# Patient Record
Sex: MALE | Race: BLACK OR AFRICAN AMERICAN | Employment: OTHER | URBAN - METROPOLITAN AREA
[De-identification: names, ages, dates, MRNs, and addresses within clinical notes are randomized per-mention and may not be internally consistent; named-entity substitution may affect disease eponyms.]

---

## 2020-01-01 ENCOUNTER — APPOINTMENT (OUTPATIENT)
Dept: GENERAL RADIOLOGY | Age: 65
DRG: 207 | End: 2020-01-01
Payer: MEDICARE

## 2020-01-01 ENCOUNTER — APPOINTMENT (OUTPATIENT)
Dept: CT IMAGING | Age: 65
DRG: 207 | End: 2020-01-01
Payer: MEDICARE

## 2020-01-01 ENCOUNTER — HOSPITAL ENCOUNTER (INPATIENT)
Age: 65
LOS: 19 days | DRG: 207 | End: 2020-11-30
Attending: EMERGENCY MEDICINE | Admitting: INTERNAL MEDICINE
Payer: MEDICARE

## 2020-01-01 VITALS
HEIGHT: 68 IN | SYSTOLIC BLOOD PRESSURE: 114 MMHG | BODY MASS INDEX: 36.32 KG/M2 | OXYGEN SATURATION: 91 % | WEIGHT: 239.64 LBS | TEMPERATURE: 99.2 F | HEART RATE: 114 BPM | DIASTOLIC BLOOD PRESSURE: 60 MMHG | RESPIRATION RATE: 33 BRPM

## 2020-01-01 LAB
AADO2: 218.4 MMHG
AADO2: 236.1 MMHG
AADO2: 256.7 MMHG
AADO2: 281 MMHG
AADO2: 285.6 MMHG
AADO2: 291.2 MMHG
AADO2: 298.8 MMHG
AADO2: 318.8 MMHG
AADO2: 337.6 MMHG
AADO2: 342.5 MMHG
AADO2: 346.3 MMHG
AADO2: 392.8 MMHG
AADO2: 411.1 MMHG
AADO2: 454.1 MMHG
AADO2: 464.3 MMHG
AADO2: 476.4 MMHG
AADO2: 477.3 MMHG
AADO2: 477.5 MMHG
AADO2: 494.5 MMHG
AADO2: 516.2 MMHG
AADO2: 538.2 MMHG
AADO2: 538.7 MMHG
AADO2: 568.4 MMHG
AADO2: 575.6 MMHG
ALBUMIN SERPL-MCNC: 2.3 G/DL (ref 3.5–5.2)
ALBUMIN SERPL-MCNC: 2.4 G/DL (ref 3.5–5.2)
ALBUMIN SERPL-MCNC: 2.4 G/DL (ref 3.5–5.2)
ALBUMIN SERPL-MCNC: 2.5 G/DL (ref 3.5–5.2)
ALBUMIN SERPL-MCNC: 2.6 G/DL (ref 3.5–5.2)
ALBUMIN SERPL-MCNC: 2.7 G/DL (ref 3.5–5.2)
ALBUMIN SERPL-MCNC: 2.7 G/DL (ref 3.5–5.2)
ALBUMIN SERPL-MCNC: 2.8 G/DL (ref 3.5–5.2)
ALBUMIN SERPL-MCNC: 2.9 G/DL (ref 3.5–5.2)
ALBUMIN SERPL-MCNC: 3 G/DL (ref 3.5–5.2)
ALBUMIN SERPL-MCNC: 3 G/DL (ref 3.5–5.2)
ALBUMIN SERPL-MCNC: 3.1 G/DL (ref 3.5–5.2)
ALBUMIN SERPL-MCNC: 3.1 G/DL (ref 3.5–5.2)
ALBUMIN SERPL-MCNC: 3.2 G/DL (ref 3.5–5.2)
ALBUMIN SERPL-MCNC: 3.4 G/DL (ref 3.5–5.2)
ALP BLD-CCNC: 100 U/L (ref 40–129)
ALP BLD-CCNC: 108 U/L (ref 40–129)
ALP BLD-CCNC: 120 U/L (ref 40–129)
ALP BLD-CCNC: 122 U/L (ref 40–129)
ALP BLD-CCNC: 123 U/L (ref 40–129)
ALP BLD-CCNC: 128 U/L (ref 40–129)
ALP BLD-CCNC: 132 U/L (ref 40–129)
ALP BLD-CCNC: 142 U/L (ref 40–129)
ALP BLD-CCNC: 64 U/L (ref 40–129)
ALP BLD-CCNC: 71 U/L (ref 40–129)
ALP BLD-CCNC: 72 U/L (ref 40–129)
ALP BLD-CCNC: 76 U/L (ref 40–129)
ALP BLD-CCNC: 78 U/L (ref 40–129)
ALP BLD-CCNC: 81 U/L (ref 40–129)
ALP BLD-CCNC: 82 U/L (ref 40–129)
ALP BLD-CCNC: 85 U/L (ref 40–129)
ALP BLD-CCNC: 91 U/L (ref 40–129)
ALP BLD-CCNC: 94 U/L (ref 40–129)
ALP BLD-CCNC: 95 U/L (ref 40–129)
ALP BLD-CCNC: 96 U/L (ref 40–129)
ALT SERPL-CCNC: 114 U/L (ref 0–40)
ALT SERPL-CCNC: 131 U/L (ref 0–40)
ALT SERPL-CCNC: 36 U/L (ref 0–40)
ALT SERPL-CCNC: 37 U/L (ref 0–40)
ALT SERPL-CCNC: 39 U/L (ref 0–40)
ALT SERPL-CCNC: 40 U/L (ref 0–40)
ALT SERPL-CCNC: 42 U/L (ref 0–40)
ALT SERPL-CCNC: 43 U/L (ref 0–40)
ALT SERPL-CCNC: 44 U/L (ref 0–40)
ALT SERPL-CCNC: 48 U/L (ref 0–40)
ALT SERPL-CCNC: 48 U/L (ref 0–40)
ALT SERPL-CCNC: 49 U/L (ref 0–40)
ALT SERPL-CCNC: 53 U/L (ref 0–40)
ALT SERPL-CCNC: 72 U/L (ref 0–40)
ALT SERPL-CCNC: 79 U/L (ref 0–40)
ALT SERPL-CCNC: 93 U/L (ref 0–40)
ANION GAP SERPL CALCULATED.3IONS-SCNC: 10 MMOL/L (ref 7–16)
ANION GAP SERPL CALCULATED.3IONS-SCNC: 12 MMOL/L (ref 7–16)
ANION GAP SERPL CALCULATED.3IONS-SCNC: 12 MMOL/L (ref 7–16)
ANION GAP SERPL CALCULATED.3IONS-SCNC: 13 MMOL/L (ref 7–16)
ANION GAP SERPL CALCULATED.3IONS-SCNC: 15 MMOL/L (ref 7–16)
ANION GAP SERPL CALCULATED.3IONS-SCNC: 15 MMOL/L (ref 7–16)
ANION GAP SERPL CALCULATED.3IONS-SCNC: 2 MMOL/L (ref 7–16)
ANION GAP SERPL CALCULATED.3IONS-SCNC: 4 MMOL/L (ref 7–16)
ANION GAP SERPL CALCULATED.3IONS-SCNC: 4 MMOL/L (ref 7–16)
ANION GAP SERPL CALCULATED.3IONS-SCNC: 5 MMOL/L (ref 7–16)
ANION GAP SERPL CALCULATED.3IONS-SCNC: 6 MMOL/L (ref 7–16)
ANION GAP SERPL CALCULATED.3IONS-SCNC: 7 MMOL/L (ref 7–16)
ANION GAP SERPL CALCULATED.3IONS-SCNC: 8 MMOL/L (ref 7–16)
ANION GAP SERPL CALCULATED.3IONS-SCNC: 9 MMOL/L (ref 7–16)
ANION GAP SERPL CALCULATED.3IONS-SCNC: 9 MMOL/L (ref 7–16)
ANISOCYTOSIS: ABNORMAL
APTT: 103.3 SEC (ref 24.5–35.1)
APTT: 173.1 SEC (ref 24.5–35.1)
APTT: 34.3 SEC (ref 24.5–35.1)
APTT: 41.8 SEC (ref 24.5–35.1)
APTT: 43.6 SEC (ref 24.5–35.1)
APTT: 47 SEC (ref 24.5–35.1)
APTT: 50.7 SEC (ref 24.5–35.1)
APTT: 51.8 SEC (ref 24.5–35.1)
APTT: 65.2 SEC (ref 24.5–35.1)
AST SERPL-CCNC: 139 U/L (ref 0–39)
AST SERPL-CCNC: 201 U/L (ref 0–39)
AST SERPL-CCNC: 21 U/L (ref 0–39)
AST SERPL-CCNC: 25 U/L (ref 0–39)
AST SERPL-CCNC: 28 U/L (ref 0–39)
AST SERPL-CCNC: 29 U/L (ref 0–39)
AST SERPL-CCNC: 31 U/L (ref 0–39)
AST SERPL-CCNC: 32 U/L (ref 0–39)
AST SERPL-CCNC: 33 U/L (ref 0–39)
AST SERPL-CCNC: 39 U/L (ref 0–39)
AST SERPL-CCNC: 39 U/L (ref 0–39)
AST SERPL-CCNC: 42 U/L (ref 0–39)
AST SERPL-CCNC: 43 U/L (ref 0–39)
AST SERPL-CCNC: 43 U/L (ref 0–39)
AST SERPL-CCNC: 46 U/L (ref 0–39)
AST SERPL-CCNC: 48 U/L (ref 0–39)
AST SERPL-CCNC: 58 U/L (ref 0–39)
AST SERPL-CCNC: 85 U/L (ref 0–39)
ATYPICAL LYMPHOCYTE RELATIVE PERCENT: 0.9 % (ref 0–4)
ATYPICAL LYMPHOCYTE RELATIVE PERCENT: 0.9 % (ref 0–4)
ATYPICAL LYMPHOCYTE RELATIVE PERCENT: 1 % (ref 0–4)
ATYPICAL LYMPHOCYTE RELATIVE PERCENT: 1 % (ref 0–4)
ATYPICAL LYMPHOCYTE RELATIVE PERCENT: 2 % (ref 0–4)
ATYPICAL LYMPHOCYTE RELATIVE PERCENT: 2 % (ref 0–4)
ATYPICAL LYMPHOCYTE RELATIVE PERCENT: 7 % (ref 0–4)
B.E.: -0.2 MMOL/L (ref -3–3)
B.E.: -1.3 MMOL/L (ref -3–3)
B.E.: -1.3 MMOL/L (ref -3–3)
B.E.: -1.8 MMOL/L
B.E.: -1.9 MMOL/L (ref -3–3)
B.E.: 0.4 MMOL/L (ref -3–3)
B.E.: 0.5 MMOL/L (ref -3–3)
B.E.: 1.5 MMOL/L (ref -3–3)
B.E.: 1.6 MMOL/L (ref -3–3)
B.E.: 1.9 MMOL/L (ref -3–3)
B.E.: 10.1 MMOL/L (ref -3–3)
B.E.: 10.5 MMOL/L (ref -3–3)
B.E.: 13.9 MMOL/L (ref -3–3)
B.E.: 2.3 MMOL/L (ref -3–3)
B.E.: 20.9 MMOL/L (ref -3–3)
B.E.: 3 MMOL/L (ref -3–0)
B.E.: 3.1 MMOL/L (ref -3–3)
B.E.: 4 MMOL/L (ref -3–3)
B.E.: 4.2 MMOL/L (ref -3–3)
B.E.: 4.2 MMOL/L (ref -3–3)
B.E.: 5 MMOL/L (ref -3–3)
B.E.: 5.8 MMOL/L (ref -3–3)
B.E.: 7.3 MMOL/L (ref -3–3)
B.E.: 7.8 MMOL/L (ref -3–3)
BASOPHILIC STIPPLING: ABNORMAL
BASOPHILS ABSOLUTE: 0 E9/L (ref 0–0.2)
BASOPHILS ABSOLUTE: 0.04 E9/L (ref 0–0.2)
BASOPHILS ABSOLUTE: 0.04 E9/L (ref 0–0.2)
BASOPHILS ABSOLUTE: 0.05 E9/L (ref 0–0.2)
BASOPHILS ABSOLUTE: 0.06 E9/L (ref 0–0.2)
BASOPHILS ABSOLUTE: 0.07 E9/L (ref 0–0.2)
BASOPHILS ABSOLUTE: 0.21 E9/L (ref 0–0.2)
BASOPHILS RELATIVE PERCENT: 0 % (ref 0–2)
BASOPHILS RELATIVE PERCENT: 0.3 % (ref 0–2)
BASOPHILS RELATIVE PERCENT: 0.5 % (ref 0–2)
BASOPHILS RELATIVE PERCENT: 0.7 % (ref 0–2)
BASOPHILS RELATIVE PERCENT: 0.9 % (ref 0–2)
BILIRUB SERPL-MCNC: 0.3 MG/DL (ref 0–1.2)
BILIRUB SERPL-MCNC: 0.4 MG/DL (ref 0–1.2)
BILIRUB SERPL-MCNC: 0.5 MG/DL (ref 0–1.2)
BILIRUB SERPL-MCNC: 0.6 MG/DL (ref 0–1.2)
BILIRUB SERPL-MCNC: 0.7 MG/DL (ref 0–1.2)
BILIRUB SERPL-MCNC: 1.1 MG/DL (ref 0–1.2)
BILIRUB SERPL-MCNC: 1.1 MG/DL (ref 0–1.2)
BILIRUB SERPL-MCNC: 1.7 MG/DL (ref 0–1.2)
BILIRUBIN URINE: NEGATIVE
BLOOD CULTURE, ROUTINE: NORMAL
BLOOD, URINE: NEGATIVE
BUN BLDV-MCNC: 16 MG/DL (ref 8–23)
BUN BLDV-MCNC: 17 MG/DL (ref 8–23)
BUN BLDV-MCNC: 17 MG/DL (ref 8–23)
BUN BLDV-MCNC: 18 MG/DL (ref 8–23)
BUN BLDV-MCNC: 19 MG/DL (ref 8–23)
BUN BLDV-MCNC: 19 MG/DL (ref 8–23)
BUN BLDV-MCNC: 20 MG/DL (ref 8–23)
BUN BLDV-MCNC: 20 MG/DL (ref 8–23)
BUN BLDV-MCNC: 21 MG/DL (ref 8–23)
BUN BLDV-MCNC: 23 MG/DL (ref 8–23)
BUN BLDV-MCNC: 28 MG/DL (ref 8–23)
BUN BLDV-MCNC: 31 MG/DL (ref 8–23)
BUN BLDV-MCNC: 32 MG/DL (ref 8–23)
BUN BLDV-MCNC: 32 MG/DL (ref 8–23)
BUN BLDV-MCNC: 33 MG/DL (ref 8–23)
BUN BLDV-MCNC: 35 MG/DL (ref 8–23)
BUN BLDV-MCNC: 35 MG/DL (ref 8–23)
BUN BLDV-MCNC: 37 MG/DL (ref 8–23)
BUN BLDV-MCNC: 38 MG/DL (ref 8–23)
C-REACTIVE PROTEIN: 0.4 MG/DL (ref 0–0.4)
C-REACTIVE PROTEIN: 0.5 MG/DL (ref 0–0.4)
C-REACTIVE PROTEIN: 0.8 MG/DL (ref 0–0.4)
C-REACTIVE PROTEIN: 1.4 MG/DL (ref 0–0.4)
C-REACTIVE PROTEIN: 12.4 MG/DL (ref 0–0.4)
C-REACTIVE PROTEIN: 2.1 MG/DL (ref 0–0.4)
C-REACTIVE PROTEIN: 23 MG/DL (ref 0–0.4)
C-REACTIVE PROTEIN: 27.8 MG/DL (ref 0–0.4)
C-REACTIVE PROTEIN: 5.5 MG/DL (ref 0–0.4)
C-REACTIVE PROTEIN: 8.6 MG/DL (ref 0–0.4)
CALCIUM SERPL-MCNC: 10.4 MG/DL (ref 8.6–10.2)
CALCIUM SERPL-MCNC: 7.7 MG/DL (ref 8.6–10.2)
CALCIUM SERPL-MCNC: 8 MG/DL (ref 8.6–10.2)
CALCIUM SERPL-MCNC: 8.1 MG/DL (ref 8.6–10.2)
CALCIUM SERPL-MCNC: 8.2 MG/DL (ref 8.6–10.2)
CALCIUM SERPL-MCNC: 8.3 MG/DL (ref 8.6–10.2)
CALCIUM SERPL-MCNC: 8.3 MG/DL (ref 8.6–10.2)
CALCIUM SERPL-MCNC: 8.4 MG/DL (ref 8.6–10.2)
CALCIUM SERPL-MCNC: 8.6 MG/DL (ref 8.6–10.2)
CALCIUM SERPL-MCNC: 8.7 MG/DL (ref 8.6–10.2)
CALCIUM SERPL-MCNC: 8.8 MG/DL (ref 8.6–10.2)
CALCIUM SERPL-MCNC: 8.9 MG/DL (ref 8.6–10.2)
CALCIUM SERPL-MCNC: 9 MG/DL (ref 8.6–10.2)
CALCIUM SERPL-MCNC: 9.9 MG/DL (ref 8.6–10.2)
CHLORIDE BLD-SCNC: 100 MMOL/L (ref 98–107)
CHLORIDE BLD-SCNC: 101 MMOL/L (ref 98–107)
CHLORIDE BLD-SCNC: 101 MMOL/L (ref 98–107)
CHLORIDE BLD-SCNC: 102 MMOL/L (ref 98–107)
CHLORIDE BLD-SCNC: 103 MMOL/L (ref 98–107)
CHLORIDE BLD-SCNC: 105 MMOL/L (ref 98–107)
CHLORIDE BLD-SCNC: 109 MMOL/L (ref 98–107)
CHLORIDE BLD-SCNC: 110 MMOL/L (ref 98–107)
CHLORIDE BLD-SCNC: 110 MMOL/L (ref 98–107)
CHLORIDE BLD-SCNC: 112 MMOL/L (ref 98–107)
CHLORIDE BLD-SCNC: 112 MMOL/L (ref 98–107)
CHLORIDE BLD-SCNC: 122 MMOL/L (ref 98–107)
CHLORIDE BLD-SCNC: 99 MMOL/L (ref 98–107)
CHLORIDE URINE RANDOM: 41 MMOL/L
CHLORIDE URINE RANDOM: <20 MMOL/L
CHLORIDE URINE RANDOM: <20 MMOL/L
CLARITY: CLEAR
CO2: 22 MMOL/L (ref 22–29)
CO2: 24 MMOL/L (ref 22–29)
CO2: 24 MMOL/L (ref 22–29)
CO2: 25 MMOL/L (ref 22–29)
CO2: 27 MMOL/L (ref 22–29)
CO2: 28 MMOL/L (ref 22–29)
CO2: 28 MMOL/L (ref 22–29)
CO2: 29 MMOL/L (ref 22–29)
CO2: 29 MMOL/L (ref 22–29)
CO2: 30 MMOL/L (ref 22–29)
CO2: 31 MMOL/L (ref 22–29)
CO2: 31 MMOL/L (ref 22–29)
CO2: 32 MMOL/L (ref 22–29)
CO2: 32 MMOL/L (ref 22–29)
CO2: 33 MMOL/L (ref 22–29)
CO2: 35 MMOL/L (ref 22–29)
CO2: 36 MMOL/L (ref 22–29)
CO2: 39 MMOL/L (ref 22–29)
CO2: 42 MMOL/L (ref 22–29)
CO2: 43 MMOL/L (ref 22–29)
CO2: 45 MMOL/L (ref 22–29)
COHB: 0.3 % (ref 0–1.5)
COHB: 0.3 % (ref 0–1.5)
COHB: 0.4 % (ref 0–1.5)
COHB: 0.4 % (ref 0–1.5)
COHB: 0.5 % (ref 0–1.5)
COHB: 0.5 % (ref 0–1.5)
COHB: 0.6 % (ref 0–1.5)
COHB: 0.7 % (ref 0–1.5)
COHB: 0.8 % (ref 0–1.5)
COHB: 0.9 % (ref 0–1.5)
COHB: 1.2 % (ref 0–1.5)
COLOR: YELLOW
CORTISOL TOTAL: 2.37 MCG/DL (ref 2.68–18.4)
CREAT SERPL-MCNC: 0.7 MG/DL (ref 0.7–1.2)
CREAT SERPL-MCNC: 0.8 MG/DL (ref 0.7–1.2)
CREAT SERPL-MCNC: 0.9 MG/DL (ref 0.7–1.2)
CREAT SERPL-MCNC: 1 MG/DL (ref 0.7–1.2)
CREAT SERPL-MCNC: 1.1 MG/DL (ref 0.7–1.2)
CREAT SERPL-MCNC: 1.2 MG/DL (ref 0.7–1.2)
CREAT SERPL-MCNC: 1.2 MG/DL (ref 0.7–1.2)
CREAT SERPL-MCNC: 1.6 MG/DL (ref 0.7–1.2)
CREAT SERPL-MCNC: 1.9 MG/DL (ref 0.7–1.2)
CREAT SERPL-MCNC: 2.4 MG/DL (ref 0.7–1.2)
CREATININE URINE: 121 MG/DL (ref 40–278)
CRITICAL NOTIFICATION: YES
CRITICAL: ABNORMAL
CULTURE CATHETER TIP: ABNORMAL
CULTURE, BLOOD 2: ABNORMAL
CULTURE, BLOOD 2: NORMAL
CULTURE, RESPIRATORY: ABNORMAL
CULTURE, RESPIRATORY: ABNORMAL
CULTURE, RESPIRATORY: NORMAL
CULTURE, RESPIRATORY: NORMAL
D DIMER: 1476 NG/ML DDU
D DIMER: 2425 NG/ML DDU
D DIMER: 252 NG/ML DDU
D DIMER: 256 NG/ML DDU
D DIMER: 3674 NG/ML DDU
D DIMER: 581 NG/ML DDU
D DIMER: 601 NG/ML DDU
D DIMER: 834 NG/ML DDU
D DIMER: 972 NG/ML DDU
D DIMER: >5250 NG/ML DDU
DATE ANALYZED: ABNORMAL
DATE OF COLLECTION: ABNORMAL
DELIVERY SYSTEMS: ABNORMAL
DEVICE: ABNORMAL
EKG ATRIAL RATE: 119 BPM
EKG ATRIAL RATE: 87 BPM
EKG P AXIS: 66 DEGREES
EKG P AXIS: 67 DEGREES
EKG P-R INTERVAL: 114 MS
EKG P-R INTERVAL: 138 MS
EKG Q-T INTERVAL: 370 MS
EKG Q-T INTERVAL: 408 MS
EKG QRS DURATION: 120 MS
EKG QRS DURATION: 124 MS
EKG QTC CALCULATION (BAZETT): 490 MS
EKG QTC CALCULATION (BAZETT): 520 MS
EKG R AXIS: -48 DEGREES
EKG R AXIS: -67 DEGREES
EKG T AXIS: 27 DEGREES
EKG T AXIS: 6 DEGREES
EKG VENTRICULAR RATE: 119 BPM
EKG VENTRICULAR RATE: 87 BPM
EOSINOPHILS ABSOLUTE: 0 E9/L (ref 0.05–0.5)
EOSINOPHILS ABSOLUTE: 0.01 E9/L (ref 0.05–0.5)
EOSINOPHILS ABSOLUTE: 0.02 E9/L (ref 0.05–0.5)
EOSINOPHILS ABSOLUTE: 0.08 E9/L (ref 0.05–0.5)
EOSINOPHILS ABSOLUTE: 0.09 E9/L (ref 0.05–0.5)
EOSINOPHILS ABSOLUTE: 0.13 E9/L (ref 0.05–0.5)
EOSINOPHILS ABSOLUTE: 0.17 E9/L (ref 0.05–0.5)
EOSINOPHILS RELATIVE PERCENT: 0 % (ref 0–6)
EOSINOPHILS RELATIVE PERCENT: 0.1 % (ref 0–6)
EOSINOPHILS RELATIVE PERCENT: 0.2 % (ref 0–6)
EOSINOPHILS RELATIVE PERCENT: 0.8 % (ref 0–6)
EOSINOPHILS RELATIVE PERCENT: 0.9 % (ref 0–6)
EOSINOPHILS RELATIVE PERCENT: 1 % (ref 0–6)
EOSINOPHILS RELATIVE PERCENT: 1.2 % (ref 0–6)
FERRITIN: 1710 NG/ML
FIBRINOGEN: 442 MG/DL (ref 225–540)
FIBRINOGEN: 475 MG/DL (ref 225–540)
FIBRINOGEN: 528 MG/DL (ref 225–540)
FIBRINOGEN: 560 MG/DL (ref 225–540)
FIBRINOGEN: 625 MG/DL (ref 225–540)
FIBRINOGEN: >700 MG/DL (ref 225–540)
FIO2 ARTERIAL: 80
FIO2: 100 %
FIO2: 50 %
FIO2: 50 %
FIO2: 55 %
FIO2: 60 %
FIO2: 70 %
FIO2: 70 %
FIO2: 75 %
FIO2: 80 %
FIO2: 80 %
FIO2: 90 %
GFR AFRICAN AMERICAN: 33
GFR AFRICAN AMERICAN: 43
GFR AFRICAN AMERICAN: 52
GFR AFRICAN AMERICAN: >60
GFR NON-AFRICAN AMERICAN: 33 ML/MIN/1.73
GFR NON-AFRICAN AMERICAN: 43 ML/MIN/1.73
GFR NON-AFRICAN AMERICAN: 52 ML/MIN/1.73
GFR NON-AFRICAN AMERICAN: >60 ML/MIN/1.73
GLUCOSE BLD-MCNC: 123 MG/DL (ref 74–99)
GLUCOSE BLD-MCNC: 127 MG/DL (ref 74–99)
GLUCOSE BLD-MCNC: 142 MG/DL (ref 74–99)
GLUCOSE BLD-MCNC: 156 MG/DL (ref 74–99)
GLUCOSE BLD-MCNC: 157 MG/DL (ref 74–99)
GLUCOSE BLD-MCNC: 160 MG/DL (ref 74–99)
GLUCOSE BLD-MCNC: 172 MG/DL (ref 74–99)
GLUCOSE BLD-MCNC: 176 MG/DL (ref 74–99)
GLUCOSE BLD-MCNC: 177 MG/DL (ref 74–99)
GLUCOSE BLD-MCNC: 178 MG/DL (ref 74–99)
GLUCOSE BLD-MCNC: 180 MG/DL (ref 74–99)
GLUCOSE BLD-MCNC: 181 MG/DL (ref 74–99)
GLUCOSE BLD-MCNC: 184 MG/DL (ref 74–99)
GLUCOSE BLD-MCNC: 187 MG/DL (ref 74–99)
GLUCOSE BLD-MCNC: 195 MG/DL (ref 74–99)
GLUCOSE BLD-MCNC: 196 MG/DL (ref 74–99)
GLUCOSE BLD-MCNC: 199 MG/DL (ref 74–99)
GLUCOSE BLD-MCNC: 201 MG/DL (ref 74–99)
GLUCOSE BLD-MCNC: 213 MG/DL (ref 74–99)
GLUCOSE BLD-MCNC: 217 MG/DL (ref 74–99)
GLUCOSE BLD-MCNC: 226 MG/DL (ref 74–99)
GLUCOSE URINE: NEGATIVE MG/DL
GRAM STAIN ORDERABLE: NORMAL
HCO3 ARTERIAL: 32.3 MMOL/L (ref 22–26)
HCO3: 22.4 MMOL/L
HCO3: 23.6 MMOL/L (ref 22–26)
HCO3: 24.5 MMOL/L (ref 22–26)
HCO3: 25.3 MMOL/L (ref 22–26)
HCO3: 25.5 MMOL/L (ref 22–26)
HCO3: 25.5 MMOL/L (ref 22–26)
HCO3: 26.7 MMOL/L (ref 22–26)
HCO3: 26.9 MMOL/L (ref 22–26)
HCO3: 27.5 MMOL/L (ref 22–26)
HCO3: 27.8 MMOL/L (ref 22–26)
HCO3: 28 MMOL/L (ref 22–26)
HCO3: 28.1 MMOL/L (ref 22–26)
HCO3: 28.7 MMOL/L (ref 22–26)
HCO3: 29 MMOL/L (ref 22–26)
HCO3: 29.1 MMOL/L (ref 22–26)
HCO3: 29.2 MMOL/L (ref 22–26)
HCO3: 29.9 MMOL/L (ref 22–26)
HCO3: 32 MMOL/L (ref 22–26)
HCO3: 32.3 MMOL/L (ref 22–26)
HCO3: 35.8 MMOL/L (ref 22–26)
HCO3: 36.2 MMOL/L (ref 22–26)
HCO3: 37.2 MMOL/L (ref 22–26)
HCO3: 39.5 MMOL/L (ref 22–26)
HCO3: 41.4 MMOL/L (ref 22–26)
HCO3: 50.9 MMOL/L (ref 22–26)
HCT VFR BLD CALC: 29.7 % (ref 37–54)
HCT VFR BLD CALC: 32.3 % (ref 37–54)
HCT VFR BLD CALC: 33.4 % (ref 37–54)
HCT VFR BLD CALC: 33.8 % (ref 37–54)
HCT VFR BLD CALC: 34 % (ref 37–54)
HCT VFR BLD CALC: 37.1 % (ref 37–54)
HCT VFR BLD CALC: 37.8 % (ref 37–54)
HCT VFR BLD CALC: 39.4 % (ref 37–54)
HCT VFR BLD CALC: 39.7 % (ref 37–54)
HCT VFR BLD CALC: 40.1 % (ref 37–54)
HCT VFR BLD CALC: 41 % (ref 37–54)
HCT VFR BLD CALC: 41.2 % (ref 37–54)
HCT VFR BLD CALC: 41.7 % (ref 37–54)
HCT VFR BLD CALC: 42.2 % (ref 37–54)
HCT VFR BLD CALC: 42.5 % (ref 37–54)
HCT VFR BLD CALC: 43.1 % (ref 37–54)
HCT VFR BLD CALC: 43.3 % (ref 37–54)
HCT VFR BLD CALC: 44.1 % (ref 37–54)
HCT VFR BLD CALC: 44.5 % (ref 37–54)
HCT VFR BLD CALC: 45.2 % (ref 37–54)
HCT VFR BLD CALC: 47.3 % (ref 37–54)
HEMOGLOBIN: 10 G/DL (ref 12.5–16.5)
HEMOGLOBIN: 10.4 G/DL (ref 12.5–16.5)
HEMOGLOBIN: 10.4 G/DL (ref 12.5–16.5)
HEMOGLOBIN: 11.7 G/DL (ref 12.5–16.5)
HEMOGLOBIN: 12 G/DL (ref 12.5–16.5)
HEMOGLOBIN: 12.1 G/DL (ref 12.5–16.5)
HEMOGLOBIN: 12.4 G/DL (ref 12.5–16.5)
HEMOGLOBIN: 13 G/DL (ref 12.5–16.5)
HEMOGLOBIN: 13 G/DL (ref 12.5–16.5)
HEMOGLOBIN: 13.4 G/DL (ref 12.5–16.5)
HEMOGLOBIN: 13.4 G/DL (ref 12.5–16.5)
HEMOGLOBIN: 13.5 G/DL (ref 12.5–16.5)
HEMOGLOBIN: 13.6 G/DL (ref 12.5–16.5)
HEMOGLOBIN: 13.7 G/DL (ref 12.5–16.5)
HEMOGLOBIN: 14.1 G/DL (ref 12.5–16.5)
HEMOGLOBIN: 14.2 G/DL (ref 12.5–16.5)
HEMOGLOBIN: 14.6 G/DL (ref 12.5–16.5)
HEMOGLOBIN: 14.9 G/DL (ref 12.5–16.5)
HEMOGLOBIN: 15.3 G/DL (ref 12.5–16.5)
HEMOGLOBIN: 9.2 G/DL (ref 12.5–16.5)
HEMOGLOBIN: 9.9 G/DL (ref 12.5–16.5)
HHB: 10.6 % (ref 0–5)
HHB: 13 % (ref 0–5)
HHB: 13.4 % (ref 0–5)
HHB: 2.2 % (ref 0–5)
HHB: 20.7 %
HHB: 3.7 % (ref 0–5)
HHB: 3.9 % (ref 0–5)
HHB: 4.1 % (ref 0–5)
HHB: 4.5 % (ref 0–5)
HHB: 4.6 % (ref 0–5)
HHB: 4.7 % (ref 0–5)
HHB: 4.8 % (ref 0–5)
HHB: 5.5 % (ref 0–5)
HHB: 5.7 % (ref 0–5)
HHB: 6 % (ref 0–5)
HHB: 6.5 % (ref 0–5)
HHB: 6.7 % (ref 0–5)
HHB: 6.8 % (ref 0–5)
HHB: 7.3 % (ref 0–5)
HHB: 7.9 % (ref 0–5)
HHB: 8.3 % (ref 0–5)
HHB: 8.7 % (ref 0–5)
HHB: 9.5 % (ref 0–5)
HHB: 9.7 % (ref 0–5)
HHB: 9.7 % (ref 0–5)
IMMATURE GRANULOCYTES #: 0.3 E9/L
IMMATURE GRANULOCYTES #: 0.34 E9/L
IMMATURE GRANULOCYTES #: 0.35 E9/L
IMMATURE GRANULOCYTES #: 0.37 E9/L
IMMATURE GRANULOCYTES #: 0.45 E9/L
IMMATURE GRANULOCYTES #: 0.59 E9/L
IMMATURE GRANULOCYTES #: 0.69 E9/L
IMMATURE GRANULOCYTES %: 3.3 % (ref 0–5)
IMMATURE GRANULOCYTES %: 3.6 % (ref 0–5)
IMMATURE GRANULOCYTES %: 3.6 % (ref 0–5)
IMMATURE GRANULOCYTES %: 4.1 % (ref 0–5)
IMMATURE GRANULOCYTES %: 4.2 % (ref 0–5)
IMMATURE GRANULOCYTES %: 4.4 % (ref 0–5)
IMMATURE GRANULOCYTES %: 6.3 % (ref 0–5)
KETONES, URINE: NEGATIVE MG/DL
L. PNEUMOPHILA SEROGP 1 UR AG: NORMAL
LAB: ABNORMAL
LACTATE DEHYDROGENASE: 1112 U/L (ref 135–225)
LACTIC ACID, SEPSIS: 2.8 MMOL/L (ref 0.5–1.9)
LACTIC ACID: 1.2 MMOL/L (ref 0.5–2.2)
LACTIC ACID: 1.3 MMOL/L (ref 0.5–2.2)
LACTIC ACID: 1.4 MMOL/L (ref 0.5–2.2)
LACTIC ACID: 1.8 MMOL/L (ref 0.5–2.2)
LACTIC ACID: 1.9 MMOL/L (ref 0.5–2.2)
LACTIC ACID: 2.1 MMOL/L (ref 0.5–2.2)
LACTIC ACID: 2.1 MMOL/L (ref 0.5–2.2)
LACTIC ACID: 2.3 MMOL/L (ref 0.5–2.2)
LACTIC ACID: 2.9 MMOL/L (ref 0.5–2.2)
LEUKOCYTE ESTERASE, URINE: NEGATIVE
LIPASE: 65 U/L (ref 13–60)
LYMPHOCYTES ABSOLUTE: 0.38 E9/L (ref 1.5–4)
LYMPHOCYTES ABSOLUTE: 0.4 E9/L (ref 1.5–4)
LYMPHOCYTES ABSOLUTE: 0.44 E9/L (ref 1.5–4)
LYMPHOCYTES ABSOLUTE: 0.66 E9/L (ref 1.5–4)
LYMPHOCYTES ABSOLUTE: 0.73 E9/L (ref 1.5–4)
LYMPHOCYTES ABSOLUTE: 0.76 E9/L (ref 1.5–4)
LYMPHOCYTES ABSOLUTE: 0.79 E9/L (ref 1.5–4)
LYMPHOCYTES ABSOLUTE: 0.83 E9/L (ref 1.5–4)
LYMPHOCYTES ABSOLUTE: 0.86 E9/L (ref 1.5–4)
LYMPHOCYTES ABSOLUTE: 0.88 E9/L (ref 1.5–4)
LYMPHOCYTES ABSOLUTE: 1.01 E9/L (ref 1.5–4)
LYMPHOCYTES ABSOLUTE: 1.07 E9/L (ref 1.5–4)
LYMPHOCYTES ABSOLUTE: 1.24 E9/L (ref 1.5–4)
LYMPHOCYTES ABSOLUTE: 1.26 E9/L (ref 1.5–4)
LYMPHOCYTES ABSOLUTE: 1.36 E9/L (ref 1.5–4)
LYMPHOCYTES ABSOLUTE: 1.41 E9/L (ref 1.5–4)
LYMPHOCYTES ABSOLUTE: 1.62 E9/L (ref 1.5–4)
LYMPHOCYTES ABSOLUTE: 1.89 E9/L (ref 1.5–4)
LYMPHOCYTES ABSOLUTE: 2.35 E9/L (ref 1.5–4)
LYMPHOCYTES ABSOLUTE: 3.94 E9/L (ref 1.5–4)
LYMPHOCYTES RELATIVE PERCENT: 1.7 % (ref 20–42)
LYMPHOCYTES RELATIVE PERCENT: 10 % (ref 20–42)
LYMPHOCYTES RELATIVE PERCENT: 10.4 % (ref 20–42)
LYMPHOCYTES RELATIVE PERCENT: 11 % (ref 20–42)
LYMPHOCYTES RELATIVE PERCENT: 12.1 % (ref 20–42)
LYMPHOCYTES RELATIVE PERCENT: 12.3 % (ref 20–42)
LYMPHOCYTES RELATIVE PERCENT: 14 % (ref 20–42)
LYMPHOCYTES RELATIVE PERCENT: 17 % (ref 20–42)
LYMPHOCYTES RELATIVE PERCENT: 2 % (ref 20–42)
LYMPHOCYTES RELATIVE PERCENT: 3 % (ref 20–42)
LYMPHOCYTES RELATIVE PERCENT: 4 % (ref 20–42)
LYMPHOCYTES RELATIVE PERCENT: 4.4 % (ref 20–42)
LYMPHOCYTES RELATIVE PERCENT: 5.7 % (ref 20–42)
LYMPHOCYTES RELATIVE PERCENT: 6.1 % (ref 20–42)
LYMPHOCYTES RELATIVE PERCENT: 7 % (ref 20–42)
LYMPHOCYTES RELATIVE PERCENT: 7 % (ref 20–42)
LYMPHOCYTES RELATIVE PERCENT: 7.4 % (ref 20–42)
LYMPHOCYTES RELATIVE PERCENT: 7.7 % (ref 20–42)
LYMPHOCYTES RELATIVE PERCENT: 9.2 % (ref 20–42)
LYMPHOCYTES RELATIVE PERCENT: 9.8 % (ref 20–42)
Lab: ABNORMAL
Lab: NORMAL
MAGNESIUM: 2 MG/DL (ref 1.6–2.6)
MAGNESIUM: 2.1 MG/DL (ref 1.6–2.6)
MAGNESIUM: 2.1 MG/DL (ref 1.6–2.6)
MAGNESIUM: 2.2 MG/DL (ref 1.6–2.6)
MAGNESIUM: 2.2 MG/DL (ref 1.6–2.6)
MAGNESIUM: 2.3 MG/DL (ref 1.6–2.6)
MAGNESIUM: 2.4 MG/DL (ref 1.6–2.6)
MAGNESIUM: 2.5 MG/DL (ref 1.6–2.6)
MAGNESIUM: 2.6 MG/DL (ref 1.6–2.6)
MAGNESIUM: 2.7 MG/DL (ref 1.6–2.6)
MAGNESIUM: 2.7 MG/DL (ref 1.6–2.6)
MCH RBC QN AUTO: 27.5 PG (ref 26–35)
MCH RBC QN AUTO: 27.8 PG (ref 26–35)
MCH RBC QN AUTO: 27.9 PG (ref 26–35)
MCH RBC QN AUTO: 28.1 PG (ref 26–35)
MCH RBC QN AUTO: 28.1 PG (ref 26–35)
MCH RBC QN AUTO: 28.2 PG (ref 26–35)
MCH RBC QN AUTO: 28.3 PG (ref 26–35)
MCH RBC QN AUTO: 28.4 PG (ref 26–35)
MCH RBC QN AUTO: 28.5 PG (ref 26–35)
MCH RBC QN AUTO: 28.5 PG (ref 26–35)
MCH RBC QN AUTO: 28.6 PG (ref 26–35)
MCH RBC QN AUTO: 28.6 PG (ref 26–35)
MCH RBC QN AUTO: 28.8 PG (ref 26–35)
MCH RBC QN AUTO: 29 PG (ref 26–35)
MCH RBC QN AUTO: 29.2 PG (ref 26–35)
MCHC RBC AUTO-ENTMCNC: 29.6 % (ref 32–34.5)
MCHC RBC AUTO-ENTMCNC: 30.5 % (ref 32–34.5)
MCHC RBC AUTO-ENTMCNC: 30.6 % (ref 32–34.5)
MCHC RBC AUTO-ENTMCNC: 30.9 % (ref 32–34.5)
MCHC RBC AUTO-ENTMCNC: 31 % (ref 32–34.5)
MCHC RBC AUTO-ENTMCNC: 31.1 % (ref 32–34.5)
MCHC RBC AUTO-ENTMCNC: 31.2 % (ref 32–34.5)
MCHC RBC AUTO-ENTMCNC: 31.5 % (ref 32–34.5)
MCHC RBC AUTO-ENTMCNC: 31.5 % (ref 32–34.5)
MCHC RBC AUTO-ENTMCNC: 31.6 % (ref 32–34.5)
MCHC RBC AUTO-ENTMCNC: 31.7 % (ref 32–34.5)
MCHC RBC AUTO-ENTMCNC: 31.8 % (ref 32–34.5)
MCHC RBC AUTO-ENTMCNC: 32.1 % (ref 32–34.5)
MCHC RBC AUTO-ENTMCNC: 32.7 % (ref 32–34.5)
MCHC RBC AUTO-ENTMCNC: 33 % (ref 32–34.5)
MCHC RBC AUTO-ENTMCNC: 33.1 % (ref 32–34.5)
MCHC RBC AUTO-ENTMCNC: 33.6 % (ref 32–34.5)
MCHC RBC AUTO-ENTMCNC: 34.4 % (ref 32–34.5)
MCV RBC AUTO: 83.6 FL (ref 80–99.9)
MCV RBC AUTO: 84.4 FL (ref 80–99.9)
MCV RBC AUTO: 86.3 FL (ref 80–99.9)
MCV RBC AUTO: 86.7 FL (ref 80–99.9)
MCV RBC AUTO: 86.8 FL (ref 80–99.9)
MCV RBC AUTO: 86.9 FL (ref 80–99.9)
MCV RBC AUTO: 87.4 FL (ref 80–99.9)
MCV RBC AUTO: 87.6 FL (ref 80–99.9)
MCV RBC AUTO: 89.1 FL (ref 80–99.9)
MCV RBC AUTO: 89.2 FL (ref 80–99.9)
MCV RBC AUTO: 89.8 FL (ref 80–99.9)
MCV RBC AUTO: 90 FL (ref 80–99.9)
MCV RBC AUTO: 90 FL (ref 80–99.9)
MCV RBC AUTO: 90.2 FL (ref 80–99.9)
MCV RBC AUTO: 91.8 FL (ref 80–99.9)
MCV RBC AUTO: 92.5 FL (ref 80–99.9)
MCV RBC AUTO: 92.8 FL (ref 80–99.9)
MCV RBC AUTO: 94.3 FL (ref 80–99.9)
MCV RBC AUTO: 94.7 FL (ref 80–99.9)
MCV RBC AUTO: 97.4 FL (ref 80–99.9)
METAMYELOCYTES RELATIVE PERCENT: 0.9 % (ref 0–1)
METAMYELOCYTES RELATIVE PERCENT: 0.9 % (ref 0–1)
METAMYELOCYTES RELATIVE PERCENT: 1 % (ref 0–1)
METAMYELOCYTES RELATIVE PERCENT: 1 % (ref 0–1)
METAMYELOCYTES RELATIVE PERCENT: 2 % (ref 0–1)
METAMYELOCYTES RELATIVE PERCENT: 2.6 % (ref 0–1)
METAMYELOCYTES RELATIVE PERCENT: 2.6 % (ref 0–1)
METAMYELOCYTES RELATIVE PERCENT: 2.7 % (ref 0–1)
METER GLUCOSE: 112 MG/DL (ref 74–99)
METER GLUCOSE: 130 MG/DL (ref 74–99)
METER GLUCOSE: 131 MG/DL (ref 74–99)
METER GLUCOSE: 131 MG/DL (ref 74–99)
METER GLUCOSE: 138 MG/DL (ref 74–99)
METER GLUCOSE: 142 MG/DL (ref 74–99)
METER GLUCOSE: 143 MG/DL (ref 74–99)
METER GLUCOSE: 145 MG/DL (ref 74–99)
METER GLUCOSE: 150 MG/DL (ref 74–99)
METER GLUCOSE: 154 MG/DL (ref 74–99)
METER GLUCOSE: 157 MG/DL (ref 74–99)
METER GLUCOSE: 160 MG/DL (ref 74–99)
METER GLUCOSE: 161 MG/DL (ref 74–99)
METER GLUCOSE: 165 MG/DL (ref 74–99)
METER GLUCOSE: 166 MG/DL (ref 74–99)
METER GLUCOSE: 172 MG/DL (ref 74–99)
METER GLUCOSE: 175 MG/DL (ref 74–99)
METER GLUCOSE: 178 MG/DL (ref 74–99)
METER GLUCOSE: 178 MG/DL (ref 74–99)
METER GLUCOSE: 180 MG/DL (ref 74–99)
METER GLUCOSE: 181 MG/DL (ref 74–99)
METER GLUCOSE: 183 MG/DL (ref 74–99)
METER GLUCOSE: 184 MG/DL (ref 74–99)
METER GLUCOSE: 186 MG/DL (ref 74–99)
METER GLUCOSE: 188 MG/DL (ref 74–99)
METER GLUCOSE: 189 MG/DL (ref 74–99)
METER GLUCOSE: 190 MG/DL (ref 74–99)
METER GLUCOSE: 196 MG/DL (ref 74–99)
METER GLUCOSE: 197 MG/DL (ref 74–99)
METER GLUCOSE: 197 MG/DL (ref 74–99)
METER GLUCOSE: 200 MG/DL (ref 74–99)
METER GLUCOSE: 201 MG/DL (ref 74–99)
METER GLUCOSE: 202 MG/DL (ref 74–99)
METER GLUCOSE: 203 MG/DL (ref 74–99)
METER GLUCOSE: 209 MG/DL (ref 74–99)
METER GLUCOSE: 218 MG/DL (ref 74–99)
METER GLUCOSE: 223 MG/DL (ref 74–99)
METER GLUCOSE: 224 MG/DL (ref 74–99)
METER GLUCOSE: 234 MG/DL (ref 74–99)
METER GLUCOSE: 235 MG/DL (ref 74–99)
METER GLUCOSE: 253 MG/DL (ref 74–99)
METER GLUCOSE: 262 MG/DL (ref 74–99)
METHB: 0 % (ref 0–1.5)
METHB: 0.1 % (ref 0–1.5)
METHB: 0.2 % (ref 0–1.5)
METHB: 0.3 % (ref 0–1.5)
METHB: 0.4 % (ref 0–1.5)
METHB: 0.5 % (ref 0–1.5)
METHB: 0.6 % (ref 0–1.5)
MODE: ABNORMAL
MODE: AC
MONOCYTES ABSOLUTE: 0.14 E9/L (ref 0.1–0.95)
MONOCYTES ABSOLUTE: 0.16 E9/L (ref 0.1–0.95)
MONOCYTES ABSOLUTE: 0.3 E9/L (ref 0.1–0.95)
MONOCYTES ABSOLUTE: 0.42 E9/L (ref 0.1–0.95)
MONOCYTES ABSOLUTE: 0.43 E9/L (ref 0.1–0.95)
MONOCYTES ABSOLUTE: 0.46 E9/L (ref 0.1–0.95)
MONOCYTES ABSOLUTE: 0.5 E9/L (ref 0.1–0.95)
MONOCYTES ABSOLUTE: 0.6 E9/L (ref 0.1–0.95)
MONOCYTES ABSOLUTE: 0.7 E9/L (ref 0.1–0.95)
MONOCYTES ABSOLUTE: 0.71 E9/L (ref 0.1–0.95)
MONOCYTES ABSOLUTE: 0.74 E9/L (ref 0.1–0.95)
MONOCYTES ABSOLUTE: 0.74 E9/L (ref 0.1–0.95)
MONOCYTES ABSOLUTE: 0.84 E9/L (ref 0.1–0.95)
MONOCYTES ABSOLUTE: 0.91 E9/L (ref 0.1–0.95)
MONOCYTES ABSOLUTE: 0.92 E9/L (ref 0.1–0.95)
MONOCYTES ABSOLUTE: 0.96 E9/L (ref 0.1–0.95)
MONOCYTES ABSOLUTE: 1.02 E9/L (ref 0.1–0.95)
MONOCYTES ABSOLUTE: 1.06 E9/L (ref 0.1–0.95)
MONOCYTES ABSOLUTE: 1.31 E9/L (ref 0.1–0.95)
MONOCYTES ABSOLUTE: 1.91 E9/L (ref 0.1–0.95)
MONOCYTES RELATIVE PERCENT: 0.9 % (ref 2–12)
MONOCYTES RELATIVE PERCENT: 1 % (ref 2–12)
MONOCYTES RELATIVE PERCENT: 10 % (ref 2–12)
MONOCYTES RELATIVE PERCENT: 10.9 % (ref 2–12)
MONOCYTES RELATIVE PERCENT: 2.6 % (ref 2–12)
MONOCYTES RELATIVE PERCENT: 2.6 % (ref 2–12)
MONOCYTES RELATIVE PERCENT: 3.5 % (ref 2–12)
MONOCYTES RELATIVE PERCENT: 4.2 % (ref 2–12)
MONOCYTES RELATIVE PERCENT: 4.3 % (ref 2–12)
MONOCYTES RELATIVE PERCENT: 4.9 % (ref 2–12)
MONOCYTES RELATIVE PERCENT: 5 % (ref 2–12)
MONOCYTES RELATIVE PERCENT: 5 % (ref 2–12)
MONOCYTES RELATIVE PERCENT: 5.2 % (ref 2–12)
MONOCYTES RELATIVE PERCENT: 5.2 % (ref 2–12)
MONOCYTES RELATIVE PERCENT: 6 % (ref 2–12)
MONOCYTES RELATIVE PERCENT: 6 % (ref 2–12)
MONOCYTES RELATIVE PERCENT: 7 % (ref 2–12)
MONOCYTES RELATIVE PERCENT: 8 % (ref 2–12)
MONOCYTES RELATIVE PERCENT: 8.9 % (ref 2–12)
MONOCYTES RELATIVE PERCENT: 9.9 % (ref 2–12)
MRSA CULTURE ONLY: NORMAL
MYELOCYTE PERCENT: 0.9 % (ref 0–0)
MYELOCYTE PERCENT: 1 % (ref 0–0)
MYELOCYTE PERCENT: 1.8 % (ref 0–0)
MYELOCYTE PERCENT: 2 % (ref 0–0)
MYELOCYTE PERCENT: 5 % (ref 0–0)
NEUTROPHILS ABSOLUTE: 10.23 E9/L (ref 1.8–7.3)
NEUTROPHILS ABSOLUTE: 11.35 E9/L (ref 1.8–7.3)
NEUTROPHILS ABSOLUTE: 12.04 E9/L (ref 1.8–7.3)
NEUTROPHILS ABSOLUTE: 12.31 E9/L (ref 1.8–7.3)
NEUTROPHILS ABSOLUTE: 12.55 E9/L (ref 1.8–7.3)
NEUTROPHILS ABSOLUTE: 13.1 E9/L (ref 1.8–7.3)
NEUTROPHILS ABSOLUTE: 13.44 E9/L (ref 1.8–7.3)
NEUTROPHILS ABSOLUTE: 13.52 E9/L (ref 1.8–7.3)
NEUTROPHILS ABSOLUTE: 15.58 E9/L (ref 1.8–7.3)
NEUTROPHILS ABSOLUTE: 16.64 E9/L (ref 1.8–7.3)
NEUTROPHILS ABSOLUTE: 16.81 E9/L (ref 1.8–7.3)
NEUTROPHILS ABSOLUTE: 20.56 E9/L (ref 1.8–7.3)
NEUTROPHILS ABSOLUTE: 6.56 E9/L (ref 1.8–7.3)
NEUTROPHILS ABSOLUTE: 6.66 E9/L (ref 1.8–7.3)
NEUTROPHILS ABSOLUTE: 7.46 E9/L (ref 1.8–7.3)
NEUTROPHILS ABSOLUTE: 7.88 E9/L (ref 1.8–7.3)
NEUTROPHILS ABSOLUTE: 7.9 E9/L (ref 1.8–7.3)
NEUTROPHILS ABSOLUTE: 8.24 E9/L (ref 1.8–7.3)
NEUTROPHILS ABSOLUTE: 8.34 E9/L (ref 1.8–7.3)
NEUTROPHILS ABSOLUTE: 9.29 E9/L (ref 1.8–7.3)
NEUTROPHILS RELATIVE PERCENT: 73.1 % (ref 43–80)
NEUTROPHILS RELATIVE PERCENT: 74 % (ref 43–80)
NEUTROPHILS RELATIVE PERCENT: 74 % (ref 43–80)
NEUTROPHILS RELATIVE PERCENT: 74.3 % (ref 43–80)
NEUTROPHILS RELATIVE PERCENT: 75 % (ref 43–80)
NEUTROPHILS RELATIVE PERCENT: 75.5 % (ref 43–80)
NEUTROPHILS RELATIVE PERCENT: 76.2 % (ref 43–80)
NEUTROPHILS RELATIVE PERCENT: 80.6 % (ref 43–80)
NEUTROPHILS RELATIVE PERCENT: 83.8 % (ref 43–80)
NEUTROPHILS RELATIVE PERCENT: 84 % (ref 43–80)
NEUTROPHILS RELATIVE PERCENT: 85 % (ref 43–80)
NEUTROPHILS RELATIVE PERCENT: 86 % (ref 43–80)
NEUTROPHILS RELATIVE PERCENT: 86 % (ref 43–80)
NEUTROPHILS RELATIVE PERCENT: 86.2 % (ref 43–80)
NEUTROPHILS RELATIVE PERCENT: 86.9 % (ref 43–80)
NEUTROPHILS RELATIVE PERCENT: 87 % (ref 43–80)
NEUTROPHILS RELATIVE PERCENT: 87.6 % (ref 43–80)
NEUTROPHILS RELATIVE PERCENT: 88.7 % (ref 43–80)
NEUTROPHILS RELATIVE PERCENT: 91.3 % (ref 43–80)
NEUTROPHILS RELATIVE PERCENT: 92 % (ref 43–80)
NITRITE, URINE: NEGATIVE
NUCLEATED RED BLOOD CELLS: 0 /100 WBC
NUCLEATED RED BLOOD CELLS: 1 /100 WBC
NUCLEATED RED BLOOD CELLS: 1.7 /100 WBC
NUCLEATED RED BLOOD CELLS: 1.7 /100 WBC
NUCLEATED RED BLOOD CELLS: 2 /100 WBC
NUCLEATED RED BLOOD CELLS: 2.7 /100 WBC
O2 CONTENT: 13.6 ML/DL
O2 CONTENT: 13.7 ML/DL
O2 CONTENT: 14.6 ML/DL
O2 CONTENT: 14.9 ML/DL
O2 CONTENT: 15.7 ML/DL
O2 CONTENT: 15.9 ML/DL
O2 CONTENT: 16 ML/DL
O2 CONTENT: 16.3 ML/DL
O2 CONTENT: 16.7 ML/DL
O2 CONTENT: 16.8 ML/DL
O2 CONTENT: 16.9 ML/DL
O2 CONTENT: 17.2 ML/DL
O2 CONTENT: 18.2 ML/DL
O2 CONTENT: 18.6 ML/DL
O2 CONTENT: 18.6 ML/DL
O2 CONTENT: 19 ML/DL
O2 CONTENT: 19.1 ML/DL
O2 CONTENT: 19.5 ML/DL
O2 CONTENT: 19.5 ML/DL
O2 CONTENT: 19.6 ML/DL
O2 CONTENT: 19.9 ML/DL
O2 CONTENT: 20.3 ML/DL
O2 CONTENT: 24.8 ML/DL
O2 SATURATION: 79.1 %
O2 SATURATION: 86.4 % (ref 92–98.5)
O2 SATURATION: 86.9 % (ref 92–98.5)
O2 SATURATION: 89.3 % (ref 92–98.5)
O2 SATURATION: 90.2 % (ref 92–98.5)
O2 SATURATION: 90.2 % (ref 92–98.5)
O2 SATURATION: 90.4 % (ref 92–98.5)
O2 SATURATION: 91.2 % (ref 92–98.5)
O2 SATURATION: 91.6 % (ref 92–98.5)
O2 SATURATION: 92 % (ref 92–98.5)
O2 SATURATION: 92 % (ref 92–98.5)
O2 SATURATION: 92.6 % (ref 92–98.5)
O2 SATURATION: 93.2 % (ref 92–98.5)
O2 SATURATION: 93.3 % (ref 92–98.5)
O2 SATURATION: 93.5 % (ref 92–98.5)
O2 SATURATION: 93.9 % (ref 92–98.5)
O2 SATURATION: 94.2 % (ref 92–98.5)
O2 SATURATION: 94.5 % (ref 92–98.5)
O2 SATURATION: 95.2 % (ref 92–98.5)
O2 SATURATION: 95.3 % (ref 92–98.5)
O2 SATURATION: 95.4 % (ref 92–98.5)
O2 SATURATION: 95.4 % (ref 92–98.5)
O2 SATURATION: 95.9 % (ref 92–98.5)
O2 SATURATION: 96.1 % (ref 92–98.5)
O2 SATURATION: 96.3 % (ref 92–98.5)
O2 SATURATION: 97.8 % (ref 92–98.5)
O2HB: 78.4 %
O2HB: 85.1 % (ref 94–97)
O2HB: 86.1 % (ref 94–97)
O2HB: 88.3 % (ref 94–97)
O2HB: 89.2 % (ref 94–97)
O2HB: 89.3 % (ref 94–97)
O2HB: 89.6 % (ref 94–97)
O2HB: 90 % (ref 94–97)
O2HB: 90.9 % (ref 94–97)
O2HB: 90.9 % (ref 94–97)
O2HB: 91.7 % (ref 94–97)
O2HB: 92.6 % (ref 94–97)
O2HB: 92.8 % (ref 94–97)
O2HB: 92.8 % (ref 94–97)
O2HB: 93.1 % (ref 94–97)
O2HB: 93.2 % (ref 94–97)
O2HB: 93.8 % (ref 94–97)
O2HB: 94.4 % (ref 94–97)
O2HB: 94.4 % (ref 94–97)
O2HB: 94.5 % (ref 94–97)
O2HB: 94.7 % (ref 94–97)
O2HB: 94.9 % (ref 94–97)
O2HB: 95.1 % (ref 94–97)
O2HB: 95.5 % (ref 94–97)
O2HB: 96.9 % (ref 94–97)
OPERATOR ID: 101
OPERATOR ID: 166
OPERATOR ID: 1661
OPERATOR ID: 1687
OPERATOR ID: 1687
OPERATOR ID: 2485
OPERATOR ID: 2485
OPERATOR ID: 2863
OPERATOR ID: 3114
OPERATOR ID: 316
OPERATOR ID: 3342
OPERATOR ID: 3342
OPERATOR ID: 420
OPERATOR ID: 925
OPERATOR ID: ABNORMAL
ORGANISM: ABNORMAL
OSMOLALITY URINE: 124 MOSM/KG (ref 300–900)
OSMOLALITY: 362 MOSM/KG (ref 285–310)
OVALOCYTES: ABNORMAL
OVALOCYTES: ABNORMAL
PATIENT TEMP: 37
PATIENT TEMP: 37 C
PCO2 (TEMP CORRECTED): 73 MMHG (ref 35–45)
PCO2: 36.8 MMHG (ref 40–52)
PCO2: 37.2 MMHG (ref 35–45)
PCO2: 37.6 MMHG (ref 35–45)
PCO2: 38.2 MMHG (ref 35–45)
PCO2: 40.8 MMHG (ref 35–45)
PCO2: 41.3 MMHG (ref 35–45)
PCO2: 42.4 MMHG (ref 35–45)
PCO2: 42.9 MMHG (ref 35–45)
PCO2: 43.4 MMHG (ref 35–45)
PCO2: 44.7 MMHG (ref 35–45)
PCO2: 44.8 MMHG (ref 35–45)
PCO2: 50.7 MMHG (ref 35–45)
PCO2: 51.3 MMHG (ref 35–45)
PCO2: 52.5 MMHG (ref 35–45)
PCO2: 54.4 MMHG (ref 35–45)
PCO2: 60.8 MMHG (ref 35–45)
PCO2: 62.3 MMHG (ref 35–45)
PCO2: 65.6 MMHG (ref 35–45)
PCO2: 66.4 MMHG (ref 35–45)
PCO2: 69.9 MMHG (ref 35–45)
PCO2: 71.9 MMHG (ref 35–45)
PCO2: 72.7 MMHG (ref 35–45)
PCO2: 77.2 MMHG (ref 35–45)
PCO2: 81 MMHG (ref 35–45)
PCO2: 96.9 MMHG (ref 35–45)
PDW BLD-RTO: 14.7 FL (ref 11.5–15)
PDW BLD-RTO: 14.8 FL (ref 11.5–15)
PDW BLD-RTO: 15 FL (ref 11.5–15)
PDW BLD-RTO: 15.2 FL (ref 11.5–15)
PDW BLD-RTO: 15.3 FL (ref 11.5–15)
PDW BLD-RTO: 15.5 FL (ref 11.5–15)
PDW BLD-RTO: 15.8 FL (ref 11.5–15)
PDW BLD-RTO: 15.9 FL (ref 11.5–15)
PDW BLD-RTO: 16 FL (ref 11.5–15)
PDW BLD-RTO: 16.2 FL (ref 11.5–15)
PDW BLD-RTO: 16.7 FL (ref 11.5–15)
PDW BLD-RTO: 17.2 FL (ref 11.5–15)
PDW BLD-RTO: 17.7 FL (ref 11.5–15)
PEEP/CPAP: 10 CMH2O
PEEP/CPAP: 12 CMH2O
PEEP/CPAP: 14 CMH2O
PEEP/CPAP: 8 CMH2O
PFO2: 0.68 MMHG/%
PFO2: 0.69 MMHG/%
PFO2: 0.72 MMHG/%
PFO2: 0.76 MMHG/%
PFO2: 0.77 MMHG/%
PFO2: 0.79 MMHG/%
PFO2: 0.8 MMHG/%
PFO2: 0.82 MMHG/%
PFO2: 0.84 MMHG/%
PFO2: 0.87 MMHG/%
PFO2: 0.88 MMHG/%
PFO2: 0.89 MMHG/%
PFO2: 0.9 MMHG/%
PFO2: 0.96 MMHG/%
PFO2: 0.99 MMHG/%
PFO2: 1.15 MMHG/%
PFO2: 1.18 MMHG/%
PFO2: 1.21 MMHG/%
PFO2: 1.21 MMHG/%
PFO2: 1.27 MMHG/%
PFO2: 1.28 MMHG/%
PFO2: 1.31 MMHG/%
PFO2: 1.4 MMHG/%
PFO2: 1.56 MMHG/%
PH (TEMPERATURE CORRECTED): 7.25 (ref 7.35–7.45)
PH BLOOD GAS: 7.21 (ref 7.35–7.45)
PH BLOOD GAS: 7.23 (ref 7.35–7.45)
PH BLOOD GAS: 7.28 (ref 7.35–7.45)
PH BLOOD GAS: 7.29 (ref 7.35–7.45)
PH BLOOD GAS: 7.31 (ref 7.35–7.45)
PH BLOOD GAS: 7.32 (ref 7.35–7.45)
PH BLOOD GAS: 7.32 (ref 7.35–7.45)
PH BLOOD GAS: 7.34 (ref 7.35–7.45)
PH BLOOD GAS: 7.34 (ref 7.35–7.45)
PH BLOOD GAS: 7.35 (ref 7.35–7.45)
PH BLOOD GAS: 7.36 (ref 7.35–7.45)
PH BLOOD GAS: 7.38 (ref 7.35–7.45)
PH BLOOD GAS: 7.39 (ref 7.35–7.45)
PH BLOOD GAS: 7.39 (ref 7.35–7.45)
PH BLOOD GAS: 7.4 (ref 7.35–7.45)
PH BLOOD GAS: 7.4 (ref 7.3–7.42)
PH BLOOD GAS: 7.42 (ref 7.35–7.45)
PH BLOOD GAS: 7.43 (ref 7.35–7.45)
PH BLOOD GAS: 7.44 (ref 7.35–7.45)
PH BLOOD GAS: 7.44 (ref 7.35–7.45)
PH BLOOD GAS: 7.45 (ref 7.35–7.45)
PH UA: 5.5 (ref 5–9)
PHOSPHORUS: 2.1 MG/DL (ref 2.5–4.5)
PHOSPHORUS: 3.1 MG/DL (ref 2.5–4.5)
PHOSPHORUS: 3.1 MG/DL (ref 2.5–4.5)
PHOSPHORUS: 3.2 MG/DL (ref 2.5–4.5)
PHOSPHORUS: 3.3 MG/DL (ref 2.5–4.5)
PHOSPHORUS: 3.3 MG/DL (ref 2.5–4.5)
PHOSPHORUS: 3.4 MG/DL (ref 2.5–4.5)
PHOSPHORUS: 3.5 MG/DL (ref 2.5–4.5)
PHOSPHORUS: 3.6 MG/DL (ref 2.5–4.5)
PHOSPHORUS: 3.7 MG/DL (ref 2.5–4.5)
PHOSPHORUS: 3.7 MG/DL (ref 2.5–4.5)
PHOSPHORUS: 4 MG/DL (ref 2.5–4.5)
PHOSPHORUS: 4.3 MG/DL (ref 2.5–4.5)
PHOSPHORUS: 4.6 MG/DL (ref 2.5–4.5)
PHOSPHORUS: 4.6 MG/DL (ref 2.5–4.5)
PIP: 14 CMH2O
PLATELET # BLD: 106 E9/L (ref 130–450)
PLATELET # BLD: 122 E9/L (ref 130–450)
PLATELET # BLD: 131 E9/L (ref 130–450)
PLATELET # BLD: 139 E9/L (ref 130–450)
PLATELET # BLD: 152 E9/L (ref 130–450)
PLATELET # BLD: 177 E9/L (ref 130–450)
PLATELET # BLD: 189 E9/L (ref 130–450)
PLATELET # BLD: 227 E9/L (ref 130–450)
PLATELET # BLD: 238 E9/L (ref 130–450)
PLATELET # BLD: 246 E9/L (ref 130–450)
PLATELET # BLD: 289 E9/L (ref 130–450)
PLATELET # BLD: 302 E9/L (ref 130–450)
PLATELET # BLD: 345 E9/L (ref 130–450)
PLATELET # BLD: 356 E9/L (ref 130–450)
PLATELET # BLD: 371 E9/L (ref 130–450)
PLATELET # BLD: 383 E9/L (ref 130–450)
PLATELET # BLD: 386 E9/L (ref 130–450)
PLATELET # BLD: 386 E9/L (ref 130–450)
PLATELET # BLD: 433 E9/L (ref 130–450)
PLATELET # BLD: 454 E9/L (ref 130–450)
PMV BLD AUTO: 10.1 FL (ref 7–12)
PMV BLD AUTO: 10.2 FL (ref 7–12)
PMV BLD AUTO: 10.4 FL (ref 7–12)
PMV BLD AUTO: 10.5 FL (ref 7–12)
PMV BLD AUTO: 10.6 FL (ref 7–12)
PMV BLD AUTO: 10.7 FL (ref 7–12)
PMV BLD AUTO: 10.8 FL (ref 7–12)
PMV BLD AUTO: 10.9 FL (ref 7–12)
PMV BLD AUTO: 11.1 FL (ref 7–12)
PMV BLD AUTO: 11.2 FL (ref 7–12)
PMV BLD AUTO: 11.3 FL (ref 7–12)
PMV BLD AUTO: 11.4 FL (ref 7–12)
PMV BLD AUTO: 11.4 FL (ref 7–12)
PMV BLD AUTO: 11.5 FL (ref 7–12)
PMV BLD AUTO: 11.7 FL (ref 7–12)
PMV BLD AUTO: 12.6 FL (ref 7–12)
PO2 (TEMP CORRECTED): 76.7 MMHG (ref 60–80)
PO2: 109.3 MMHG (ref 75–100)
PO2: 44.4 MMHG (ref 30–50)
PO2: 52 MMHG (ref 75–100)
PO2: 54.2 MMHG (ref 75–100)
PO2: 57.6 MMHG (ref 75–100)
PO2: 60.9 MMHG (ref 75–100)
PO2: 64.9 MMHG (ref 75–100)
PO2: 65.9 MMHG (ref 75–100)
PO2: 66.8 MMHG (ref 75–100)
PO2: 68.2 MMHG (ref 75–100)
PO2: 69.5 MMHG (ref 75–100)
PO2: 71.9 MMHG (ref 75–100)
PO2: 71.9 MMHG (ref 75–100)
PO2: 72.6 MMHG (ref 75–100)
PO2: 75.6 MMHG (ref 75–100)
PO2: 76.2 MMHG (ref 75–100)
PO2: 76.5 MMHG (ref 75–100)
PO2: 77.1 MMHG (ref 75–100)
PO2: 77.9 MMHG (ref 75–100)
PO2: 78.8 MMHG (ref 75–100)
PO2: 82.7 MMHG (ref 75–100)
PO2: 84.1 MMHG (ref 75–100)
PO2: 88.5 MMHG (ref 75–100)
PO2: 89.1 MMHG (ref 75–100)
PO2: 89.5 MMHG (ref 75–100)
POIKILOCYTES: ABNORMAL
POLYCHROMASIA: ABNORMAL
POSITIVE END EXP PRESS: 14 CMH2O
POTASSIUM REFLEX MAGNESIUM: 3.7 MMOL/L (ref 3.5–5)
POTASSIUM REFLEX MAGNESIUM: 3.7 MMOL/L (ref 3.5–5)
POTASSIUM REFLEX MAGNESIUM: 3.8 MMOL/L (ref 3.5–5)
POTASSIUM REFLEX MAGNESIUM: 4 MMOL/L (ref 3.5–5)
POTASSIUM REFLEX MAGNESIUM: 4.2 MMOL/L (ref 3.5–5)
POTASSIUM SERPL-SCNC: 3.2 MMOL/L (ref 3.5–5)
POTASSIUM SERPL-SCNC: 3.7 MMOL/L (ref 3.5–5)
POTASSIUM SERPL-SCNC: 3.8 MMOL/L (ref 3.5–5)
POTASSIUM SERPL-SCNC: 4 MMOL/L (ref 3.5–5)
POTASSIUM SERPL-SCNC: 4 MMOL/L (ref 3.5–5)
POTASSIUM SERPL-SCNC: 4.2 MMOL/L (ref 3.5–5)
POTASSIUM SERPL-SCNC: 4.2 MMOL/L (ref 3.5–5)
POTASSIUM SERPL-SCNC: 4.3 MMOL/L (ref 3.5–5)
POTASSIUM SERPL-SCNC: 4.5 MMOL/L (ref 3.5–5)
POTASSIUM SERPL-SCNC: 4.5 MMOL/L (ref 3.5–5)
POTASSIUM SERPL-SCNC: 4.6 MMOL/L (ref 3.5–5)
POTASSIUM SERPL-SCNC: 4.6 MMOL/L (ref 3.5–5)
POTASSIUM SERPL-SCNC: 4.7 MMOL/L (ref 3.5–5)
POTASSIUM SERPL-SCNC: 4.8 MMOL/L (ref 3.5–5)
POTASSIUM SERPL-SCNC: 4.8 MMOL/L (ref 3.5–5)
POTASSIUM SERPL-SCNC: 5.1 MMOL/L (ref 3.5–5)
POTASSIUM SERPL-SCNC: 5.2 MMOL/L (ref 3.5–5)
POTASSIUM, UR: 22 MMOL/L
POTASSIUM, UR: 23.4 MMOL/L
POTASSIUM, UR: 4.2 MMOL/L
PRO-BNP: 641 PG/ML (ref 0–125)
PROCALCITONIN: 0.46 NG/ML (ref 0–0.08)
PROCALCITONIN: 0.64 NG/ML (ref 0–0.08)
PROCALCITONIN: 0.79 NG/ML (ref 0–0.08)
PROTEIN UA: NEGATIVE MG/DL
RBC # BLD: 3.15 E12/L (ref 3.8–5.8)
RBC # BLD: 3.47 E12/L (ref 3.8–5.8)
RBC # BLD: 3.59 E12/L (ref 3.8–5.8)
RBC # BLD: 3.61 E12/L (ref 3.8–5.8)
RBC # BLD: 4.12 E12/L (ref 3.8–5.8)
RBC # BLD: 4.21 E12/L (ref 3.8–5.8)
RBC # BLD: 4.28 E12/L (ref 3.8–5.8)
RBC # BLD: 4.37 E12/L (ref 3.8–5.8)
RBC # BLD: 4.54 E12/L (ref 3.8–5.8)
RBC # BLD: 4.62 E12/L (ref 3.8–5.8)
RBC # BLD: 4.72 E12/L (ref 3.8–5.8)
RBC # BLD: 4.78 E12/L (ref 3.8–5.8)
RBC # BLD: 4.81 E12/L (ref 3.8–5.8)
RBC # BLD: 4.85 E12/L (ref 3.8–5.8)
RBC # BLD: 4.86 E12/L (ref 3.8–5.8)
RBC # BLD: 5 E12/L (ref 3.8–5.8)
RBC # BLD: 5.02 E12/L (ref 3.8–5.8)
RBC # BLD: 5.11 E12/L (ref 3.8–5.8)
RBC # BLD: 5.32 E12/L (ref 3.8–5.8)
RBC # BLD: 5.41 E12/L (ref 3.8–5.8)
RBC # BLD: NORMAL 10*6/UL
REASON FOR REJECTION: NORMAL
REJECTED TEST: NORMAL
REPORT: NORMAL
RESPIRATORY RATE: 30 B/MIN
RI(T): 280 %
RI(T): 334 %
RI(T): 357 %
RI(T): 377 %
RI(T): 382 %
RI(T): 410 %
RI(T): 412 %
RI(T): 414 %
RI(T): 416 %
RI(T): 514 %
RI(T): 518 %
RI(T): 527 %
RI(T): 536 %
RI(T): 609 %
RI(T): 613 %
RI(T): 624 %
RI(T): 630 %
RI(T): 646 %
RI(T): 698 %
RI(T): 700 %
RI(T): 721 %
RI(T): 795 %
RI(T): 812 %
RI(T): 828 %
ROULEAUX: ABNORMAL
RR MECHANICAL: 14 B/MIN
RR MECHANICAL: 20 B/MIN
RR MECHANICAL: 20 B/MIN
RR MECHANICAL: 26 B/MIN
RR MECHANICAL: 28 B/MIN
RR MECHANICAL: 28 B/MIN
RR MECHANICAL: 30 B/MIN
SARS-COV-2, NAAT: DETECTED
SARS-COV-2, NAAT: DETECTED
SEDIMENTATION RATE, ERYTHROCYTE: 43 MM/HR (ref 0–15)
SEDIMENTATION RATE, ERYTHROCYTE: 86 MM/HR (ref 0–15)
SMEAR, RESPIRATORY: ABNORMAL
SMEAR, RESPIRATORY: NORMAL
SMEAR, RESPIRATORY: NORMAL
SODIUM BLD-SCNC: 139 MMOL/L (ref 132–146)
SODIUM BLD-SCNC: 140 MMOL/L (ref 132–146)
SODIUM BLD-SCNC: 141 MMOL/L (ref 132–146)
SODIUM BLD-SCNC: 141 MMOL/L (ref 132–146)
SODIUM BLD-SCNC: 142 MMOL/L (ref 132–146)
SODIUM BLD-SCNC: 143 MMOL/L (ref 132–146)
SODIUM BLD-SCNC: 144 MMOL/L (ref 132–146)
SODIUM BLD-SCNC: 144 MMOL/L (ref 132–146)
SODIUM BLD-SCNC: 145 MMOL/L (ref 132–146)
SODIUM BLD-SCNC: 146 MMOL/L (ref 132–146)
SODIUM BLD-SCNC: 147 MMOL/L (ref 132–146)
SODIUM BLD-SCNC: 148 MMOL/L (ref 132–146)
SODIUM BLD-SCNC: 159 MMOL/L (ref 132–146)
SODIUM BLD-SCNC: 169 MMOL/L (ref 132–146)
SODIUM URINE: 27 MMOL/L
SODIUM URINE: <20 MMOL/L
SODIUM URINE: <20 MMOL/L
SOURCE, BLOOD GAS: ABNORMAL
SPECIFIC GRAVITY UA: 1.01 (ref 1–1.03)
STREP PNEUMONIAE ANTIGEN, URINE: NORMAL
TARGET CELLS: ABNORMAL
TEAR DROP CELLS: ABNORMAL
TEAR DROP CELLS: ABNORMAL
THB: 10.6 G/DL (ref 11.5–16.5)
THB: 10.8 G/DL (ref 11.5–16.5)
THB: 11.2 G/DL (ref 11.5–16.5)
THB: 11.4 G/DL (ref 11.5–16.5)
THB: 12.5 G/DL (ref 11.5–16.5)
THB: 12.6 G/DL (ref 11.5–16.5)
THB: 12.6 G/DL (ref 11.5–16.5)
THB: 12.9 G/DL (ref 11.5–16.5)
THB: 13.2 G/DL (ref 11.5–16.5)
THB: 13.3 G/DL (ref 11.5–16.5)
THB: 13.4 G/DL (ref 11.5–16.5)
THB: 13.5 G/DL (ref 11.5–16.5)
THB: 13.7 G/DL (ref 11.5–16.5)
THB: 13.9 G/DL (ref 11.5–16.5)
THB: 13.9 G/DL (ref 11.5–16.5)
THB: 14 G/DL (ref 11.5–16.5)
THB: 14.2 G/DL (ref 11.5–16.5)
THB: 14.5 G/DL (ref 11.5–16.5)
THB: 14.6 G/DL (ref 11.5–16.5)
THB: 15 G/DL (ref 11.5–16.5)
THB: 15.1 G/DL (ref 11.5–16.5)
THB: 15.4 G/DL (ref 11.5–16.5)
THB: 15.6 G/DL (ref 11.5–16.5)
THB: 15.7 G/DL (ref 11.5–16.5)
THB: 19.5 G/DL (ref 11.5–16.5)
THIS TEST SENT TO: NORMAL
TIDAL VOLUME: 400 ML
TIME ANALYZED: 1210
TIME ANALYZED: 1259
TIME ANALYZED: 1316
TIME ANALYZED: 1404
TIME ANALYZED: 1518
TIME ANALYZED: 1621
TIME ANALYZED: 1712
TIME ANALYZED: 2049
TIME ANALYZED: 306
TIME ANALYZED: 35
TIME ANALYZED: 424
TIME ANALYZED: 525
TIME ANALYZED: 527
TIME ANALYZED: 532
TIME ANALYZED: 539
TIME ANALYZED: 540
TIME ANALYZED: 550
TIME ANALYZED: 554
TIME ANALYZED: 556
TIME ANALYZED: 602
TIME ANALYZED: 620
TIME ANALYZED: 620
TIME ANALYZED: 626
TIME ANALYZED: 648
TIME ANALYZED: 717
TOTAL PROTEIN: 4.7 G/DL (ref 6.4–8.3)
TOTAL PROTEIN: 5.4 G/DL (ref 6.4–8.3)
TOTAL PROTEIN: 5.5 G/DL (ref 6.4–8.3)
TOTAL PROTEIN: 5.5 G/DL (ref 6.4–8.3)
TOTAL PROTEIN: 5.6 G/DL (ref 6.4–8.3)
TOTAL PROTEIN: 5.7 G/DL (ref 6.4–8.3)
TOTAL PROTEIN: 5.8 G/DL (ref 6.4–8.3)
TOTAL PROTEIN: 5.8 G/DL (ref 6.4–8.3)
TOTAL PROTEIN: 5.9 G/DL (ref 6.4–8.3)
TOTAL PROTEIN: 5.9 G/DL (ref 6.4–8.3)
TOTAL PROTEIN: 6.1 G/DL (ref 6.4–8.3)
TOTAL PROTEIN: 6.2 G/DL (ref 6.4–8.3)
TOTAL PROTEIN: 6.2 G/DL (ref 6.4–8.3)
TOTAL PROTEIN: 7 G/DL (ref 6.4–8.3)
TOTAL PROTEIN: 7 G/DL (ref 6.4–8.3)
TOTAL PROTEIN: 7.4 G/DL (ref 6.4–8.3)
TOTAL PROTEIN: 7.6 G/DL (ref 6.4–8.3)
TOTAL PROTEIN: 7.9 G/DL (ref 6.4–8.3)
TRIGL SERPL-MCNC: 217 MG/DL (ref 0–149)
TRIGL SERPL-MCNC: 308 MG/DL (ref 0–149)
TROPONIN: <0.01 NG/ML (ref 0–0.03)
TROPONIN: <0.01 NG/ML (ref 0–0.03)
URINE CULTURE, ROUTINE: NORMAL
UROBILINOGEN, URINE: 0.2 E.U./DL
VACUOLATED NEUTROPHILS: ABNORMAL
VACUOLATED NEUTROPHILS: ABNORMAL
VANCOMYCIN TROUGH: 17.6 MCG/ML (ref 5–16)
VANCOMYCIN TROUGH: 24.2 MCG/ML (ref 5–16)
VT MECHANICAL: 400 ML
VT MECHANICAL: 500 ML
WBC # BLD: 10.1 E9/L (ref 4.5–11.5)
WBC # BLD: 10.2 E9/L (ref 4.5–11.5)
WBC # BLD: 10.4 E9/L (ref 4.5–11.5)
WBC # BLD: 10.5 E9/L (ref 4.5–11.5)
WBC # BLD: 11 E9/L (ref 4.5–11.5)
WBC # BLD: 11.9 E9/L (ref 4.5–11.5)
WBC # BLD: 13.2 E9/L (ref 4.5–11.5)
WBC # BLD: 14 E9/L (ref 4.5–11.5)
WBC # BLD: 14.1 E9/L (ref 4.5–11.5)
WBC # BLD: 14.4 E9/L (ref 4.5–11.5)
WBC # BLD: 14.5 E9/L (ref 4.5–11.5)
WBC # BLD: 14.7 E9/L (ref 4.5–11.5)
WBC # BLD: 16.4 E9/L (ref 4.5–11.5)
WBC # BLD: 16.8 E9/L (ref 4.5–11.5)
WBC # BLD: 19.1 E9/L (ref 4.5–11.5)
WBC # BLD: 21.9 E9/L (ref 4.5–11.5)
WBC # BLD: 23.1 E9/L (ref 4.5–11.5)
WBC # BLD: 8.3 E9/L (ref 4.5–11.5)
WBC # BLD: 8.9 E9/L (ref 4.5–11.5)
WBC # BLD: 9.8 E9/L (ref 4.5–11.5)

## 2020-01-01 PROCEDURE — 6370000000 HC RX 637 (ALT 250 FOR IP): Performed by: INTERNAL MEDICINE

## 2020-01-01 PROCEDURE — 6360000002 HC RX W HCPCS: Performed by: INTERNAL MEDICINE

## 2020-01-01 PROCEDURE — 6360000002 HC RX W HCPCS: Performed by: EMERGENCY MEDICINE

## 2020-01-01 PROCEDURE — 2580000003 HC RX 258: Performed by: EMERGENCY MEDICINE

## 2020-01-01 PROCEDURE — 2500000003 HC RX 250 WO HCPCS: Performed by: INTERNAL MEDICINE

## 2020-01-01 PROCEDURE — 2580000003 HC RX 258: Performed by: INTERNAL MEDICINE

## 2020-01-01 PROCEDURE — 85378 FIBRIN DEGRADE SEMIQUANT: CPT

## 2020-01-01 PROCEDURE — 99233 SBSQ HOSP IP/OBS HIGH 50: CPT | Performed by: INTERNAL MEDICINE

## 2020-01-01 PROCEDURE — 85730 THROMBOPLASTIN TIME PARTIAL: CPT

## 2020-01-01 PROCEDURE — 84484 ASSAY OF TROPONIN QUANT: CPT

## 2020-01-01 PROCEDURE — 87040 BLOOD CULTURE FOR BACTERIA: CPT

## 2020-01-01 PROCEDURE — 80053 COMPREHEN METABOLIC PANEL: CPT

## 2020-01-01 PROCEDURE — 71045 X-RAY EXAM CHEST 1 VIEW: CPT

## 2020-01-01 PROCEDURE — 83735 ASSAY OF MAGNESIUM: CPT

## 2020-01-01 PROCEDURE — 2580000003 HC RX 258: Performed by: SPECIALIST

## 2020-01-01 PROCEDURE — 94003 VENT MGMT INPAT SUBQ DAY: CPT

## 2020-01-01 PROCEDURE — 94002 VENT MGMT INPAT INIT DAY: CPT

## 2020-01-01 PROCEDURE — 85025 COMPLETE CBC W/AUTO DIFF WBC: CPT

## 2020-01-01 PROCEDURE — 87206 SMEAR FLUORESCENT/ACID STAI: CPT

## 2020-01-01 PROCEDURE — 82805 BLOOD GASES W/O2 SATURATION: CPT

## 2020-01-01 PROCEDURE — 6360000002 HC RX W HCPCS: Performed by: SPECIALIST

## 2020-01-01 PROCEDURE — 81003 URINALYSIS AUTO W/O SCOPE: CPT

## 2020-01-01 PROCEDURE — 82962 GLUCOSE BLOOD TEST: CPT

## 2020-01-01 PROCEDURE — 80048 BASIC METABOLIC PNL TOTAL CA: CPT

## 2020-01-01 PROCEDURE — 85651 RBC SED RATE NONAUTOMATED: CPT

## 2020-01-01 PROCEDURE — 87450 HC DIRECT STREP B ANTIGEN: CPT

## 2020-01-01 PROCEDURE — 84300 ASSAY OF URINE SODIUM: CPT

## 2020-01-01 PROCEDURE — 2500000003 HC RX 250 WO HCPCS: Performed by: SPECIALIST

## 2020-01-01 PROCEDURE — 94640 AIRWAY INHALATION TREATMENT: CPT

## 2020-01-01 PROCEDURE — 99223 1ST HOSP IP/OBS HIGH 75: CPT | Performed by: INTERNAL MEDICINE

## 2020-01-01 PROCEDURE — 83605 ASSAY OF LACTIC ACID: CPT

## 2020-01-01 PROCEDURE — 6360000002 HC RX W HCPCS: Performed by: STUDENT IN AN ORGANIZED HEALTH CARE EDUCATION/TRAINING PROGRAM

## 2020-01-01 PROCEDURE — 94660 CPAP INITIATION&MGMT: CPT

## 2020-01-01 PROCEDURE — 36620 INSERTION CATHETER ARTERY: CPT

## 2020-01-01 PROCEDURE — 6370000000 HC RX 637 (ALT 250 FOR IP): Performed by: EMERGENCY MEDICINE

## 2020-01-01 PROCEDURE — 87070 CULTURE OTHR SPECIMN AEROBIC: CPT

## 2020-01-01 PROCEDURE — 2000000000 HC ICU R&B

## 2020-01-01 PROCEDURE — 85384 FIBRINOGEN ACTIVITY: CPT

## 2020-01-01 PROCEDURE — 2060000000 HC ICU INTERMEDIATE R&B

## 2020-01-01 PROCEDURE — 82728 ASSAY OF FERRITIN: CPT

## 2020-01-01 PROCEDURE — 84133 ASSAY OF URINE POTASSIUM: CPT

## 2020-01-01 PROCEDURE — 2500000003 HC RX 250 WO HCPCS: Performed by: EMERGENCY MEDICINE

## 2020-01-01 PROCEDURE — 6360000002 HC RX W HCPCS: Performed by: NURSE PRACTITIONER

## 2020-01-01 PROCEDURE — 36415 COLL VENOUS BLD VENIPUNCTURE: CPT

## 2020-01-01 PROCEDURE — 86140 C-REACTIVE PROTEIN: CPT

## 2020-01-01 PROCEDURE — 93005 ELECTROCARDIOGRAM TRACING: CPT | Performed by: EMERGENCY MEDICINE

## 2020-01-01 PROCEDURE — 2700000000 HC OXYGEN THERAPY PER DAY

## 2020-01-01 PROCEDURE — 82436 ASSAY OF URINE CHLORIDE: CPT

## 2020-01-01 PROCEDURE — 83615 LACTATE (LD) (LDH) ENZYME: CPT

## 2020-01-01 PROCEDURE — 2580000003 HC RX 258

## 2020-01-01 PROCEDURE — 84100 ASSAY OF PHOSPHORUS: CPT

## 2020-01-01 PROCEDURE — C1751 CATH, INF, PER/CENT/MIDLINE: HCPCS

## 2020-01-01 PROCEDURE — C9113 INJ PANTOPRAZOLE SODIUM, VIA: HCPCS | Performed by: INTERNAL MEDICINE

## 2020-01-01 PROCEDURE — 37799 UNLISTED PX VASCULAR SURGERY: CPT

## 2020-01-01 PROCEDURE — 99232 SBSQ HOSP IP/OBS MODERATE 35: CPT | Performed by: PHYSICIAN ASSISTANT

## 2020-01-01 PROCEDURE — 83935 ASSAY OF URINE OSMOLALITY: CPT

## 2020-01-01 PROCEDURE — 84145 PROCALCITONIN (PCT): CPT

## 2020-01-01 PROCEDURE — 36592 COLLECT BLOOD FROM PICC: CPT

## 2020-01-01 PROCEDURE — 87205 SMEAR GRAM STAIN: CPT

## 2020-01-01 PROCEDURE — 36556 INSERT NON-TUNNEL CV CATH: CPT

## 2020-01-01 PROCEDURE — 05JY3ZZ INSPECTION OF UPPER VEIN, PERCUTANEOUS APPROACH: ICD-10-PCS | Performed by: INTERNAL MEDICINE

## 2020-01-01 PROCEDURE — 85014 HEMATOCRIT: CPT

## 2020-01-01 PROCEDURE — 87088 URINE BACTERIA CULTURE: CPT

## 2020-01-01 PROCEDURE — 36569 INSJ PICC 5 YR+ W/O IMAGING: CPT

## 2020-01-01 PROCEDURE — 82570 ASSAY OF URINE CREATININE: CPT

## 2020-01-01 PROCEDURE — 2500000003 HC RX 250 WO HCPCS

## 2020-01-01 PROCEDURE — 74176 CT ABD & PELVIS W/O CONTRAST: CPT

## 2020-01-01 PROCEDURE — 99232 SBSQ HOSP IP/OBS MODERATE 35: CPT | Performed by: INTERNAL MEDICINE

## 2020-01-01 PROCEDURE — 83690 ASSAY OF LIPASE: CPT

## 2020-01-01 PROCEDURE — 6370000000 HC RX 637 (ALT 250 FOR IP): Performed by: STUDENT IN AN ORGANIZED HEALTH CARE EDUCATION/TRAINING PROGRAM

## 2020-01-01 PROCEDURE — 82803 BLOOD GASES ANY COMBINATION: CPT

## 2020-01-01 PROCEDURE — 02HV33Z INSERTION OF INFUSION DEVICE INTO SUPERIOR VENA CAVA, PERCUTANEOUS APPROACH: ICD-10-PCS | Performed by: INTERNAL MEDICINE

## 2020-01-01 PROCEDURE — 84478 ASSAY OF TRIGLYCERIDES: CPT

## 2020-01-01 PROCEDURE — 2580000003 HC RX 258: Performed by: STUDENT IN AN ORGANIZED HEALTH CARE EDUCATION/TRAINING PROGRAM

## 2020-01-01 PROCEDURE — 6360000002 HC RX W HCPCS

## 2020-01-01 PROCEDURE — U0002 COVID-19 LAB TEST NON-CDC: HCPCS

## 2020-01-01 PROCEDURE — 83880 ASSAY OF NATRIURETIC PEPTIDE: CPT

## 2020-01-01 PROCEDURE — XW033E5 INTRODUCTION OF REMDESIVIR ANTI-INFECTIVE INTO PERIPHERAL VEIN, PERCUTANEOUS APPROACH, NEW TECHNOLOGY GROUP 5: ICD-10-PCS | Performed by: SPECIALIST

## 2020-01-01 PROCEDURE — 76937 US GUIDE VASCULAR ACCESS: CPT

## 2020-01-01 PROCEDURE — 5A1955Z RESPIRATORY VENTILATION, GREATER THAN 96 CONSECUTIVE HOURS: ICD-10-PCS | Performed by: INTERNAL MEDICINE

## 2020-01-01 PROCEDURE — 80202 ASSAY OF VANCOMYCIN: CPT

## 2020-01-01 PROCEDURE — 99221 1ST HOSP IP/OBS SF/LOW 40: CPT | Performed by: PHYSICIAN ASSISTANT

## 2020-01-01 PROCEDURE — 93005 ELECTROCARDIOGRAM TRACING: CPT | Performed by: STUDENT IN AN ORGANIZED HEALTH CARE EDUCATION/TRAINING PROGRAM

## 2020-01-01 PROCEDURE — 70450 CT HEAD/BRAIN W/O DYE: CPT

## 2020-01-01 PROCEDURE — 2580000003 HC RX 258: Performed by: NURSE PRACTITIONER

## 2020-01-01 PROCEDURE — 93010 ELECTROCARDIOGRAM REPORT: CPT | Performed by: INTERNAL MEDICINE

## 2020-01-01 PROCEDURE — 83930 ASSAY OF BLOOD OSMOLALITY: CPT

## 2020-01-01 PROCEDURE — 0BH18EZ INSERTION OF ENDOTRACHEAL AIRWAY INTO TRACHEA, VIA NATURAL OR ARTIFICIAL OPENING ENDOSCOPIC: ICD-10-PCS | Performed by: INTERNAL MEDICINE

## 2020-01-01 PROCEDURE — 96361 HYDRATE IV INFUSION ADD-ON: CPT

## 2020-01-01 PROCEDURE — 87077 CULTURE AEROBIC IDENTIFY: CPT

## 2020-01-01 PROCEDURE — 36410 VNPNXR 3YR/> PHY/QHP DX/THER: CPT

## 2020-01-01 PROCEDURE — 87186 SC STD MICRODIL/AGAR DIL: CPT

## 2020-01-01 PROCEDURE — 87081 CULTURE SCREEN ONLY: CPT

## 2020-01-01 PROCEDURE — 36600 WITHDRAWAL OF ARTERIAL BLOOD: CPT

## 2020-01-01 PROCEDURE — 83520 IMMUNOASSAY QUANT NOS NONAB: CPT

## 2020-01-01 PROCEDURE — 87449 NOS EACH ORGANISM AG IA: CPT

## 2020-01-01 PROCEDURE — 71250 CT THORAX DX C-: CPT

## 2020-01-01 PROCEDURE — 99239 HOSP IP/OBS DSCHRG MGMT >30: CPT | Performed by: INTERNAL MEDICINE

## 2020-01-01 PROCEDURE — 51702 INSERT TEMP BLADDER CATH: CPT

## 2020-01-01 PROCEDURE — 96360 HYDRATION IV INFUSION INIT: CPT

## 2020-01-01 PROCEDURE — 85018 HEMOGLOBIN: CPT

## 2020-01-01 PROCEDURE — 6370000000 HC RX 637 (ALT 250 FOR IP)

## 2020-01-01 PROCEDURE — 99291 CRITICAL CARE FIRST HOUR: CPT | Performed by: INTERNAL MEDICINE

## 2020-01-01 PROCEDURE — 94664 DEMO&/EVAL PT USE INHALER: CPT

## 2020-01-01 PROCEDURE — 05HF33Z INSERTION OF INFUSION DEVICE INTO LEFT CEPHALIC VEIN, PERCUTANEOUS APPROACH: ICD-10-PCS | Performed by: INTERNAL MEDICINE

## 2020-01-01 PROCEDURE — 84132 ASSAY OF SERUM POTASSIUM: CPT

## 2020-01-01 PROCEDURE — 82533 TOTAL CORTISOL: CPT

## 2020-01-01 PROCEDURE — 74018 RADEX ABDOMEN 1 VIEW: CPT

## 2020-01-01 PROCEDURE — 99285 EMERGENCY DEPT VISIT HI MDM: CPT

## 2020-01-01 RX ORDER — 0.9 % SODIUM CHLORIDE 0.9 %
1000 INTRAVENOUS SOLUTION INTRAVENOUS ONCE
Status: COMPLETED | OUTPATIENT
Start: 2020-01-01 | End: 2020-01-01

## 2020-01-01 RX ORDER — PROMETHAZINE HYDROCHLORIDE 25 MG/1
12.5 TABLET ORAL EVERY 6 HOURS PRN
Status: DISCONTINUED | OUTPATIENT
Start: 2020-01-01 | End: 2020-01-01 | Stop reason: HOSPADM

## 2020-01-01 RX ORDER — NICOTINE POLACRILEX 4 MG
15 LOZENGE BUCCAL PRN
Status: DISCONTINUED | OUTPATIENT
Start: 2020-01-01 | End: 2020-01-01 | Stop reason: HOSPADM

## 2020-01-01 RX ORDER — OMEGA-3S/DHA/EPA/FISH OIL/D3 300MG-1000
400 CAPSULE ORAL DAILY
Status: DISCONTINUED | OUTPATIENT
Start: 2020-01-01 | End: 2020-01-01 | Stop reason: HOSPADM

## 2020-01-01 RX ORDER — QUETIAPINE FUMARATE 25 MG/1
25 TABLET, FILM COATED ORAL 2 TIMES DAILY
Status: DISCONTINUED | OUTPATIENT
Start: 2020-01-01 | End: 2020-01-01

## 2020-01-01 RX ORDER — SODIUM CHLORIDE 0.9 % (FLUSH) 0.9 %
10 SYRINGE (ML) INJECTION PRN
Status: DISCONTINUED | OUTPATIENT
Start: 2020-01-01 | End: 2020-01-01

## 2020-01-01 RX ORDER — DEXAMETHASONE SODIUM PHOSPHATE 4 MG/ML
6 INJECTION, SOLUTION INTRA-ARTICULAR; INTRALESIONAL; INTRAMUSCULAR; INTRAVENOUS; SOFT TISSUE DAILY
Status: DISCONTINUED | OUTPATIENT
Start: 2020-01-01 | End: 2020-01-01 | Stop reason: SDUPTHER

## 2020-01-01 RX ORDER — MIDAZOLAM HYDROCHLORIDE 2 MG/2ML
4 INJECTION, SOLUTION INTRAMUSCULAR; INTRAVENOUS ONCE
Status: COMPLETED | OUTPATIENT
Start: 2020-01-01 | End: 2020-01-01

## 2020-01-01 RX ORDER — 0.9 % SODIUM CHLORIDE 0.9 %
500 INTRAVENOUS SOLUTION INTRAVENOUS ONCE
Status: COMPLETED | OUTPATIENT
Start: 2020-01-01 | End: 2020-01-01

## 2020-01-01 RX ORDER — LIDOCAINE HYDROCHLORIDE 10 MG/ML
5 INJECTION, SOLUTION EPIDURAL; INFILTRATION; INTRACAUDAL; PERINEURAL ONCE
Status: COMPLETED | OUTPATIENT
Start: 2020-01-01 | End: 2020-01-01

## 2020-01-01 RX ORDER — IPRATROPIUM BROMIDE AND ALBUTEROL SULFATE 2.5; .5 MG/3ML; MG/3ML
3 SOLUTION RESPIRATORY (INHALATION) ONCE
Status: COMPLETED | OUTPATIENT
Start: 2020-01-01 | End: 2020-01-01

## 2020-01-01 RX ORDER — SODIUM CHLORIDE 0.9 % (FLUSH) 0.9 %
10 SYRINGE (ML) INJECTION EVERY 12 HOURS SCHEDULED
Status: DISCONTINUED | OUTPATIENT
Start: 2020-01-01 | End: 2020-01-01 | Stop reason: HOSPADM

## 2020-01-01 RX ORDER — ACETAMINOPHEN 650 MG/1
650 SUPPOSITORY RECTAL EVERY 6 HOURS PRN
Status: DISCONTINUED | OUTPATIENT
Start: 2020-01-01 | End: 2020-01-01 | Stop reason: HOSPADM

## 2020-01-01 RX ORDER — QUETIAPINE FUMARATE 25 MG/1
50 TABLET, FILM COATED ORAL 2 TIMES DAILY
Status: DISCONTINUED | OUTPATIENT
Start: 2020-01-01 | End: 2020-01-01

## 2020-01-01 RX ORDER — OXYCODONE HYDROCHLORIDE 5 MG/1
10 TABLET ORAL EVERY 6 HOURS
Status: DISCONTINUED | OUTPATIENT
Start: 2020-01-01 | End: 2020-01-01

## 2020-01-01 RX ORDER — MIDODRINE HYDROCHLORIDE 5 MG/1
10 TABLET ORAL EVERY 8 HOURS
Status: DISCONTINUED | OUTPATIENT
Start: 2020-01-01 | End: 2020-01-01

## 2020-01-01 RX ORDER — 0.9 % SODIUM CHLORIDE 0.9 %
250 INTRAVENOUS SOLUTION INTRAVENOUS ONCE
Status: COMPLETED | OUTPATIENT
Start: 2020-01-01 | End: 2020-01-01

## 2020-01-01 RX ORDER — DEXAMETHASONE SODIUM PHOSPHATE 4 MG/ML
6 INJECTION, SOLUTION INTRA-ARTICULAR; INTRALESIONAL; INTRAMUSCULAR; INTRAVENOUS; SOFT TISSUE DAILY
Status: DISCONTINUED | OUTPATIENT
Start: 2020-01-01 | End: 2020-01-01

## 2020-01-01 RX ORDER — ETOMIDATE 2 MG/ML
INJECTION INTRAVENOUS
Status: COMPLETED
Start: 2020-01-01 | End: 2020-01-01

## 2020-01-01 RX ORDER — ASCORBIC ACID 500 MG
1000 TABLET ORAL 2 TIMES DAILY
Status: DISCONTINUED | OUTPATIENT
Start: 2020-01-01 | End: 2020-01-01 | Stop reason: HOSPADM

## 2020-01-01 RX ORDER — DIVALPROEX SODIUM 125 MG/1
500 CAPSULE, COATED PELLETS ORAL EVERY 12 HOURS SCHEDULED
Status: DISCONTINUED | OUTPATIENT
Start: 2020-01-01 | End: 2020-01-01

## 2020-01-01 RX ORDER — FENTANYL CITRATE 50 UG/ML
INJECTION, SOLUTION INTRAMUSCULAR; INTRAVENOUS
Status: COMPLETED
Start: 2020-01-01 | End: 2020-01-01

## 2020-01-01 RX ORDER — QUETIAPINE FUMARATE 25 MG/1
25 TABLET, FILM COATED ORAL ONCE
Status: DISCONTINUED | OUTPATIENT
Start: 2020-01-01 | End: 2020-01-01

## 2020-01-01 RX ORDER — PRAVASTATIN SODIUM 80 MG/1
80 TABLET ORAL DAILY
COMMUNITY

## 2020-01-01 RX ORDER — POTASSIUM CHLORIDE 29.8 MG/ML
20 INJECTION INTRAVENOUS PRN
Status: DISCONTINUED | OUTPATIENT
Start: 2020-01-01 | End: 2020-01-01 | Stop reason: HOSPADM

## 2020-01-01 RX ORDER — VECURONIUM BROMIDE 1 MG/ML
10 INJECTION, POWDER, LYOPHILIZED, FOR SOLUTION INTRAVENOUS ONCE
Status: COMPLETED | OUTPATIENT
Start: 2020-01-01 | End: 2020-01-01

## 2020-01-01 RX ORDER — 0.9 % SODIUM CHLORIDE 0.9 %
30 INTRAVENOUS SOLUTION INTRAVENOUS PRN
Status: DISCONTINUED | OUTPATIENT
Start: 2020-01-01 | End: 2020-01-01 | Stop reason: HOSPADM

## 2020-01-01 RX ORDER — MAGNESIUM SULFATE 1 G/100ML
1 INJECTION INTRAVENOUS PRN
Status: DISCONTINUED | OUTPATIENT
Start: 2020-01-01 | End: 2020-01-01 | Stop reason: HOSPADM

## 2020-01-01 RX ORDER — DESMOPRESSIN ACETATE 4 UG/ML
0.5 INJECTION, SOLUTION INTRAVENOUS; SUBCUTANEOUS 2 TIMES DAILY
Status: DISCONTINUED | OUTPATIENT
Start: 2020-01-01 | End: 2020-01-01

## 2020-01-01 RX ORDER — MIDAZOLAM HYDROCHLORIDE 2 MG/2ML
4 INJECTION, SOLUTION INTRAMUSCULAR; INTRAVENOUS ONCE
Status: DISCONTINUED | OUTPATIENT
Start: 2020-01-01 | End: 2020-01-01

## 2020-01-01 RX ORDER — SODIUM CHLORIDE, SODIUM LACTATE, POTASSIUM CHLORIDE, CALCIUM CHLORIDE 600; 310; 30; 20 MG/100ML; MG/100ML; MG/100ML; MG/100ML
INJECTION, SOLUTION INTRAVENOUS CONTINUOUS
Status: DISCONTINUED | OUTPATIENT
Start: 2020-01-01 | End: 2020-01-01

## 2020-01-01 RX ORDER — HEPARIN SODIUM 1000 [USP'U]/ML
2000 INJECTION, SOLUTION INTRAVENOUS; SUBCUTANEOUS PRN
Status: DISCONTINUED | OUTPATIENT
Start: 2020-01-01 | End: 2020-01-01

## 2020-01-01 RX ORDER — FLUCONAZOLE 2 MG/ML
400 INJECTION, SOLUTION INTRAVENOUS EVERY 24 HOURS
Status: DISCONTINUED | OUTPATIENT
Start: 2020-12-01 | End: 2020-01-01

## 2020-01-01 RX ORDER — FUROSEMIDE 10 MG/ML
40 INJECTION INTRAMUSCULAR; INTRAVENOUS 2 TIMES DAILY
Status: DISCONTINUED | OUTPATIENT
Start: 2020-01-01 | End: 2020-01-01

## 2020-01-01 RX ORDER — LEVOFLOXACIN 5 MG/ML
750 INJECTION, SOLUTION INTRAVENOUS EVERY 24 HOURS
Status: COMPLETED | OUTPATIENT
Start: 2020-01-01 | End: 2020-01-01

## 2020-01-01 RX ORDER — VECURONIUM BROMIDE 1 MG/ML
INJECTION, POWDER, LYOPHILIZED, FOR SOLUTION INTRAVENOUS
Status: COMPLETED
Start: 2020-01-01 | End: 2020-01-01

## 2020-01-01 RX ORDER — PROPOFOL 10 MG/ML
INJECTION, EMULSION INTRAVENOUS
Status: COMPLETED
Start: 2020-01-01 | End: 2020-01-01

## 2020-01-01 RX ORDER — MIDAZOLAM HYDROCHLORIDE 1 MG/ML
INJECTION INTRAMUSCULAR; INTRAVENOUS
Status: COMPLETED
Start: 2020-01-01 | End: 2020-01-01

## 2020-01-01 RX ORDER — HEPARIN SODIUM (PORCINE) LOCK FLUSH IV SOLN 100 UNIT/ML 100 UNIT/ML
1 SOLUTION INTRAVENOUS PRN
Status: DISCONTINUED | OUTPATIENT
Start: 2020-01-01 | End: 2020-01-01 | Stop reason: HOSPADM

## 2020-01-01 RX ORDER — INSULIN GLARGINE 100 [IU]/ML
20 INJECTION, SOLUTION SUBCUTANEOUS NIGHTLY
Status: DISCONTINUED | OUTPATIENT
Start: 2020-01-01 | End: 2020-01-01 | Stop reason: HOSPADM

## 2020-01-01 RX ORDER — SODIUM CHLORIDE 0.9 % (FLUSH) 0.9 %
10 SYRINGE (ML) INJECTION PRN
Status: DISCONTINUED | OUTPATIENT
Start: 2020-01-01 | End: 2020-01-01 | Stop reason: HOSPADM

## 2020-01-01 RX ORDER — SUCCINYLCHOLINE CHLORIDE 20 MG/ML
INJECTION INTRAMUSCULAR; INTRAVENOUS
Status: COMPLETED
Start: 2020-01-01 | End: 2020-01-01

## 2020-01-01 RX ORDER — PANTOPRAZOLE SODIUM 40 MG/10ML
40 INJECTION, POWDER, LYOPHILIZED, FOR SOLUTION INTRAVENOUS DAILY
Status: DISCONTINUED | OUTPATIENT
Start: 2020-01-01 | End: 2020-01-01 | Stop reason: HOSPADM

## 2020-01-01 RX ORDER — FLUCONAZOLE 2 MG/ML
400 INJECTION, SOLUTION INTRAVENOUS EVERY 24 HOURS
Status: DISCONTINUED | OUTPATIENT
Start: 2020-01-01 | End: 2020-01-01

## 2020-01-01 RX ORDER — ONDANSETRON 2 MG/ML
4 INJECTION INTRAMUSCULAR; INTRAVENOUS EVERY 6 HOURS PRN
Status: DISCONTINUED | OUTPATIENT
Start: 2020-01-01 | End: 2020-01-01 | Stop reason: HOSPADM

## 2020-01-01 RX ORDER — CHLORHEXIDINE GLUCONATE 0.12 MG/ML
15 RINSE ORAL 2 TIMES DAILY
Status: DISCONTINUED | OUTPATIENT
Start: 2020-01-01 | End: 2020-01-01 | Stop reason: HOSPADM

## 2020-01-01 RX ORDER — ACETYLCYSTEINE 200 MG/ML
600 SOLUTION ORAL; RESPIRATORY (INHALATION) 2 TIMES DAILY
Status: DISCONTINUED | OUTPATIENT
Start: 2020-01-01 | End: 2020-01-01 | Stop reason: DRUGHIGH

## 2020-01-01 RX ORDER — HEPARIN SODIUM (PORCINE) LOCK FLUSH IV SOLN 100 UNIT/ML 100 UNIT/ML
3 SOLUTION INTRAVENOUS EVERY 12 HOURS SCHEDULED
Status: DISCONTINUED | OUTPATIENT
Start: 2020-01-01 | End: 2020-01-01 | Stop reason: HOSPADM

## 2020-01-01 RX ORDER — HEPARIN SODIUM (PORCINE) LOCK FLUSH IV SOLN 100 UNIT/ML 100 UNIT/ML
1 SOLUTION INTRAVENOUS EVERY 12 HOURS SCHEDULED
Status: DISCONTINUED | OUTPATIENT
Start: 2020-01-01 | End: 2020-01-01 | Stop reason: HOSPADM

## 2020-01-01 RX ORDER — POTASSIUM CHLORIDE 29.8 MG/ML
40 INJECTION INTRAVENOUS ONCE
Status: COMPLETED | OUTPATIENT
Start: 2020-01-01 | End: 2020-01-01

## 2020-01-01 RX ORDER — SODIUM CHLORIDE, SODIUM LACTATE, POTASSIUM CHLORIDE, AND CALCIUM CHLORIDE .6; .31; .03; .02 G/100ML; G/100ML; G/100ML; G/100ML
500 INJECTION, SOLUTION INTRAVENOUS ONCE
Status: COMPLETED | OUTPATIENT
Start: 2020-01-01 | End: 2020-01-01

## 2020-01-01 RX ORDER — MIDODRINE HYDROCHLORIDE 5 MG/1
10 TABLET ORAL
Status: DISCONTINUED | OUTPATIENT
Start: 2020-01-01 | End: 2020-01-01

## 2020-01-01 RX ORDER — DEXTROSE MONOHYDRATE 50 MG/ML
100 INJECTION, SOLUTION INTRAVENOUS PRN
Status: DISCONTINUED | OUTPATIENT
Start: 2020-01-01 | End: 2020-01-01 | Stop reason: HOSPADM

## 2020-01-01 RX ORDER — SODIUM CHLORIDE 9 MG/ML
INJECTION, SOLUTION INTRAVENOUS CONTINUOUS
Status: ACTIVE | OUTPATIENT
Start: 2020-01-01 | End: 2020-01-01

## 2020-01-01 RX ORDER — DEXAMETHASONE 4 MG/1
6 TABLET ORAL DAILY
Status: DISCONTINUED | OUTPATIENT
Start: 2020-01-01 | End: 2020-01-01

## 2020-01-01 RX ORDER — MIDODRINE HYDROCHLORIDE 5 MG/1
10 TABLET ORAL EVERY 8 HOURS
Status: DISCONTINUED | OUTPATIENT
Start: 2020-01-01 | End: 2020-01-01 | Stop reason: HOSPADM

## 2020-01-01 RX ORDER — HEPARIN SODIUM 10000 [USP'U]/100ML
9.4 INJECTION, SOLUTION INTRAVENOUS CONTINUOUS
Status: DISCONTINUED | OUTPATIENT
Start: 2020-01-01 | End: 2020-01-01

## 2020-01-01 RX ORDER — HEPARIN SODIUM (PORCINE) LOCK FLUSH IV SOLN 100 UNIT/ML 100 UNIT/ML
3 SOLUTION INTRAVENOUS PRN
Status: DISCONTINUED | OUTPATIENT
Start: 2020-01-01 | End: 2020-01-01 | Stop reason: HOSPADM

## 2020-01-01 RX ORDER — FUROSEMIDE 10 MG/ML
40 INJECTION INTRAMUSCULAR; INTRAVENOUS ONCE
Status: COMPLETED | OUTPATIENT
Start: 2020-01-01 | End: 2020-01-01

## 2020-01-01 RX ORDER — DEXAMETHASONE SODIUM PHOSPHATE 10 MG/ML
6 INJECTION, SOLUTION INTRAMUSCULAR; INTRAVENOUS EVERY 12 HOURS
Status: DISCONTINUED | OUTPATIENT
Start: 2020-01-01 | End: 2020-01-01

## 2020-01-01 RX ORDER — PROPOFOL 10 MG/ML
10 INJECTION, EMULSION INTRAVENOUS
Status: DISCONTINUED | OUTPATIENT
Start: 2020-01-01 | End: 2020-01-01 | Stop reason: HOSPADM

## 2020-01-01 RX ORDER — HEPARIN SODIUM 1000 [USP'U]/ML
4000 INJECTION, SOLUTION INTRAVENOUS; SUBCUTANEOUS PRN
Status: DISCONTINUED | OUTPATIENT
Start: 2020-01-01 | End: 2020-01-01

## 2020-01-01 RX ORDER — INSULIN GLARGINE 100 [IU]/ML
10 INJECTION, SOLUTION SUBCUTANEOUS NIGHTLY
Status: DISCONTINUED | OUTPATIENT
Start: 2020-01-01 | End: 2020-01-01

## 2020-01-01 RX ORDER — POLYETHYLENE GLYCOL 3350 17 G/17G
17 POWDER, FOR SOLUTION ORAL DAILY PRN
Status: DISCONTINUED | OUTPATIENT
Start: 2020-01-01 | End: 2020-01-01 | Stop reason: HOSPADM

## 2020-01-01 RX ORDER — HEPARIN SODIUM 1000 [USP'U]/ML
4000 INJECTION, SOLUTION INTRAVENOUS; SUBCUTANEOUS ONCE
Status: COMPLETED | OUTPATIENT
Start: 2020-01-01 | End: 2020-01-01

## 2020-01-01 RX ORDER — ALBUTEROL SULFATE 2.5 MG/3ML
2.5 SOLUTION RESPIRATORY (INHALATION) EVERY 6 HOURS
Status: DISCONTINUED | OUTPATIENT
Start: 2020-01-01 | End: 2020-01-01 | Stop reason: HOSPADM

## 2020-01-01 RX ORDER — SODIUM CHLORIDE 9 MG/ML
10 INJECTION INTRAVENOUS DAILY
Status: DISCONTINUED | OUTPATIENT
Start: 2020-01-01 | End: 2020-01-01 | Stop reason: HOSPADM

## 2020-01-01 RX ORDER — ACETAMINOPHEN 325 MG/1
650 TABLET ORAL EVERY 6 HOURS PRN
Status: DISCONTINUED | OUTPATIENT
Start: 2020-01-01 | End: 2020-01-01 | Stop reason: HOSPADM

## 2020-01-01 RX ORDER — ZINC SULFATE 50(220)MG
50 CAPSULE ORAL 2 TIMES DAILY
Status: DISCONTINUED | OUTPATIENT
Start: 2020-01-01 | End: 2020-01-01 | Stop reason: HOSPADM

## 2020-01-01 RX ORDER — PROPOFOL 10 MG/ML
10 INJECTION, EMULSION INTRAVENOUS
Status: DISCONTINUED | OUTPATIENT
Start: 2020-01-01 | End: 2020-01-01

## 2020-01-01 RX ORDER — ACETYLCYSTEINE 200 MG/ML
600 SOLUTION ORAL; RESPIRATORY (INHALATION) 2 TIMES DAILY
Status: DISCONTINUED | OUTPATIENT
Start: 2020-01-01 | End: 2020-01-01 | Stop reason: HOSPADM

## 2020-01-01 RX ORDER — QUETIAPINE FUMARATE 25 MG/1
TABLET, FILM COATED ORAL
Status: COMPLETED
Start: 2020-01-01 | End: 2020-01-01

## 2020-01-01 RX ORDER — VECURONIUM BROMIDE 1 MG/ML
INJECTION, POWDER, LYOPHILIZED, FOR SOLUTION INTRAVENOUS
Status: DISCONTINUED
Start: 2020-01-01 | End: 2020-01-01 | Stop reason: WASHOUT

## 2020-01-01 RX ORDER — DEXTROSE MONOHYDRATE 25 G/50ML
12.5 INJECTION, SOLUTION INTRAVENOUS PRN
Status: DISCONTINUED | OUTPATIENT
Start: 2020-01-01 | End: 2020-01-01 | Stop reason: HOSPADM

## 2020-01-01 RX ADMIN — INSULIN LISPRO 9 UNITS: 100 INJECTION, SOLUTION INTRAVENOUS; SUBCUTANEOUS at 18:18

## 2020-01-01 RX ADMIN — MIDODRINE HYDROCHLORIDE 10 MG: 5 TABLET ORAL at 17:08

## 2020-01-01 RX ADMIN — INSULIN LISPRO 1 UNITS: 100 INJECTION, SOLUTION INTRAVENOUS; SUBCUTANEOUS at 00:12

## 2020-01-01 RX ADMIN — ENOXAPARIN SODIUM 110 MG: 120 INJECTION SUBCUTANEOUS at 21:30

## 2020-01-01 RX ADMIN — ZINC SULFATE 220 MG (50 MG) CAPSULE 50 MG: CAPSULE at 20:20

## 2020-01-01 RX ADMIN — SODIUM CHLORIDE, PRESERVATIVE FREE 10 ML: 5 INJECTION INTRAVENOUS at 08:30

## 2020-01-01 RX ADMIN — PROPOFOL 30 MCG/KG/MIN: 10 INJECTION, EMULSION INTRAVENOUS at 04:51

## 2020-01-01 RX ADMIN — OXYCODONE HYDROCHLORIDE 10 MG: 5 TABLET ORAL at 03:47

## 2020-01-01 RX ADMIN — Medication 10 ML: at 21:35

## 2020-01-01 RX ADMIN — PROPOFOL 50 MCG/KG/MIN: 10 INJECTION, EMULSION INTRAVENOUS at 04:29

## 2020-01-01 RX ADMIN — Medication 7 MG/HR: at 01:37

## 2020-01-01 RX ADMIN — SODIUM CHLORIDE 1000 ML: 9 INJECTION, SOLUTION INTRAVENOUS at 16:03

## 2020-01-01 RX ADMIN — FUROSEMIDE 40 MG: 10 INJECTION, SOLUTION INTRAMUSCULAR; INTRAVENOUS at 18:08

## 2020-01-01 RX ADMIN — MIDODRINE HYDROCHLORIDE 10 MG: 5 TABLET ORAL at 12:20

## 2020-01-01 RX ADMIN — OXYCODONE HYDROCHLORIDE 10 MG: 5 TABLET ORAL at 20:31

## 2020-01-01 RX ADMIN — Medication 15 ML: at 20:20

## 2020-01-01 RX ADMIN — SODIUM CHLORIDE, POTASSIUM CHLORIDE, SODIUM LACTATE AND CALCIUM CHLORIDE 500 ML: 600; 310; 30; 20 INJECTION, SOLUTION INTRAVENOUS at 20:12

## 2020-01-01 RX ADMIN — Medication 1000 MG: at 21:40

## 2020-01-01 RX ADMIN — HYDROCORTISONE SODIUM SUCCINATE 100 MG: 100 INJECTION, POWDER, FOR SOLUTION INTRAMUSCULAR; INTRAVENOUS at 11:24

## 2020-01-01 RX ADMIN — SODIUM CHLORIDE, PRESERVATIVE FREE 100 UNITS: 5 INJECTION INTRAVENOUS at 20:10

## 2020-01-01 RX ADMIN — Medication 10 ML: at 08:37

## 2020-01-01 RX ADMIN — SUCCINYLCHOLINE CHLORIDE 200 MG: 20 INJECTION, SOLUTION INTRAMUSCULAR; INTRAVENOUS at 12:34

## 2020-01-01 RX ADMIN — ENOXAPARIN SODIUM 110 MG: 120 INJECTION SUBCUTANEOUS at 08:29

## 2020-01-01 RX ADMIN — Medication 5 MG/HR: at 13:34

## 2020-01-01 RX ADMIN — SODIUM CHLORIDE 2.5 MCG/KG/MIN: 9 INJECTION, SOLUTION INTRAVENOUS at 16:41

## 2020-01-01 RX ADMIN — Medication 1000 MG: at 19:50

## 2020-01-01 RX ADMIN — HYDROCORTISONE SODIUM SUCCINATE 100 MG: 100 INJECTION, POWDER, FOR SOLUTION INTRAMUSCULAR; INTRAVENOUS at 20:32

## 2020-01-01 RX ADMIN — OXYCODONE HYDROCHLORIDE 10 MG: 5 TABLET ORAL at 20:35

## 2020-01-01 RX ADMIN — HYDROCORTISONE SODIUM SUCCINATE 100 MG: 100 INJECTION, POWDER, FOR SOLUTION INTRAMUSCULAR; INTRAVENOUS at 20:18

## 2020-01-01 RX ADMIN — Medication 1000 MG: at 21:17

## 2020-01-01 RX ADMIN — MIDAZOLAM HYDROCHLORIDE 2 MG: 1 INJECTION, SOLUTION INTRAMUSCULAR; INTRAVENOUS at 12:25

## 2020-01-01 RX ADMIN — Medication 15 ML: at 20:18

## 2020-01-01 RX ADMIN — Medication 10 MG/HR: at 04:34

## 2020-01-01 RX ADMIN — ZINC SULFATE 220 MG (50 MG) CAPSULE 50 MG: CAPSULE at 08:37

## 2020-01-01 RX ADMIN — MIDODRINE HYDROCHLORIDE 10 MG: 5 TABLET ORAL at 17:20

## 2020-01-01 RX ADMIN — ETOMIDATE 20 MG: 2 INJECTION INTRAVENOUS at 12:33

## 2020-01-01 RX ADMIN — Medication 10 ML: at 08:43

## 2020-01-01 RX ADMIN — ACETYLCYSTEINE 600 MG: 200 SOLUTION ORAL; RESPIRATORY (INHALATION) at 20:53

## 2020-01-01 RX ADMIN — MIDODRINE HYDROCHLORIDE 10 MG: 5 TABLET ORAL at 11:59

## 2020-01-01 RX ADMIN — ALBUTEROL SULFATE 2.5 MG: 2.5 SOLUTION RESPIRATORY (INHALATION) at 12:25

## 2020-01-01 RX ADMIN — SODIUM CHLORIDE, PRESERVATIVE FREE 10 ML: 5 INJECTION INTRAVENOUS at 08:34

## 2020-01-01 RX ADMIN — INSULIN LISPRO 3 UNITS: 100 INJECTION, SOLUTION INTRAVENOUS; SUBCUTANEOUS at 00:36

## 2020-01-01 RX ADMIN — INSULIN LISPRO 6 UNITS: 100 INJECTION, SOLUTION INTRAVENOUS; SUBCUTANEOUS at 11:37

## 2020-01-01 RX ADMIN — MIDAZOLAM HYDROCHLORIDE 2 MG: 1 INJECTION, SOLUTION INTRAMUSCULAR; INTRAVENOUS at 14:15

## 2020-01-01 RX ADMIN — SODIUM CHLORIDE 3.5 MCG/KG/MIN: 9 INJECTION, SOLUTION INTRAVENOUS at 13:26

## 2020-01-01 RX ADMIN — ALBUTEROL SULFATE 2.5 MG: 2.5 SOLUTION RESPIRATORY (INHALATION) at 03:00

## 2020-01-01 RX ADMIN — QUETIAPINE FUMARATE 25 MG: 25 TABLET ORAL at 08:30

## 2020-01-01 RX ADMIN — Medication 150 MCG/HR: at 05:48

## 2020-01-01 RX ADMIN — QUETIAPINE FUMARATE 25 MG: 25 TABLET ORAL at 20:32

## 2020-01-01 RX ADMIN — DIVALPROEX SODIUM 500 MG: 125 CAPSULE, COATED PELLETS ORAL at 08:28

## 2020-01-01 RX ADMIN — CHOLECALCIFEROL TAB 10 MCG (400 UNIT) 400 UNITS: 10 TAB at 08:28

## 2020-01-01 RX ADMIN — Medication 200 MCG/HR: at 20:22

## 2020-01-01 RX ADMIN — PROPOFOL 25 MCG/KG/MIN: 10 INJECTION, EMULSION INTRAVENOUS at 08:25

## 2020-01-01 RX ADMIN — REMDESIVIR 100 MG: 5 INJECTION INTRAVENOUS at 14:50

## 2020-01-01 RX ADMIN — INSULIN LISPRO 3 UNITS: 100 INJECTION, SOLUTION INTRAVENOUS; SUBCUTANEOUS at 06:45

## 2020-01-01 RX ADMIN — Medication 200 MCG/HR: at 03:40

## 2020-01-01 RX ADMIN — ZINC SULFATE 220 MG (50 MG) CAPSULE 50 MG: CAPSULE at 08:24

## 2020-01-01 RX ADMIN — Medication 8 MG/HR: at 20:24

## 2020-01-01 RX ADMIN — Medication 1000 MG: at 20:06

## 2020-01-01 RX ADMIN — ALBUTEROL SULFATE 2.5 MG: 2.5 SOLUTION RESPIRATORY (INHALATION) at 13:06

## 2020-01-01 RX ADMIN — ALBUTEROL SULFATE 2.5 MG: 2.5 SOLUTION RESPIRATORY (INHALATION) at 00:44

## 2020-01-01 RX ADMIN — ACETYLCYSTEINE 600 MG: 200 SOLUTION ORAL; RESPIRATORY (INHALATION) at 20:11

## 2020-01-01 RX ADMIN — FUROSEMIDE 40 MG: 10 INJECTION, SOLUTION INTRAMUSCULAR; INTRAVENOUS at 08:36

## 2020-01-01 RX ADMIN — INSULIN LISPRO 6 UNITS: 100 INJECTION, SOLUTION INTRAVENOUS; SUBCUTANEOUS at 17:39

## 2020-01-01 RX ADMIN — LEVOFLOXACIN 750 MG: 5 INJECTION, SOLUTION INTRAVENOUS at 16:00

## 2020-01-01 RX ADMIN — CHOLECALCIFEROL TAB 10 MCG (400 UNIT) 400 UNITS: 10 TAB at 08:37

## 2020-01-01 RX ADMIN — MIDODRINE HYDROCHLORIDE 10 MG: 5 TABLET ORAL at 08:59

## 2020-01-01 RX ADMIN — PROPOFOL 40 MCG/KG/MIN: 10 INJECTION, EMULSION INTRAVENOUS at 04:22

## 2020-01-01 RX ADMIN — ALBUTEROL SULFATE 2.5 MG: 2.5 SOLUTION RESPIRATORY (INHALATION) at 20:53

## 2020-01-01 RX ADMIN — Medication 200 MCG/HR: at 07:09

## 2020-01-01 RX ADMIN — Medication 200 MCG/HR: at 23:05

## 2020-01-01 RX ADMIN — Medication 9 MG/HR: at 21:45

## 2020-01-01 RX ADMIN — PROPOFOL 30 MCG/KG/MIN: 10 INJECTION, EMULSION INTRAVENOUS at 04:18

## 2020-01-01 RX ADMIN — FLUCONAZOLE IN SODIUM CHLORIDE 400 MG: 2 INJECTION, SOLUTION INTRAVENOUS at 10:52

## 2020-01-01 RX ADMIN — HYDROCORTISONE SODIUM SUCCINATE 100 MG: 100 INJECTION, POWDER, FOR SOLUTION INTRAMUSCULAR; INTRAVENOUS at 05:02

## 2020-01-01 RX ADMIN — HYDROCORTISONE SODIUM SUCCINATE 100 MG: 100 INJECTION, POWDER, FOR SOLUTION INTRAMUSCULAR; INTRAVENOUS at 12:25

## 2020-01-01 RX ADMIN — MIDODRINE HYDROCHLORIDE 10 MG: 5 TABLET ORAL at 03:00

## 2020-01-01 RX ADMIN — OXYCODONE HYDROCHLORIDE 10 MG: 5 TABLET ORAL at 03:30

## 2020-01-01 RX ADMIN — PROPOFOL 40 MCG/KG/MIN: 10 INJECTION, EMULSION INTRAVENOUS at 00:19

## 2020-01-01 RX ADMIN — ENOXAPARIN SODIUM 110 MG: 120 INJECTION SUBCUTANEOUS at 20:33

## 2020-01-01 RX ADMIN — HEPARIN SODIUM 15.41 UNITS/KG/HR: 10000 INJECTION, SOLUTION INTRAVENOUS at 05:15

## 2020-01-01 RX ADMIN — TOCILIZUMAB 400 MG: 20 INJECTION, SOLUTION, CONCENTRATE INTRAVENOUS at 15:15

## 2020-01-01 RX ADMIN — MIDODRINE HYDROCHLORIDE 10 MG: 5 TABLET ORAL at 08:47

## 2020-01-01 RX ADMIN — INSULIN GLARGINE 10 UNITS: 100 INJECTION, SOLUTION SUBCUTANEOUS at 11:53

## 2020-01-01 RX ADMIN — ENOXAPARIN SODIUM 110 MG: 120 INJECTION SUBCUTANEOUS at 13:47

## 2020-01-01 RX ADMIN — Medication 1000 MG: at 08:59

## 2020-01-01 RX ADMIN — Medication 200 MCG/HR: at 16:02

## 2020-01-01 RX ADMIN — PROPOFOL 40 MCG/KG/MIN: 10 INJECTION, EMULSION INTRAVENOUS at 07:54

## 2020-01-01 RX ADMIN — MIDODRINE HYDROCHLORIDE 10 MG: 5 TABLET ORAL at 08:28

## 2020-01-01 RX ADMIN — ENOXAPARIN SODIUM 110 MG: 120 INJECTION SUBCUTANEOUS at 08:37

## 2020-01-01 RX ADMIN — Medication 200 MCG/HR: at 05:51

## 2020-01-01 RX ADMIN — PROPOFOL 30 MCG/KG/MIN: 10 INJECTION, EMULSION INTRAVENOUS at 12:21

## 2020-01-01 RX ADMIN — SODIUM CHLORIDE 2 MCG/KG/MIN: 9 INJECTION, SOLUTION INTRAVENOUS at 13:41

## 2020-01-01 RX ADMIN — Medication 10 ML: at 08:33

## 2020-01-01 RX ADMIN — Medication 150 MCG/HR: at 01:27

## 2020-01-01 RX ADMIN — DIVALPROEX SODIUM 500 MG: 125 CAPSULE, COATED PELLETS ORAL at 20:19

## 2020-01-01 RX ADMIN — PROPOFOL 40 MCG/KG/MIN: 10 INJECTION, EMULSION INTRAVENOUS at 23:45

## 2020-01-01 RX ADMIN — OXYCODONE HYDROCHLORIDE 10 MG: 5 TABLET ORAL at 15:18

## 2020-01-01 RX ADMIN — ZINC SULFATE 220 MG (50 MG) CAPSULE 50 MG: CAPSULE at 20:06

## 2020-01-01 RX ADMIN — ZINC SULFATE 220 MG (50 MG) CAPSULE 50 MG: CAPSULE at 21:40

## 2020-01-01 RX ADMIN — Medication 200 MCG/HR: at 10:42

## 2020-01-01 RX ADMIN — PROPOFOL 35 MCG/KG/MIN: 10 INJECTION, EMULSION INTRAVENOUS at 13:17

## 2020-01-01 RX ADMIN — INSULIN GLARGINE 20 UNITS: 100 INJECTION, SOLUTION SUBCUTANEOUS at 20:20

## 2020-01-01 RX ADMIN — ZINC SULFATE 220 MG (50 MG) CAPSULE 50 MG: CAPSULE at 08:57

## 2020-01-01 RX ADMIN — INSULIN LISPRO 2 UNITS: 100 INJECTION, SOLUTION INTRAVENOUS; SUBCUTANEOUS at 11:54

## 2020-01-01 RX ADMIN — PROPOFOL 30 MCG/KG/MIN: 10 INJECTION, EMULSION INTRAVENOUS at 23:24

## 2020-01-01 RX ADMIN — Medication 200 MCG/HR: at 04:54

## 2020-01-01 RX ADMIN — VECURONIUM BROMIDE 1 MCG/KG/MIN: 1 INJECTION, POWDER, LYOPHILIZED, FOR SOLUTION INTRAVENOUS at 00:48

## 2020-01-01 RX ADMIN — INSULIN LISPRO 3 UNITS: 100 INJECTION, SOLUTION INTRAVENOUS; SUBCUTANEOUS at 06:06

## 2020-01-01 RX ADMIN — DEXAMETHASONE 6 MG: 4 TABLET ORAL at 20:00

## 2020-01-01 RX ADMIN — Medication 7 MG/HR: at 06:49

## 2020-01-01 RX ADMIN — Medication 200 MCG/HR: at 17:45

## 2020-01-01 RX ADMIN — INSULIN LISPRO 3 UNITS: 100 INJECTION, SOLUTION INTRAVENOUS; SUBCUTANEOUS at 06:05

## 2020-01-01 RX ADMIN — Medication 8 MG/HR: at 03:05

## 2020-01-01 RX ADMIN — Medication 9 MG/HR: at 09:38

## 2020-01-01 RX ADMIN — INSULIN GLARGINE 20 UNITS: 100 INJECTION, SOLUTION SUBCUTANEOUS at 20:45

## 2020-01-01 RX ADMIN — Medication 10 ML: at 20:28

## 2020-01-01 RX ADMIN — FLUCONAZOLE IN SODIUM CHLORIDE 400 MG: 2 INJECTION, SOLUTION INTRAVENOUS at 11:20

## 2020-01-01 RX ADMIN — HYDROCORTISONE SODIUM SUCCINATE 100 MG: 100 INJECTION, POWDER, FOR SOLUTION INTRAMUSCULAR; INTRAVENOUS at 20:30

## 2020-01-01 RX ADMIN — SODIUM CHLORIDE, PRESERVATIVE FREE 10 ML: 5 INJECTION INTRAVENOUS at 08:26

## 2020-01-01 RX ADMIN — OXYCODONE HYDROCHLORIDE 10 MG: 5 TABLET ORAL at 15:05

## 2020-01-01 RX ADMIN — PANTOPRAZOLE SODIUM 40 MG: 40 INJECTION, POWDER, LYOPHILIZED, FOR SOLUTION INTRAVENOUS at 09:24

## 2020-01-01 RX ADMIN — VANCOMYCIN HYDROCHLORIDE 1000 MG: 1 INJECTION, POWDER, LYOPHILIZED, FOR SOLUTION INTRAVENOUS at 02:17

## 2020-01-01 RX ADMIN — MIDODRINE HYDROCHLORIDE 10 MG: 5 TABLET ORAL at 15:01

## 2020-01-01 RX ADMIN — MIDODRINE HYDROCHLORIDE 10 MG: 5 TABLET ORAL at 17:34

## 2020-01-01 RX ADMIN — FUROSEMIDE 40 MG: 10 INJECTION, SOLUTION INTRAMUSCULAR; INTRAVENOUS at 08:37

## 2020-01-01 RX ADMIN — HEPARIN SODIUM 2000 UNITS: 1000 INJECTION INTRAVENOUS; SUBCUTANEOUS at 19:10

## 2020-01-01 RX ADMIN — NOREPINEPHRINE BITARTRATE 6 MCG/MIN: 1 INJECTION, SOLUTION, CONCENTRATE INTRAVENOUS at 08:56

## 2020-01-01 RX ADMIN — ACETAMINOPHEN 650 MG: 325 TABLET ORAL at 12:02

## 2020-01-01 RX ADMIN — Medication 200 MCG/HR: at 16:20

## 2020-01-01 RX ADMIN — SODIUM CHLORIDE 500 ML: 9 INJECTION, SOLUTION INTRAVENOUS at 11:18

## 2020-01-01 RX ADMIN — Medication 10 ML: at 08:39

## 2020-01-01 RX ADMIN — VANCOMYCIN HYDROCHLORIDE 1000 MG: 1 INJECTION, POWDER, LYOPHILIZED, FOR SOLUTION INTRAVENOUS at 14:20

## 2020-01-01 RX ADMIN — VECURONIUM BROMIDE 1.3 MCG/KG/MIN: 1 INJECTION, POWDER, LYOPHILIZED, FOR SOLUTION INTRAVENOUS at 19:17

## 2020-01-01 RX ADMIN — Medication 10 ML: at 12:31

## 2020-01-01 RX ADMIN — POLYETHYLENE GLYCOL 3350 17 G: 17 POWDER, FOR SOLUTION ORAL at 08:47

## 2020-01-01 RX ADMIN — INSULIN GLARGINE 20 UNITS: 100 INJECTION, SOLUTION SUBCUTANEOUS at 20:13

## 2020-01-01 RX ADMIN — MIDODRINE HYDROCHLORIDE 10 MG: 5 TABLET ORAL at 10:51

## 2020-01-01 RX ADMIN — PROPOFOL 20 MCG/KG/MIN: 10 INJECTION, EMULSION INTRAVENOUS at 21:08

## 2020-01-01 RX ADMIN — ZINC SULFATE 220 MG (50 MG) CAPSULE 50 MG: CAPSULE at 20:09

## 2020-01-01 RX ADMIN — FENTANYL CITRATE 100 MCG: 50 INJECTION, SOLUTION INTRAMUSCULAR; INTRAVENOUS at 12:25

## 2020-01-01 RX ADMIN — LEVOFLOXACIN 750 MG: 5 INJECTION, SOLUTION INTRAVENOUS at 14:58

## 2020-01-01 RX ADMIN — NOREPINEPHRINE BITARTRATE 2 MCG/MIN: 1 INJECTION, SOLUTION, CONCENTRATE INTRAVENOUS at 21:00

## 2020-01-01 RX ADMIN — LIDOCAINE HYDROCHLORIDE 0.5 ML: 10 INJECTION, SOLUTION EPIDURAL; INFILTRATION; INTRACAUDAL; PERINEURAL at 17:07

## 2020-01-01 RX ADMIN — Medication 200 MCG/HR: at 04:09

## 2020-01-01 RX ADMIN — INSULIN LISPRO 6 UNITS: 100 INJECTION, SOLUTION INTRAVENOUS; SUBCUTANEOUS at 17:37

## 2020-01-01 RX ADMIN — Medication 8 MG/HR: at 01:55

## 2020-01-01 RX ADMIN — QUETIAPINE FUMARATE 25 MG: 25 TABLET ORAL at 08:29

## 2020-01-01 RX ADMIN — PANTOPRAZOLE SODIUM 40 MG: 40 INJECTION, POWDER, LYOPHILIZED, FOR SOLUTION INTRAVENOUS at 08:47

## 2020-01-01 RX ADMIN — DEXAMETHASONE 6 MG: 4 TABLET ORAL at 07:34

## 2020-01-01 RX ADMIN — DIVALPROEX SODIUM 500 MG: 125 CAPSULE, COATED PELLETS ORAL at 20:26

## 2020-01-01 RX ADMIN — SODIUM CHLORIDE 3 MCG/KG/MIN: 9 INJECTION, SOLUTION INTRAVENOUS at 07:51

## 2020-01-01 RX ADMIN — PROPOFOL 50 MCG/KG/MIN: 10 INJECTION, EMULSION INTRAVENOUS at 06:33

## 2020-01-01 RX ADMIN — PROPOFOL 50 MCG/KG/MIN: 10 INJECTION, EMULSION INTRAVENOUS at 15:20

## 2020-01-01 RX ADMIN — ENOXAPARIN SODIUM 110 MG: 120 INJECTION SUBCUTANEOUS at 08:43

## 2020-01-01 RX ADMIN — DEXAMETHASONE 6 MG: 4 TABLET ORAL at 08:47

## 2020-01-01 RX ADMIN — HEPARIN SODIUM 11.4 UNITS/KG/HR: 10000 INJECTION, SOLUTION INTRAVENOUS at 08:49

## 2020-01-01 RX ADMIN — SODIUM CHLORIDE, PRESERVATIVE FREE 300 UNITS: 5 INJECTION INTRAVENOUS at 20:35

## 2020-01-01 RX ADMIN — Medication 15 ML: at 08:30

## 2020-01-01 RX ADMIN — Medication 1000 MG: at 21:35

## 2020-01-01 RX ADMIN — SODIUM CHLORIDE, PRESERVATIVE FREE 10 ML: 5 INJECTION INTRAVENOUS at 08:52

## 2020-01-01 RX ADMIN — MIDODRINE HYDROCHLORIDE 10 MG: 5 TABLET ORAL at 08:54

## 2020-01-01 RX ADMIN — Medication 15 ML: at 20:35

## 2020-01-01 RX ADMIN — ZINC SULFATE 220 MG (50 MG) CAPSULE 50 MG: CAPSULE at 08:28

## 2020-01-01 RX ADMIN — INSULIN LISPRO 6 UNITS: 100 INJECTION, SOLUTION INTRAVENOUS; SUBCUTANEOUS at 12:00

## 2020-01-01 RX ADMIN — MIDAZOLAM HYDROCHLORIDE 4 MG: 1 INJECTION, SOLUTION INTRAMUSCULAR; INTRAVENOUS at 11:25

## 2020-01-01 RX ADMIN — Medication 10 MG/HR: at 16:30

## 2020-01-01 RX ADMIN — ZINC SULFATE 220 MG (50 MG) CAPSULE 50 MG: CAPSULE at 20:32

## 2020-01-01 RX ADMIN — Medication 8 MG/HR: at 10:06

## 2020-01-01 RX ADMIN — INSULIN LISPRO 3 UNITS: 100 INJECTION, SOLUTION INTRAVENOUS; SUBCUTANEOUS at 00:45

## 2020-01-01 RX ADMIN — Medication 1000 MG: at 08:55

## 2020-01-01 RX ADMIN — NOREPINEPHRINE BITARTRATE 15 MCG/MIN: 1 INJECTION, SOLUTION, CONCENTRATE INTRAVENOUS at 06:55

## 2020-01-01 RX ADMIN — Medication 200 MCG/HR: at 06:50

## 2020-01-01 RX ADMIN — MIDODRINE HYDROCHLORIDE 10 MG: 5 TABLET ORAL at 12:25

## 2020-01-01 RX ADMIN — MIDAZOLAM HYDROCHLORIDE 4 MG: 2 INJECTION, SOLUTION INTRAMUSCULAR; INTRAVENOUS at 11:57

## 2020-01-01 RX ADMIN — PROPOFOL 30 MCG/KG/MIN: 10 INJECTION, EMULSION INTRAVENOUS at 01:05

## 2020-01-01 RX ADMIN — Medication 1000 MG: at 08:39

## 2020-01-01 RX ADMIN — ZINC SULFATE 220 MG (50 MG) CAPSULE 50 MG: CAPSULE at 15:54

## 2020-01-01 RX ADMIN — Medication 200 MCG/HR: at 17:42

## 2020-01-01 RX ADMIN — Medication 1000 MG: at 08:20

## 2020-01-01 RX ADMIN — WATER 2 ML: 1 INJECTION INTRAMUSCULAR; INTRAVENOUS; SUBCUTANEOUS at 11:52

## 2020-01-01 RX ADMIN — Medication 200 MCG/HR: at 07:47

## 2020-01-01 RX ADMIN — DEXAMETHASONE 6 MG: 4 TABLET ORAL at 08:42

## 2020-01-01 RX ADMIN — VANCOMYCIN HYDROCHLORIDE 1000 MG: 1 INJECTION, POWDER, LYOPHILIZED, FOR SOLUTION INTRAVENOUS at 14:30

## 2020-01-01 RX ADMIN — OXYCODONE HYDROCHLORIDE 10 MG: 5 TABLET ORAL at 03:05

## 2020-01-01 RX ADMIN — PANTOPRAZOLE SODIUM 40 MG: 40 INJECTION, POWDER, LYOPHILIZED, FOR SOLUTION INTRAVENOUS at 08:39

## 2020-01-01 RX ADMIN — VANCOMYCIN HYDROCHLORIDE 1000 MG: 1 INJECTION, POWDER, LYOPHILIZED, FOR SOLUTION INTRAVENOUS at 02:00

## 2020-01-01 RX ADMIN — Medication 10 MG/HR: at 12:58

## 2020-01-01 RX ADMIN — OXYCODONE HYDROCHLORIDE 10 MG: 5 TABLET ORAL at 14:41

## 2020-01-01 RX ADMIN — HYDROCORTISONE SODIUM SUCCINATE 100 MG: 100 INJECTION, POWDER, FOR SOLUTION INTRAMUSCULAR; INTRAVENOUS at 04:28

## 2020-01-01 RX ADMIN — Medication 200 MCG/HR: at 02:53

## 2020-01-01 RX ADMIN — Medication 1000 MG: at 20:24

## 2020-01-01 RX ADMIN — ENOXAPARIN SODIUM 110 MG: 120 INJECTION SUBCUTANEOUS at 20:53

## 2020-01-01 RX ADMIN — MIDODRINE HYDROCHLORIDE 10 MG: 5 TABLET ORAL at 17:26

## 2020-01-01 RX ADMIN — ALBUTEROL SULFATE 2.5 MG: 2.5 SOLUTION RESPIRATORY (INHALATION) at 07:53

## 2020-01-01 RX ADMIN — Medication 10 MG/HR: at 06:11

## 2020-01-01 RX ADMIN — LEVOFLOXACIN 750 MG: 5 INJECTION, SOLUTION INTRAVENOUS at 15:09

## 2020-01-01 RX ADMIN — CALCIUM GLUCONATE 1 G: 98 INJECTION, SOLUTION INTRAVENOUS at 10:02

## 2020-01-01 RX ADMIN — ZINC SULFATE 220 MG (50 MG) CAPSULE 50 MG: CAPSULE at 20:30

## 2020-01-01 RX ADMIN — Medication 10 ML: at 08:46

## 2020-01-01 RX ADMIN — ENOXAPARIN SODIUM 110 MG: 120 INJECTION SUBCUTANEOUS at 08:27

## 2020-01-01 RX ADMIN — CHOLECALCIFEROL TAB 10 MCG (400 UNIT) 400 UNITS: 10 TAB at 07:34

## 2020-01-01 RX ADMIN — VANCOMYCIN HYDROCHLORIDE 1000 MG: 1 INJECTION, POWDER, LYOPHILIZED, FOR SOLUTION INTRAVENOUS at 02:22

## 2020-01-01 RX ADMIN — DEXAMETHASONE 6 MG: 4 TABLET ORAL at 21:40

## 2020-01-01 RX ADMIN — Medication 175 MCG/HR: at 21:51

## 2020-01-01 RX ADMIN — MIDODRINE HYDROCHLORIDE 10 MG: 5 TABLET ORAL at 13:01

## 2020-01-01 RX ADMIN — NOREPINEPHRINE BITARTRATE 8 MCG/MIN: 1 INJECTION, SOLUTION, CONCENTRATE INTRAVENOUS at 07:22

## 2020-01-01 RX ADMIN — Medication 200 MCG/HR: at 13:54

## 2020-01-01 RX ADMIN — MIDODRINE HYDROCHLORIDE 10 MG: 5 TABLET ORAL at 12:17

## 2020-01-01 RX ADMIN — PROPOFOL 20 MCG/KG/MIN: 10 INJECTION, EMULSION INTRAVENOUS at 08:23

## 2020-01-01 RX ADMIN — INSULIN LISPRO 3 UNITS: 100 INJECTION, SOLUTION INTRAVENOUS; SUBCUTANEOUS at 12:08

## 2020-01-01 RX ADMIN — Medication 200 MCG/HR: at 04:40

## 2020-01-01 RX ADMIN — ZINC SULFATE 220 MG (50 MG) CAPSULE 50 MG: CAPSULE at 08:30

## 2020-01-01 RX ADMIN — PROPOFOL 50 MCG/KG/MIN: 10 INJECTION, EMULSION INTRAVENOUS at 05:56

## 2020-01-01 RX ADMIN — Medication 1000 MG: at 07:32

## 2020-01-01 RX ADMIN — OXYCODONE HYDROCHLORIDE 10 MG: 5 TABLET ORAL at 20:09

## 2020-01-01 RX ADMIN — PROPOFOL 30 MCG/KG/MIN: 10 INJECTION, EMULSION INTRAVENOUS at 18:21

## 2020-01-01 RX ADMIN — PROPOFOL 30 MCG/KG/MIN: 10 INJECTION, EMULSION INTRAVENOUS at 21:39

## 2020-01-01 RX ADMIN — Medication 15 ML: at 08:52

## 2020-01-01 RX ADMIN — Medication 200 MCG/HR: at 22:40

## 2020-01-01 RX ADMIN — Medication 200 MCG/HR: at 13:00

## 2020-01-01 RX ADMIN — VANCOMYCIN HYDROCHLORIDE 1000 MG: 1 INJECTION, POWDER, LYOPHILIZED, FOR SOLUTION INTRAVENOUS at 01:55

## 2020-01-01 RX ADMIN — ACETYLCYSTEINE 600 MG: 200 SOLUTION ORAL; RESPIRATORY (INHALATION) at 12:37

## 2020-01-01 RX ADMIN — Medication 15 ML: at 20:45

## 2020-01-01 RX ADMIN — PROPOFOL 40 MCG/KG/MIN: 10 INJECTION, EMULSION INTRAVENOUS at 13:31

## 2020-01-01 RX ADMIN — VECURONIUM BROMIDE 10 MG: 1 INJECTION, POWDER, LYOPHILIZED, FOR SOLUTION INTRAVENOUS at 11:58

## 2020-01-01 RX ADMIN — MIDODRINE HYDROCHLORIDE 10 MG: 5 TABLET ORAL at 16:11

## 2020-01-01 RX ADMIN — Medication 1000 MG: at 08:30

## 2020-01-01 RX ADMIN — REMDESIVIR 200 MG: 5 INJECTION INTRAVENOUS at 18:10

## 2020-01-01 RX ADMIN — QUETIAPINE FUMARATE 25 MG: 25 TABLET ORAL at 11:58

## 2020-01-01 RX ADMIN — ZINC SULFATE 220 MG (50 MG) CAPSULE 50 MG: CAPSULE at 20:18

## 2020-01-01 RX ADMIN — Medication 10 ML: at 21:52

## 2020-01-01 RX ADMIN — OXYCODONE HYDROCHLORIDE 10 MG: 5 TABLET ORAL at 08:59

## 2020-01-01 RX ADMIN — DORNASE ALFA 2.5 MG: 1 SOLUTION RESPIRATORY (INHALATION) at 09:57

## 2020-01-01 RX ADMIN — SODIUM CHLORIDE 1000 ML: 9 INJECTION, SOLUTION INTRAVENOUS at 23:20

## 2020-01-01 RX ADMIN — SODIUM CHLORIDE, PRESERVATIVE FREE 10 ML: 5 INJECTION INTRAVENOUS at 08:59

## 2020-01-01 RX ADMIN — Medication 200 MCG/HR: at 15:45

## 2020-01-01 RX ADMIN — Medication 200 MCG/HR: at 18:53

## 2020-01-01 RX ADMIN — Medication 10 MG/HR: at 10:43

## 2020-01-01 RX ADMIN — INSULIN LISPRO 4 UNITS: 100 INJECTION, SOLUTION INTRAVENOUS; SUBCUTANEOUS at 00:00

## 2020-01-01 RX ADMIN — Medication 200 MCG/HR: at 00:22

## 2020-01-01 RX ADMIN — Medication 15 ML: at 08:37

## 2020-01-01 RX ADMIN — Medication 200 MCG/HR: at 10:05

## 2020-01-01 RX ADMIN — MIDODRINE HYDROCHLORIDE 10 MG: 5 TABLET ORAL at 12:27

## 2020-01-01 RX ADMIN — Medication 200 MCG/HR: at 14:45

## 2020-01-01 RX ADMIN — Medication 200 MCG/HR: at 08:39

## 2020-01-01 RX ADMIN — Medication 10 ML: at 09:24

## 2020-01-01 RX ADMIN — Medication 200 MCG/HR: at 16:16

## 2020-01-01 RX ADMIN — Medication 10 ML: at 20:30

## 2020-01-01 RX ADMIN — OXYCODONE HYDROCHLORIDE 10 MG: 5 TABLET ORAL at 15:40

## 2020-01-01 RX ADMIN — INSULIN LISPRO 3 UNITS: 100 INJECTION, SOLUTION INTRAVENOUS; SUBCUTANEOUS at 18:19

## 2020-01-01 RX ADMIN — ENOXAPARIN SODIUM 110 MG: 120 INJECTION SUBCUTANEOUS at 08:48

## 2020-01-01 RX ADMIN — Medication 7 MG/HR: at 16:06

## 2020-01-01 RX ADMIN — Medication 200 MCG/HR: at 01:42

## 2020-01-01 RX ADMIN — Medication 200 MCG/HR: at 20:23

## 2020-01-01 RX ADMIN — HYDROCORTISONE SODIUM SUCCINATE 50 MG: 100 INJECTION, POWDER, FOR SOLUTION INTRAMUSCULAR; INTRAVENOUS at 11:51

## 2020-01-01 RX ADMIN — INSULIN LISPRO 3 UNITS: 100 INJECTION, SOLUTION INTRAVENOUS; SUBCUTANEOUS at 07:00

## 2020-01-01 RX ADMIN — SODIUM CHLORIDE, PRESERVATIVE FREE 300 UNITS: 5 INJECTION INTRAVENOUS at 20:31

## 2020-01-01 RX ADMIN — PANTOPRAZOLE SODIUM 40 MG: 40 INJECTION, POWDER, LYOPHILIZED, FOR SOLUTION INTRAVENOUS at 08:36

## 2020-01-01 RX ADMIN — PHENYLEPHRINE HYDROCHLORIDE 75 MCG/MIN: 10 INJECTION INTRAVENOUS at 00:32

## 2020-01-01 RX ADMIN — Medication 1000 MG: at 08:35

## 2020-01-01 RX ADMIN — ALBUTEROL SULFATE 2.5 MG: 2.5 SOLUTION RESPIRATORY (INHALATION) at 01:05

## 2020-01-01 RX ADMIN — SODIUM CHLORIDE, PRESERVATIVE FREE 10 ML: 5 INJECTION INTRAVENOUS at 08:55

## 2020-01-01 RX ADMIN — ACETYLCYSTEINE 600 MG: 200 SOLUTION ORAL; RESPIRATORY (INHALATION) at 13:06

## 2020-01-01 RX ADMIN — Medication 1000 MG: at 20:25

## 2020-01-01 RX ADMIN — DEXAMETHASONE SODIUM PHOSPHATE 6 MG: 10 INJECTION, SOLUTION INTRAMUSCULAR; INTRAVENOUS at 23:21

## 2020-01-01 RX ADMIN — CHOLECALCIFEROL TAB 10 MCG (400 UNIT) 400 UNITS: 10 TAB at 09:25

## 2020-01-01 RX ADMIN — Medication 200 MCG/HR: at 08:08

## 2020-01-01 RX ADMIN — Medication 200 MCG/HR: at 06:27

## 2020-01-01 RX ADMIN — INSULIN LISPRO 3 UNITS: 100 INJECTION, SOLUTION INTRAVENOUS; SUBCUTANEOUS at 18:34

## 2020-01-01 RX ADMIN — PROPOFOL 25 MG/HR: 10 INJECTION, EMULSION INTRAVENOUS at 15:00

## 2020-01-01 RX ADMIN — INSULIN LISPRO 2 UNITS: 100 INJECTION, SOLUTION INTRAVENOUS; SUBCUTANEOUS at 17:00

## 2020-01-01 RX ADMIN — Medication 10 ML: at 08:26

## 2020-01-01 RX ADMIN — DORNASE ALFA 2.5 MG: 1 SOLUTION RESPIRATORY (INHALATION) at 08:55

## 2020-01-01 RX ADMIN — QUETIAPINE FUMARATE 25 MG: 25 TABLET ORAL at 08:47

## 2020-01-01 RX ADMIN — OXYCODONE HYDROCHLORIDE 10 MG: 5 TABLET ORAL at 03:01

## 2020-01-01 RX ADMIN — CHOLECALCIFEROL TAB 10 MCG (400 UNIT) 400 UNITS: 10 TAB at 08:44

## 2020-01-01 RX ADMIN — OXYCODONE HYDROCHLORIDE 10 MG: 5 TABLET ORAL at 02:41

## 2020-01-01 RX ADMIN — Medication 200 MCG/HR: at 18:50

## 2020-01-01 RX ADMIN — LEVOFLOXACIN 750 MG: 5 INJECTION, SOLUTION INTRAVENOUS at 15:00

## 2020-01-01 RX ADMIN — Medication 1000 MG: at 08:47

## 2020-01-01 RX ADMIN — Medication 1000 MG: at 20:18

## 2020-01-01 RX ADMIN — Medication 200 MCG/HR: at 08:01

## 2020-01-01 RX ADMIN — ALBUTEROL SULFATE 2.5 MG: 2.5 SOLUTION RESPIRATORY (INHALATION) at 08:55

## 2020-01-01 RX ADMIN — QUETIAPINE FUMARATE 25 MG: 25 TABLET ORAL at 20:35

## 2020-01-01 RX ADMIN — HEPARIN SODIUM 2000 UNITS: 1000 INJECTION INTRAVENOUS; SUBCUTANEOUS at 09:32

## 2020-01-01 RX ADMIN — ENOXAPARIN SODIUM 110 MG: 120 INJECTION SUBCUTANEOUS at 20:30

## 2020-01-01 RX ADMIN — Medication 8 MG/HR: at 12:21

## 2020-01-01 RX ADMIN — ALBUTEROL SULFATE 2.5 MG: 2.5 SOLUTION RESPIRATORY (INHALATION) at 20:10

## 2020-01-01 RX ADMIN — SODIUM CHLORIDE 2 MCG/KG/MIN: 9 INJECTION, SOLUTION INTRAVENOUS at 06:39

## 2020-01-01 RX ADMIN — ALBUTEROL SULFATE 2.5 MG: 2.5 SOLUTION RESPIRATORY (INHALATION) at 01:07

## 2020-01-01 RX ADMIN — DEXAMETHASONE SODIUM PHOSPHATE 6 MG: 10 INJECTION, SOLUTION INTRAMUSCULAR; INTRAVENOUS at 12:25

## 2020-01-01 RX ADMIN — ZINC SULFATE 220 MG (50 MG) CAPSULE 50 MG: CAPSULE at 20:25

## 2020-01-01 RX ADMIN — VECURONIUM BROMIDE 0.5 MCG/KG/MIN: 1 INJECTION, POWDER, LYOPHILIZED, FOR SOLUTION INTRAVENOUS at 14:00

## 2020-01-01 RX ADMIN — Medication 200 MCG/HR: at 20:45

## 2020-01-01 RX ADMIN — ZINC SULFATE 220 MG (50 MG) CAPSULE 50 MG: CAPSULE at 21:35

## 2020-01-01 RX ADMIN — DEXAMETHASONE 6 MG: 4 TABLET ORAL at 20:44

## 2020-01-01 RX ADMIN — Medication 1000 MG: at 23:21

## 2020-01-01 RX ADMIN — DIVALPROEX SODIUM 500 MG: 125 CAPSULE, COATED PELLETS ORAL at 08:23

## 2020-01-01 RX ADMIN — ALBUTEROL SULFATE 2.5 MG: 2.5 SOLUTION RESPIRATORY (INHALATION) at 14:17

## 2020-01-01 RX ADMIN — CHOLECALCIFEROL TAB 10 MCG (400 UNIT) 400 UNITS: 10 TAB at 23:21

## 2020-01-01 RX ADMIN — OXYCODONE HYDROCHLORIDE 10 MG: 5 TABLET ORAL at 08:39

## 2020-01-01 RX ADMIN — Medication 8 MG/HR: at 18:04

## 2020-01-01 RX ADMIN — INSULIN LISPRO 3 UNITS: 100 INJECTION, SOLUTION INTRAVENOUS; SUBCUTANEOUS at 12:27

## 2020-01-01 RX ADMIN — MIDODRINE HYDROCHLORIDE 10 MG: 5 TABLET ORAL at 17:36

## 2020-01-01 RX ADMIN — HYDROCORTISONE SODIUM SUCCINATE 100 MG: 100 INJECTION, POWDER, FOR SOLUTION INTRAMUSCULAR; INTRAVENOUS at 20:08

## 2020-01-01 RX ADMIN — HYDROCORTISONE SODIUM SUCCINATE 100 MG: 100 INJECTION, POWDER, FOR SOLUTION INTRAMUSCULAR; INTRAVENOUS at 20:26

## 2020-01-01 RX ADMIN — MIDODRINE HYDROCHLORIDE 10 MG: 5 TABLET ORAL at 08:30

## 2020-01-01 RX ADMIN — MIDODRINE HYDROCHLORIDE 10 MG: 5 TABLET ORAL at 17:01

## 2020-01-01 RX ADMIN — Medication 8 MG/HR: at 19:00

## 2020-01-01 RX ADMIN — Medication 8 MG/HR: at 07:38

## 2020-01-01 RX ADMIN — ZINC SULFATE 220 MG (50 MG) CAPSULE 50 MG: CAPSULE at 08:59

## 2020-01-01 RX ADMIN — PANTOPRAZOLE SODIUM 40 MG: 40 INJECTION, POWDER, LYOPHILIZED, FOR SOLUTION INTRAVENOUS at 08:54

## 2020-01-01 RX ADMIN — Medication 200 MCG/HR: at 12:48

## 2020-01-01 RX ADMIN — MIDAZOLAM HYDROCHLORIDE 4 MG: 1 INJECTION, SOLUTION INTRAMUSCULAR; INTRAVENOUS at 11:57

## 2020-01-01 RX ADMIN — SODIUM CHLORIDE 250 ML: 9 INJECTION, SOLUTION INTRAVENOUS at 12:42

## 2020-01-01 RX ADMIN — Medication 1000 MG: at 08:42

## 2020-01-01 RX ADMIN — ENOXAPARIN SODIUM 110 MG: 120 INJECTION SUBCUTANEOUS at 21:00

## 2020-01-01 RX ADMIN — FLUCONAZOLE IN SODIUM CHLORIDE 400 MG: 2 INJECTION, SOLUTION INTRAVENOUS at 10:41

## 2020-01-01 RX ADMIN — REMDESIVIR 100 MG: 5 INJECTION INTRAVENOUS at 14:40

## 2020-01-01 RX ADMIN — Medication 8 MG/HR: at 19:46

## 2020-01-01 RX ADMIN — DORNASE ALFA 2.5 MG: 1 SOLUTION RESPIRATORY (INHALATION) at 08:25

## 2020-01-01 RX ADMIN — Medication 200 MCG/HR: at 06:52

## 2020-01-01 RX ADMIN — INSULIN LISPRO 3 UNITS: 100 INJECTION, SOLUTION INTRAVENOUS; SUBCUTANEOUS at 00:04

## 2020-01-01 RX ADMIN — PROPOFOL 20 MCG/KG/MIN: 10 INJECTION, EMULSION INTRAVENOUS at 10:15

## 2020-01-01 RX ADMIN — Medication 200 MCG/HR: at 07:28

## 2020-01-01 RX ADMIN — INSULIN LISPRO 2 UNITS: 100 INJECTION, SOLUTION INTRAVENOUS; SUBCUTANEOUS at 12:17

## 2020-01-01 RX ADMIN — Medication 10 ML: at 08:05

## 2020-01-01 RX ADMIN — HYDROCORTISONE SODIUM SUCCINATE 100 MG: 100 INJECTION, POWDER, FOR SOLUTION INTRAMUSCULAR; INTRAVENOUS at 04:30

## 2020-01-01 RX ADMIN — Medication 175 MCG/HR: at 08:59

## 2020-01-01 RX ADMIN — SODIUM CHLORIDE 2.5 MCG/KG/MIN: 9 INJECTION, SOLUTION INTRAVENOUS at 02:41

## 2020-01-01 RX ADMIN — PROPOFOL 50 MCG/KG/MIN: 10 INJECTION, EMULSION INTRAVENOUS at 02:47

## 2020-01-01 RX ADMIN — QUETIAPINE FUMARATE 25 MG: 25 TABLET ORAL at 08:25

## 2020-01-01 RX ADMIN — PROPOFOL 40 MCG/KG/MIN: 10 INJECTION, EMULSION INTRAVENOUS at 21:45

## 2020-01-01 RX ADMIN — Medication 200 MCG/HR: at 00:34

## 2020-01-01 RX ADMIN — MIDODRINE HYDROCHLORIDE 10 MG: 5 TABLET ORAL at 18:00

## 2020-01-01 RX ADMIN — CHOLECALCIFEROL TAB 10 MCG (400 UNIT) 400 UNITS: 10 TAB at 08:59

## 2020-01-01 RX ADMIN — Medication 15 ML: at 08:27

## 2020-01-01 RX ADMIN — DIVALPROEX SODIUM 500 MG: 125 CAPSULE, COATED PELLETS ORAL at 08:53

## 2020-01-01 RX ADMIN — MIDODRINE HYDROCHLORIDE 10 MG: 5 TABLET ORAL at 22:30

## 2020-01-01 RX ADMIN — QUETIAPINE FUMARATE 25 MG: 25 TABLET ORAL at 09:24

## 2020-01-01 RX ADMIN — VECURONIUM BROMIDE 1 MCG/KG/MIN: 1 INJECTION, POWDER, LYOPHILIZED, FOR SOLUTION INTRAVENOUS at 11:02

## 2020-01-01 RX ADMIN — OXYCODONE HYDROCHLORIDE 10 MG: 5 TABLET ORAL at 03:59

## 2020-01-01 RX ADMIN — ZINC SULFATE 220 MG (50 MG) CAPSULE 50 MG: CAPSULE at 08:56

## 2020-01-01 RX ADMIN — ENOXAPARIN SODIUM 110 MG: 120 INJECTION SUBCUTANEOUS at 20:44

## 2020-01-01 RX ADMIN — ZINC SULFATE 220 MG (50 MG) CAPSULE 50 MG: CAPSULE at 20:35

## 2020-01-01 RX ADMIN — OXYCODONE HYDROCHLORIDE 10 MG: 5 TABLET ORAL at 08:25

## 2020-01-01 RX ADMIN — QUETIAPINE FUMARATE 25 MG: 25 TABLET ORAL at 08:39

## 2020-01-01 RX ADMIN — Medication 200 MCG/HR: at 13:18

## 2020-01-01 RX ADMIN — Medication 15 ML: at 09:22

## 2020-01-01 RX ADMIN — DEXAMETHASONE 6 MG: 4 TABLET ORAL at 08:04

## 2020-01-01 RX ADMIN — OXYCODONE HYDROCHLORIDE 10 MG: 5 TABLET ORAL at 20:18

## 2020-01-01 RX ADMIN — Medication 10 ML: at 20:20

## 2020-01-01 RX ADMIN — ZINC SULFATE 220 MG (50 MG) CAPSULE 50 MG: CAPSULE at 20:24

## 2020-01-01 RX ADMIN — SODIUM CHLORIDE 500 ML: 9 INJECTION, SOLUTION INTRAVENOUS at 10:08

## 2020-01-01 RX ADMIN — CHOLECALCIFEROL TAB 10 MCG (400 UNIT) 400 UNITS: 10 TAB at 12:25

## 2020-01-01 RX ADMIN — REMDESIVIR 100 MG: 5 INJECTION INTRAVENOUS at 13:45

## 2020-01-01 RX ADMIN — DORNASE ALFA 2.5 MG: 1 SOLUTION RESPIRATORY (INHALATION) at 16:12

## 2020-01-01 RX ADMIN — Medication 200 MCG/HR: at 02:55

## 2020-01-01 RX ADMIN — MIDAZOLAM HYDROCHLORIDE 4 MG: 2 INJECTION, SOLUTION INTRAMUSCULAR; INTRAVENOUS at 11:25

## 2020-01-01 RX ADMIN — ALBUTEROL SULFATE 2.5 MG: 2.5 SOLUTION RESPIRATORY (INHALATION) at 09:57

## 2020-01-01 RX ADMIN — SODIUM CHLORIDE, POTASSIUM CHLORIDE, SODIUM LACTATE AND CALCIUM CHLORIDE: 600; 310; 30; 20 INJECTION, SOLUTION INTRAVENOUS at 21:00

## 2020-01-01 RX ADMIN — SODIUM CHLORIDE, PRESERVATIVE FREE 300 UNITS: 5 INJECTION INTRAVENOUS at 20:22

## 2020-01-01 RX ADMIN — PROPOFOL 10 MCG/KG/MIN: 10 INJECTION, EMULSION INTRAVENOUS at 13:30

## 2020-01-01 RX ADMIN — INSULIN LISPRO 3 UNITS: 100 INJECTION, SOLUTION INTRAVENOUS; SUBCUTANEOUS at 11:40

## 2020-01-01 RX ADMIN — INSULIN LISPRO 6 UNITS: 100 INJECTION, SOLUTION INTRAVENOUS; SUBCUTANEOUS at 17:14

## 2020-01-01 RX ADMIN — PHENYLEPHRINE HYDROCHLORIDE 50 MCG/MIN: 10 INJECTION INTRAVENOUS at 20:03

## 2020-01-01 RX ADMIN — QUETIAPINE FUMARATE 25 MG: 25 TABLET ORAL at 08:59

## 2020-01-01 RX ADMIN — ACETYLCYSTEINE 600 MG: 200 SOLUTION ORAL; RESPIRATORY (INHALATION) at 12:25

## 2020-01-01 RX ADMIN — HEPARIN SODIUM 4000 UNITS: 1000 INJECTION INTRAVENOUS; SUBCUTANEOUS at 15:15

## 2020-01-01 RX ADMIN — Medication 200 MCG/HR: at 23:34

## 2020-01-01 RX ADMIN — VANCOMYCIN HYDROCHLORIDE 1000 MG: 1 INJECTION, POWDER, LYOPHILIZED, FOR SOLUTION INTRAVENOUS at 14:48

## 2020-01-01 RX ADMIN — Medication 10 ML: at 21:07

## 2020-01-01 RX ADMIN — ZINC SULFATE 220 MG (50 MG) CAPSULE 50 MG: CAPSULE at 20:44

## 2020-01-01 RX ADMIN — ZINC SULFATE 220 MG (50 MG) CAPSULE 50 MG: CAPSULE at 08:21

## 2020-01-01 RX ADMIN — Medication 200 MCG/HR: at 14:40

## 2020-01-01 RX ADMIN — DIVALPROEX SODIUM 500 MG: 125 CAPSULE, COATED PELLETS ORAL at 13:00

## 2020-01-01 RX ADMIN — PANTOPRAZOLE SODIUM 40 MG: 40 INJECTION, POWDER, LYOPHILIZED, FOR SOLUTION INTRAVENOUS at 08:25

## 2020-01-01 RX ADMIN — INSULIN LISPRO 3 UNITS: 100 INJECTION, SOLUTION INTRAVENOUS; SUBCUTANEOUS at 12:57

## 2020-01-01 RX ADMIN — SODIUM CHLORIDE, PRESERVATIVE FREE 300 UNITS: 5 INJECTION INTRAVENOUS at 21:26

## 2020-01-01 RX ADMIN — PANTOPRAZOLE SODIUM 40 MG: 40 INJECTION, POWDER, LYOPHILIZED, FOR SOLUTION INTRAVENOUS at 08:57

## 2020-01-01 RX ADMIN — INSULIN LISPRO 3 UNITS: 100 INJECTION, SOLUTION INTRAVENOUS; SUBCUTANEOUS at 06:00

## 2020-01-01 RX ADMIN — PROPOFOL 30 MCG/KG/MIN: 10 INJECTION, EMULSION INTRAVENOUS at 07:46

## 2020-01-01 RX ADMIN — Medication 15 ML: at 08:43

## 2020-01-01 RX ADMIN — Medication 200 MCG/HR: at 10:15

## 2020-01-01 RX ADMIN — Medication 2 MG/HR: at 15:58

## 2020-01-01 RX ADMIN — PANTOPRAZOLE SODIUM 40 MG: 40 INJECTION, POWDER, LYOPHILIZED, FOR SOLUTION INTRAVENOUS at 08:42

## 2020-01-01 RX ADMIN — OXYCODONE HYDROCHLORIDE 10 MG: 5 TABLET ORAL at 08:28

## 2020-01-01 RX ADMIN — CHOLECALCIFEROL TAB 10 MCG (400 UNIT) 400 UNITS: 10 TAB at 08:24

## 2020-01-01 RX ADMIN — POTASSIUM PHOSPHATE, MONOBASIC AND POTASSIUM PHOSPHATE, DIBASIC 15 MMOL: 224; 236 INJECTION, SOLUTION, CONCENTRATE INTRAVENOUS at 08:17

## 2020-01-01 RX ADMIN — DIVALPROEX SODIUM 500 MG: 125 CAPSULE, COATED PELLETS ORAL at 08:37

## 2020-01-01 RX ADMIN — MIDAZOLAM HYDROCHLORIDE 4 MG: 1 INJECTION, SOLUTION INTRAMUSCULAR; INTRAVENOUS at 05:49

## 2020-01-01 RX ADMIN — INSULIN GLARGINE 20 UNITS: 100 INJECTION, SOLUTION SUBCUTANEOUS at 20:04

## 2020-01-01 RX ADMIN — POLYETHYLENE GLYCOL 3350 17 G: 17 POWDER, FOR SOLUTION ORAL at 20:08

## 2020-01-01 RX ADMIN — Medication 1000 MG: at 08:57

## 2020-01-01 RX ADMIN — HYDROCORTISONE SODIUM SUCCINATE 100 MG: 100 INJECTION, POWDER, FOR SOLUTION INTRAMUSCULAR; INTRAVENOUS at 04:20

## 2020-01-01 RX ADMIN — PROPOFOL 40 MCG/KG/MIN: 10 INJECTION, EMULSION INTRAVENOUS at 00:04

## 2020-01-01 RX ADMIN — INSULIN GLARGINE 20 UNITS: 100 INJECTION, SOLUTION SUBCUTANEOUS at 20:30

## 2020-01-01 RX ADMIN — LEVOFLOXACIN 750 MG: 5 INJECTION, SOLUTION INTRAVENOUS at 16:44

## 2020-01-01 RX ADMIN — FLUCONAZOLE IN SODIUM CHLORIDE 400 MG: 2 INJECTION, SOLUTION INTRAVENOUS at 10:34

## 2020-01-01 RX ADMIN — CHOLECALCIFEROL TAB 10 MCG (400 UNIT) 400 UNITS: 10 TAB at 09:01

## 2020-01-01 RX ADMIN — Medication 200 MCG/HR: at 11:52

## 2020-01-01 RX ADMIN — DIVALPROEX SODIUM 500 MG: 125 CAPSULE, COATED PELLETS ORAL at 09:22

## 2020-01-01 RX ADMIN — SODIUM CHLORIDE 2.5 MCG/KG/MIN: 9 INJECTION, SOLUTION INTRAVENOUS at 02:05

## 2020-01-01 RX ADMIN — ZINC SULFATE 220 MG (50 MG) CAPSULE 50 MG: CAPSULE at 21:17

## 2020-01-01 RX ADMIN — DIVALPROEX SODIUM 500 MG: 125 CAPSULE, COATED PELLETS ORAL at 20:32

## 2020-01-01 RX ADMIN — SODIUM CHLORIDE 2 MCG/KG/MIN: 9 INJECTION, SOLUTION INTRAVENOUS at 06:05

## 2020-01-01 RX ADMIN — Medication 200 MCG/HR: at 20:18

## 2020-01-01 RX ADMIN — HEPARIN SODIUM 15.4 UNITS/KG/HR: 10000 INJECTION, SOLUTION INTRAVENOUS at 14:30

## 2020-01-01 RX ADMIN — ZINC SULFATE 220 MG (50 MG) CAPSULE 50 MG: CAPSULE at 08:43

## 2020-01-01 RX ADMIN — ACETYLCYSTEINE 600 MG: 200 SOLUTION ORAL; RESPIRATORY (INHALATION) at 12:32

## 2020-01-01 RX ADMIN — ZINC SULFATE 220 MG (50 MG) CAPSULE 50 MG: CAPSULE at 08:39

## 2020-01-01 RX ADMIN — INSULIN LISPRO 3 UNITS: 100 INJECTION, SOLUTION INTRAVENOUS; SUBCUTANEOUS at 00:21

## 2020-01-01 RX ADMIN — POTASSIUM CHLORIDE 40 MEQ: 29.8 INJECTION, SOLUTION INTRAVENOUS at 13:02

## 2020-01-01 RX ADMIN — Medication 1000 MG: at 08:28

## 2020-01-01 RX ADMIN — SODIUM CHLORIDE, PRESERVATIVE FREE 300 UNITS: 5 INJECTION INTRAVENOUS at 20:10

## 2020-01-01 RX ADMIN — Medication 15 ML: at 20:31

## 2020-01-01 RX ADMIN — SODIUM CHLORIDE 1000 ML: 9 INJECTION, SOLUTION INTRAVENOUS at 08:01

## 2020-01-01 RX ADMIN — Medication 200 MCG/HR: at 00:41

## 2020-01-01 RX ADMIN — INSULIN LISPRO 2 UNITS: 100 INJECTION, SOLUTION INTRAVENOUS; SUBCUTANEOUS at 11:20

## 2020-01-01 RX ADMIN — HEPARIN SODIUM 9.4 UNITS/KG/HR: 10000 INJECTION, SOLUTION INTRAVENOUS at 23:20

## 2020-01-01 RX ADMIN — SODIUM CHLORIDE, PRESERVATIVE FREE 10 ML: 5 INJECTION INTRAVENOUS at 08:39

## 2020-01-01 RX ADMIN — Medication 200 MCG/HR: at 19:10

## 2020-01-01 RX ADMIN — OXYCODONE HYDROCHLORIDE 10 MG: 5 TABLET ORAL at 09:24

## 2020-01-01 RX ADMIN — IPRATROPIUM BROMIDE AND ALBUTEROL SULFATE 3 AMPULE: .5; 3 SOLUTION RESPIRATORY (INHALATION) at 20:26

## 2020-01-01 RX ADMIN — PROPOFOL 30 MCG/KG/MIN: 10 INJECTION, EMULSION INTRAVENOUS at 02:52

## 2020-01-01 RX ADMIN — PROPOFOL 40 MCG/KG/MIN: 10 INJECTION, EMULSION INTRAVENOUS at 11:57

## 2020-01-01 RX ADMIN — INSULIN LISPRO 3 UNITS: 100 INJECTION, SOLUTION INTRAVENOUS; SUBCUTANEOUS at 17:39

## 2020-01-01 RX ADMIN — Medication 10 ML: at 08:21

## 2020-01-01 RX ADMIN — FLUCONAZOLE IN SODIUM CHLORIDE 400 MG: 2 INJECTION, SOLUTION INTRAVENOUS at 10:49

## 2020-01-01 RX ADMIN — HEPARIN SODIUM 15.41 UNITS/KG/HR: 10000 INJECTION, SOLUTION INTRAVENOUS at 14:54

## 2020-01-01 RX ADMIN — FLUCONAZOLE IN SODIUM CHLORIDE 400 MG: 2 INJECTION, SOLUTION INTRAVENOUS at 12:22

## 2020-01-01 RX ADMIN — ZINC SULFATE 220 MG (50 MG) CAPSULE 50 MG: CAPSULE at 08:40

## 2020-01-01 RX ADMIN — Medication 100 MCG/HR: at 13:30

## 2020-01-01 RX ADMIN — ACETAMINOPHEN 650 MG: 325 TABLET ORAL at 17:51

## 2020-01-01 RX ADMIN — OXYCODONE HYDROCHLORIDE 10 MG: 5 TABLET ORAL at 20:32

## 2020-01-01 RX ADMIN — Medication 200 MCG/HR: at 08:23

## 2020-01-01 RX ADMIN — OXYCODONE HYDROCHLORIDE 10 MG: 5 TABLET ORAL at 15:30

## 2020-01-01 RX ADMIN — CHOLECALCIFEROL TAB 10 MCG (400 UNIT) 400 UNITS: 10 TAB at 08:39

## 2020-01-01 RX ADMIN — Medication 15 ML: at 20:24

## 2020-01-01 RX ADMIN — Medication 200 MCG/HR: at 22:52

## 2020-01-01 RX ADMIN — MIDODRINE HYDROCHLORIDE 10 MG: 5 TABLET ORAL at 08:39

## 2020-01-01 RX ADMIN — PROPOFOL 30 MCG/KG/MIN: 10 INJECTION, EMULSION INTRAVENOUS at 12:48

## 2020-01-01 RX ADMIN — PROPOFOL 30 MCG/KG/MIN: 10 INJECTION, EMULSION INTRAVENOUS at 14:45

## 2020-01-01 RX ADMIN — Medication 15 ML: at 21:15

## 2020-01-01 RX ADMIN — Medication 200 MCG/HR: at 11:51

## 2020-01-01 RX ADMIN — Medication 1000 MG: at 08:24

## 2020-01-01 RX ADMIN — ACETYLCYSTEINE 600 MG: 200 SOLUTION ORAL; RESPIRATORY (INHALATION) at 08:55

## 2020-01-01 RX ADMIN — Medication 200 MCG/HR: at 06:05

## 2020-01-01 RX ADMIN — Medication 1000 MG: at 20:32

## 2020-01-01 RX ADMIN — INSULIN LISPRO 4 UNITS: 100 INJECTION, SOLUTION INTRAVENOUS; SUBCUTANEOUS at 00:04

## 2020-01-01 RX ADMIN — SODIUM CHLORIDE, PRESERVATIVE FREE 100 UNITS: 5 INJECTION INTRAVENOUS at 20:34

## 2020-01-01 RX ADMIN — CHOLECALCIFEROL TAB 10 MCG (400 UNIT) 400 UNITS: 10 TAB at 08:04

## 2020-01-01 RX ADMIN — ZINC SULFATE 220 MG (50 MG) CAPSULE 50 MG: CAPSULE at 08:47

## 2020-01-01 RX ADMIN — Medication 10 ML: at 20:34

## 2020-01-01 RX ADMIN — DIVALPROEX SODIUM 500 MG: 125 CAPSULE, COATED PELLETS ORAL at 20:35

## 2020-01-01 RX ADMIN — INSULIN LISPRO 6 UNITS: 100 INJECTION, SOLUTION INTRAVENOUS; SUBCUTANEOUS at 06:59

## 2020-01-01 RX ADMIN — DEXAMETHASONE 6 MG: 4 TABLET ORAL at 08:37

## 2020-01-01 RX ADMIN — INSULIN LISPRO 3 UNITS: 100 INJECTION, SOLUTION INTRAVENOUS; SUBCUTANEOUS at 16:57

## 2020-01-01 RX ADMIN — Medication 7 MG/HR: at 15:01

## 2020-01-01 RX ADMIN — ENOXAPARIN SODIUM 110 MG: 120 INJECTION SUBCUTANEOUS at 20:21

## 2020-01-01 RX ADMIN — INSULIN LISPRO 3 UNITS: 100 INJECTION, SOLUTION INTRAVENOUS; SUBCUTANEOUS at 18:20

## 2020-01-01 RX ADMIN — PHENYLEPHRINE HYDROCHLORIDE 100 MCG/MIN: 10 INJECTION INTRAVENOUS at 12:58

## 2020-01-01 RX ADMIN — SODIUM CHLORIDE, PRESERVATIVE FREE 10 ML: 5 INJECTION INTRAVENOUS at 08:31

## 2020-01-01 RX ADMIN — ENOXAPARIN SODIUM 110 MG: 120 INJECTION SUBCUTANEOUS at 08:38

## 2020-01-01 RX ADMIN — ZINC SULFATE 220 MG (50 MG) CAPSULE 50 MG: CAPSULE at 21:07

## 2020-01-01 RX ADMIN — Medication 200 MCG/HR: at 21:52

## 2020-01-01 RX ADMIN — FUROSEMIDE 40 MG: 10 INJECTION, SOLUTION INTRAMUSCULAR; INTRAVENOUS at 17:08

## 2020-01-01 RX ADMIN — DEXAMETHASONE 6 MG: 4 TABLET ORAL at 08:20

## 2020-01-01 RX ADMIN — QUETIAPINE FUMARATE 25 MG: 25 TABLET ORAL at 20:09

## 2020-01-01 RX ADMIN — OXYCODONE HYDROCHLORIDE 10 MG: 5 TABLET ORAL at 08:54

## 2020-01-01 RX ADMIN — SODIUM CHLORIDE, POTASSIUM CHLORIDE, SODIUM LACTATE AND CALCIUM CHLORIDE: 600; 310; 30; 20 INJECTION, SOLUTION INTRAVENOUS at 06:41

## 2020-01-01 RX ADMIN — DEXAMETHASONE 6 MG: 4 TABLET ORAL at 21:35

## 2020-01-01 RX ADMIN — ZINC SULFATE 220 MG (50 MG) CAPSULE 50 MG: CAPSULE at 20:19

## 2020-01-01 RX ADMIN — INSULIN LISPRO 6 UNITS: 100 INJECTION, SOLUTION INTRAVENOUS; SUBCUTANEOUS at 17:53

## 2020-01-01 RX ADMIN — QUETIAPINE FUMARATE 25 MG: 25 TABLET ORAL at 20:18

## 2020-01-01 RX ADMIN — Medication 15 ML: at 08:39

## 2020-01-01 RX ADMIN — Medication 15 ML: at 08:46

## 2020-01-01 RX ADMIN — PANTOPRAZOLE SODIUM 40 MG: 40 INJECTION, POWDER, LYOPHILIZED, FOR SOLUTION INTRAVENOUS at 08:28

## 2020-01-01 RX ADMIN — MIDODRINE HYDROCHLORIDE 10 MG: 5 TABLET ORAL at 08:57

## 2020-01-01 RX ADMIN — ACETYLCYSTEINE 600 MG: 200 SOLUTION ORAL; RESPIRATORY (INHALATION) at 01:08

## 2020-01-01 RX ADMIN — PANTOPRAZOLE SODIUM 40 MG: 40 INJECTION, POWDER, LYOPHILIZED, FOR SOLUTION INTRAVENOUS at 11:53

## 2020-01-01 RX ADMIN — Medication 200 MCG/HR: at 23:28

## 2020-01-01 RX ADMIN — MIDODRINE HYDROCHLORIDE 10 MG: 5 TABLET ORAL at 11:24

## 2020-01-01 RX ADMIN — ALBUTEROL SULFATE 2.5 MG: 2.5 SOLUTION RESPIRATORY (INHALATION) at 20:00

## 2020-01-01 RX ADMIN — INSULIN LISPRO 3 UNITS: 100 INJECTION, SOLUTION INTRAVENOUS; SUBCUTANEOUS at 13:02

## 2020-01-01 RX ADMIN — Medication 15 ML: at 08:25

## 2020-01-01 RX ADMIN — PANTOPRAZOLE SODIUM 40 MG: 40 INJECTION, POWDER, LYOPHILIZED, FOR SOLUTION INTRAVENOUS at 08:52

## 2020-01-01 RX ADMIN — FUROSEMIDE 40 MG: 10 INJECTION, SOLUTION INTRAMUSCULAR; INTRAVENOUS at 10:17

## 2020-01-01 RX ADMIN — OXYCODONE HYDROCHLORIDE 10 MG: 5 TABLET ORAL at 03:00

## 2020-01-01 RX ADMIN — HYDROCORTISONE SODIUM SUCCINATE 100 MG: 100 INJECTION, POWDER, FOR SOLUTION INTRAMUSCULAR; INTRAVENOUS at 04:50

## 2020-01-01 RX ADMIN — FUROSEMIDE 40 MG: 10 INJECTION, SOLUTION INTRAMUSCULAR; INTRAVENOUS at 10:02

## 2020-01-01 RX ADMIN — DEXAMETHASONE 6 MG: 4 TABLET ORAL at 21:08

## 2020-01-01 RX ADMIN — PROPOFOL 40 MCG/KG/MIN: 10 INJECTION, EMULSION INTRAVENOUS at 04:10

## 2020-01-01 RX ADMIN — PROPOFOL 35 MCG/KG/MIN: 10 INJECTION, EMULSION INTRAVENOUS at 05:03

## 2020-01-01 RX ADMIN — PROPOFOL 25 MCG/KG/MIN: 10 INJECTION, EMULSION INTRAVENOUS at 08:00

## 2020-01-01 RX ADMIN — QUETIAPINE FUMARATE 25 MG: 25 TABLET ORAL at 08:55

## 2020-01-01 RX ADMIN — PROPOFOL 50 MCG/KG/MIN: 10 INJECTION, EMULSION INTRAVENOUS at 09:39

## 2020-01-01 RX ADMIN — INSULIN GLARGINE 10 UNITS: 100 INJECTION, SOLUTION SUBCUTANEOUS at 21:26

## 2020-01-01 RX ADMIN — Medication 200 MCG/HR: at 10:36

## 2020-01-01 RX ADMIN — CHOLECALCIFEROL TAB 10 MCG (400 UNIT) 400 UNITS: 10 TAB at 08:20

## 2020-01-01 RX ADMIN — ALBUTEROL SULFATE 2.5 MG: 2.5 SOLUTION RESPIRATORY (INHALATION) at 12:32

## 2020-01-01 RX ADMIN — HYDROCORTISONE SODIUM SUCCINATE 100 MG: 100 INJECTION, POWDER, FOR SOLUTION INTRAMUSCULAR; INTRAVENOUS at 12:06

## 2020-01-01 RX ADMIN — ALBUTEROL SULFATE 2.5 MG: 2.5 SOLUTION RESPIRATORY (INHALATION) at 20:50

## 2020-01-01 RX ADMIN — INSULIN LISPRO 6 UNITS: 100 INJECTION, SOLUTION INTRAVENOUS; SUBCUTANEOUS at 12:08

## 2020-01-01 RX ADMIN — MIDODRINE HYDROCHLORIDE 10 MG: 5 TABLET ORAL at 12:02

## 2020-01-01 RX ADMIN — MIDODRINE HYDROCHLORIDE 10 MG: 5 TABLET ORAL at 17:00

## 2020-01-01 RX ADMIN — VECURONIUM BROMIDE 1 MCG/KG/MIN: 1 INJECTION, POWDER, LYOPHILIZED, FOR SOLUTION INTRAVENOUS at 03:25

## 2020-01-01 RX ADMIN — Medication 1000 MG: at 20:00

## 2020-01-01 RX ADMIN — Medication 15 ML: at 21:35

## 2020-01-01 RX ADMIN — ENOXAPARIN SODIUM 110 MG: 120 INJECTION SUBCUTANEOUS at 08:56

## 2020-01-01 RX ADMIN — Medication 200 MCG/HR: at 12:34

## 2020-01-01 RX ADMIN — PROPOFOL 30 MCG/KG/MIN: 10 INJECTION, EMULSION INTRAVENOUS at 00:58

## 2020-01-01 RX ADMIN — HYDROCORTISONE SODIUM SUCCINATE 100 MG: 100 INJECTION, POWDER, FOR SOLUTION INTRAMUSCULAR; INTRAVENOUS at 12:32

## 2020-01-01 RX ADMIN — ENOXAPARIN SODIUM 110 MG: 120 INJECTION SUBCUTANEOUS at 20:10

## 2020-01-01 RX ADMIN — PROPOFOL 20 MCG/KG/MIN: 10 INJECTION, EMULSION INTRAVENOUS at 17:35

## 2020-01-01 RX ADMIN — ENOXAPARIN SODIUM 110 MG: 120 INJECTION SUBCUTANEOUS at 08:57

## 2020-01-01 RX ADMIN — POLYETHYLENE GLYCOL 3350 17 G: 17 POWDER, FOR SOLUTION ORAL at 20:32

## 2020-01-01 RX ADMIN — SODIUM CHLORIDE 1000 ML: 9 INJECTION, SOLUTION INTRAVENOUS at 18:30

## 2020-01-01 RX ADMIN — PANTOPRAZOLE SODIUM 40 MG: 40 INJECTION, POWDER, LYOPHILIZED, FOR SOLUTION INTRAVENOUS at 08:30

## 2020-01-01 RX ADMIN — MIDODRINE HYDROCHLORIDE 10 MG: 5 TABLET ORAL at 08:38

## 2020-01-01 RX ADMIN — DORNASE ALFA 2.5 MG: 1 SOLUTION RESPIRATORY (INHALATION) at 07:53

## 2020-01-01 RX ADMIN — INSULIN LISPRO 3 UNITS: 100 INJECTION, SOLUTION INTRAVENOUS; SUBCUTANEOUS at 00:09

## 2020-01-01 RX ADMIN — REMDESIVIR 100 MG: 5 INJECTION INTRAVENOUS at 14:30

## 2020-01-01 RX ADMIN — Medication 10 ML: at 08:58

## 2020-01-01 RX ADMIN — Medication 1000 MG: at 08:04

## 2020-01-01 RX ADMIN — Medication 10 MG/HR: at 00:48

## 2020-01-01 RX ADMIN — CHOLECALCIFEROL TAB 10 MCG (400 UNIT) 400 UNITS: 10 TAB at 08:52

## 2020-01-01 RX ADMIN — ENOXAPARIN SODIUM 110 MG: 120 INJECTION SUBCUTANEOUS at 20:35

## 2020-01-01 RX ADMIN — Medication 200 MCG/HR: at 13:32

## 2020-01-01 RX ADMIN — Medication 15 ML: at 20:19

## 2020-01-01 RX ADMIN — INSULIN LISPRO 2 UNITS: 100 INJECTION, SOLUTION INTRAVENOUS; SUBCUTANEOUS at 06:34

## 2020-01-01 RX ADMIN — ZINC SULFATE 220 MG (50 MG) CAPSULE 50 MG: CAPSULE at 07:35

## 2020-01-01 RX ADMIN — Medication 1000 MG: at 09:25

## 2020-01-01 RX ADMIN — Medication 200 MCG/HR: at 01:56

## 2020-01-01 RX ADMIN — Medication 200 MCG/HR: at 04:31

## 2020-01-01 RX ADMIN — Medication 15 ML: at 20:08

## 2020-01-01 RX ADMIN — Medication 10 MG/HR: at 13:15

## 2020-01-01 RX ADMIN — Medication 15 ML: at 08:59

## 2020-01-01 RX ADMIN — Medication 1000 MG: at 20:20

## 2020-01-01 RX ADMIN — MIDODRINE HYDROCHLORIDE 10 MG: 5 TABLET ORAL at 08:36

## 2020-01-01 RX ADMIN — INSULIN LISPRO 3 UNITS: 100 INJECTION, SOLUTION INTRAVENOUS; SUBCUTANEOUS at 18:00

## 2020-01-01 RX ADMIN — Medication 15 ML: at 08:36

## 2020-01-01 RX ADMIN — Medication 10 ML: at 20:25

## 2020-01-01 RX ADMIN — SODIUM CHLORIDE, PRESERVATIVE FREE 10 ML: 5 INJECTION INTRAVENOUS at 08:37

## 2020-01-01 RX ADMIN — ZINC SULFATE 220 MG (50 MG) CAPSULE 50 MG: CAPSULE at 23:21

## 2020-01-01 RX ADMIN — MIDODRINE HYDROCHLORIDE 10 MG: 5 TABLET ORAL at 08:24

## 2020-01-01 RX ADMIN — PROPOFOL 50 MCG/KG/MIN: 10 INJECTION, EMULSION INTRAVENOUS at 12:32

## 2020-01-01 RX ADMIN — INSULIN LISPRO 1 UNITS: 100 INJECTION, SOLUTION INTRAVENOUS; SUBCUTANEOUS at 06:17

## 2020-01-01 RX ADMIN — Medication 15 ML: at 20:04

## 2020-01-01 RX ADMIN — ENOXAPARIN SODIUM 110 MG: 120 INJECTION SUBCUTANEOUS at 20:27

## 2020-01-01 RX ADMIN — PANTOPRAZOLE SODIUM 40 MG: 40 INJECTION, POWDER, LYOPHILIZED, FOR SOLUTION INTRAVENOUS at 08:59

## 2020-01-01 RX ADMIN — Medication 10 ML: at 20:11

## 2020-01-01 RX ADMIN — Medication 200 MCG/HR: at 15:02

## 2020-01-01 RX ADMIN — Medication 10 ML: at 20:45

## 2020-01-01 RX ADMIN — Medication 8 MG/HR: at 23:23

## 2020-01-01 RX ADMIN — Medication 10 ML: at 21:00

## 2020-01-01 RX ADMIN — SODIUM CHLORIDE 1000 ML: 9 INJECTION, SOLUTION INTRAVENOUS at 16:55

## 2020-01-01 RX ADMIN — Medication 200 MCG/HR: at 18:39

## 2020-01-01 RX ADMIN — SODIUM CHLORIDE 2.5 MCG/KG/MIN: 9 INJECTION, SOLUTION INTRAVENOUS at 12:21

## 2020-01-01 RX ADMIN — MIDODRINE HYDROCHLORIDE 10 MG: 5 TABLET ORAL at 06:46

## 2020-01-01 RX ADMIN — SODIUM CHLORIDE 2.5 MCG/KG/MIN: 9 INJECTION, SOLUTION INTRAVENOUS at 16:57

## 2020-01-01 RX ADMIN — SODIUM CHLORIDE, PRESERVATIVE FREE 10 ML: 5 INJECTION INTRAVENOUS at 09:24

## 2020-01-01 RX ADMIN — ENOXAPARIN SODIUM 110 MG: 120 INJECTION SUBCUTANEOUS at 09:00

## 2020-01-01 RX ADMIN — DIVALPROEX SODIUM 500 MG: 125 CAPSULE, COATED PELLETS ORAL at 08:30

## 2020-01-01 RX ADMIN — INSULIN LISPRO 2 UNITS: 100 INJECTION, SOLUTION INTRAVENOUS; SUBCUTANEOUS at 06:45

## 2020-01-01 RX ADMIN — INSULIN LISPRO 3 UNITS: 100 INJECTION, SOLUTION INTRAVENOUS; SUBCUTANEOUS at 00:41

## 2020-01-01 RX ADMIN — Medication 10 ML: at 08:34

## 2020-01-01 RX ADMIN — CHOLECALCIFEROL TAB 10 MCG (400 UNIT) 400 UNITS: 10 TAB at 08:38

## 2020-01-01 RX ADMIN — Medication 1000 MG: at 21:07

## 2020-01-01 RX ADMIN — Medication 1000 MG: at 20:44

## 2020-01-01 RX ADMIN — MIDODRINE HYDROCHLORIDE 10 MG: 5 TABLET ORAL at 15:59

## 2020-01-01 RX ADMIN — ENOXAPARIN SODIUM 110 MG: 120 INJECTION SUBCUTANEOUS at 08:39

## 2020-01-01 RX ADMIN — VECURONIUM BROMIDE 1.3 MCG/KG/MIN: 1 INJECTION, POWDER, LYOPHILIZED, FOR SOLUTION INTRAVENOUS at 08:37

## 2020-01-01 RX ADMIN — ZINC SULFATE 220 MG (50 MG) CAPSULE 50 MG: CAPSULE at 20:00

## 2020-01-01 RX ADMIN — PROPOFOL 10 MCG/KG/MIN: 10 INJECTION, EMULSION INTRAVENOUS at 00:35

## 2020-01-01 RX ADMIN — Medication 200 MCG/HR: at 17:40

## 2020-01-01 RX ADMIN — HEPARIN SODIUM 16.4 UNITS/KG/HR: 10000 INJECTION, SOLUTION INTRAVENOUS at 00:55

## 2020-01-01 RX ADMIN — SODIUM CHLORIDE 2.5 MCG/KG/MIN: 9 INJECTION, SOLUTION INTRAVENOUS at 04:21

## 2020-01-01 RX ADMIN — Medication 15 ML: at 20:30

## 2020-01-01 RX ADMIN — Medication 1000 MG: at 20:10

## 2020-01-01 RX ADMIN — INSULIN LISPRO 3 UNITS: 100 INJECTION, SOLUTION INTRAVENOUS; SUBCUTANEOUS at 06:55

## 2020-01-01 RX ADMIN — PROPOFOL 20 MCG/KG/MIN: 10 INJECTION, EMULSION INTRAVENOUS at 17:45

## 2020-01-01 RX ADMIN — SODIUM CHLORIDE, POTASSIUM CHLORIDE, SODIUM LACTATE AND CALCIUM CHLORIDE: 600; 310; 30; 20 INJECTION, SOLUTION INTRAVENOUS at 16:43

## 2020-01-01 RX ADMIN — INSULIN LISPRO 6 UNITS: 100 INJECTION, SOLUTION INTRAVENOUS; SUBCUTANEOUS at 12:13

## 2020-01-01 RX ADMIN — Medication 15 ML: at 08:05

## 2020-01-01 RX ADMIN — ALBUTEROL SULFATE 2.5 MG: 2.5 SOLUTION RESPIRATORY (INHALATION) at 08:26

## 2020-01-01 RX ADMIN — SODIUM CHLORIDE, PRESERVATIVE FREE 300 UNITS: 5 INJECTION INTRAVENOUS at 20:27

## 2020-01-01 RX ADMIN — Medication 200 MCG/HR: at 14:34

## 2020-01-01 RX ADMIN — Medication 10 ML: at 07:35

## 2020-01-01 RX ADMIN — OXYCODONE HYDROCHLORIDE 10 MG: 5 TABLET ORAL at 02:43

## 2020-01-01 RX ADMIN — ACETYLCYSTEINE 600 MG: 200 SOLUTION ORAL; RESPIRATORY (INHALATION) at 03:00

## 2020-01-01 RX ADMIN — PROPOFOL 50 MCG/KG/MIN: 10 INJECTION, EMULSION INTRAVENOUS at 13:15

## 2020-01-01 RX ADMIN — Medication 200 MCG/HR: at 01:55

## 2020-01-01 RX ADMIN — Medication 200 MCG/HR: at 15:03

## 2020-01-01 RX ADMIN — PHENYLEPHRINE HYDROCHLORIDE 150 MCG/MIN: 10 INJECTION INTRAVENOUS at 06:17

## 2020-01-01 RX ADMIN — HEPARIN SODIUM 2000 UNITS: 1000 INJECTION INTRAVENOUS; SUBCUTANEOUS at 03:25

## 2020-01-01 RX ADMIN — DIVALPROEX SODIUM 500 MG: 125 CAPSULE, COATED PELLETS ORAL at 20:09

## 2020-01-01 RX ADMIN — OXYCODONE HYDROCHLORIDE 10 MG: 5 TABLET ORAL at 20:25

## 2020-01-01 RX ADMIN — Medication 200 MCG/HR: at 07:52

## 2020-01-01 RX ADMIN — Medication 200 MCG/HR: at 13:13

## 2020-01-01 RX ADMIN — ALBUTEROL SULFATE 2.5 MG: 2.5 SOLUTION RESPIRATORY (INHALATION) at 12:37

## 2020-01-01 RX ADMIN — SODIUM CHLORIDE, PRESERVATIVE FREE 10 ML: 5 INJECTION INTRAVENOUS at 08:47

## 2020-01-01 RX ADMIN — SODIUM CHLORIDE, PRESERVATIVE FREE 10 ML: 5 INJECTION INTRAVENOUS at 08:58

## 2020-01-01 RX ADMIN — Medication 200 MCG/HR: at 14:35

## 2020-01-01 RX ADMIN — INSULIN LISPRO 2 UNITS: 100 INJECTION, SOLUTION INTRAVENOUS; SUBCUTANEOUS at 18:39

## 2020-01-01 RX ADMIN — INSULIN GLARGINE 20 UNITS: 100 INJECTION, SOLUTION SUBCUTANEOUS at 20:07

## 2020-01-01 RX ADMIN — ENOXAPARIN SODIUM 110 MG: 120 INJECTION SUBCUTANEOUS at 08:40

## 2020-01-01 RX ADMIN — HEPARIN SODIUM 4000 UNITS: 1000 INJECTION INTRAVENOUS; SUBCUTANEOUS at 23:22

## 2020-01-01 RX ADMIN — MIDODRINE HYDROCHLORIDE 10 MG: 5 TABLET ORAL at 11:54

## 2020-01-01 RX ADMIN — Medication 1000 MG: at 20:30

## 2020-01-01 RX ADMIN — QUETIAPINE FUMARATE 25 MG: 25 TABLET ORAL at 21:29

## 2020-01-01 RX ADMIN — Medication 1000 MG: at 20:19

## 2020-01-01 RX ADMIN — PROPOFOL 20 MCG/KG/MIN: 10 INJECTION, EMULSION INTRAVENOUS at 03:18

## 2020-01-01 RX ADMIN — Medication 10 MG/HR: at 06:55

## 2020-01-01 RX ADMIN — CHOLECALCIFEROL TAB 10 MCG (400 UNIT) 400 UNITS: 10 TAB at 09:30

## 2020-01-01 RX ADMIN — MIDODRINE HYDROCHLORIDE 10 MG: 5 TABLET ORAL at 11:30

## 2020-01-01 RX ADMIN — SODIUM CHLORIDE, PRESERVATIVE FREE 10 ML: 5 INJECTION INTRAVENOUS at 11:54

## 2020-01-01 RX ADMIN — QUETIAPINE FUMARATE 25 MG: 25 TABLET ORAL at 20:25

## 2020-01-01 RX ADMIN — Medication 10 ML: at 20:04

## 2020-01-01 RX ADMIN — DEXAMETHASONE 6 MG: 4 TABLET ORAL at 08:36

## 2020-01-01 RX ADMIN — CHOLECALCIFEROL TAB 10 MCG (400 UNIT) 400 UNITS: 10 TAB at 08:47

## 2020-01-01 RX ADMIN — Medication 10 ML: at 20:31

## 2020-01-01 RX ADMIN — SODIUM CHLORIDE, PRESERVATIVE FREE 300 UNITS: 5 INJECTION INTRAVENOUS at 20:34

## 2020-01-01 RX ADMIN — OXYCODONE HYDROCHLORIDE 10 MG: 5 TABLET ORAL at 15:52

## 2020-01-01 RX ADMIN — LEVOFLOXACIN 750 MG: 5 INJECTION, SOLUTION INTRAVENOUS at 15:55

## 2020-01-01 RX ADMIN — CHOLECALCIFEROL TAB 10 MCG (400 UNIT) 400 UNITS: 10 TAB at 08:30

## 2020-01-01 RX ADMIN — SODIUM CHLORIDE 2 MCG/KG/MIN: 9 INJECTION, SOLUTION INTRAVENOUS at 23:25

## 2020-01-01 RX ADMIN — Medication 1000 MG: at 08:37

## 2020-01-01 RX ADMIN — QUETIAPINE FUMARATE 25 MG: 25 TABLET ORAL at 08:40

## 2020-01-01 RX ADMIN — PROPOFOL 30 MCG/KG/MIN: 10 INJECTION, EMULSION INTRAVENOUS at 17:39

## 2020-01-01 RX ADMIN — HYDROCORTISONE SODIUM SUCCINATE 100 MG: 100 INJECTION, POWDER, FOR SOLUTION INTRAMUSCULAR; INTRAVENOUS at 12:57

## 2020-01-01 RX ADMIN — Medication 10 MG/HR: at 03:12

## 2020-01-01 RX ADMIN — Medication 5 MG/HR: at 16:52

## 2020-01-01 RX ADMIN — Medication 10 ML: at 20:19

## 2020-01-01 RX ADMIN — PROPOFOL 50 MCG/KG/MIN: 10 INJECTION, EMULSION INTRAVENOUS at 14:40

## 2020-01-01 RX ADMIN — DIVALPROEX SODIUM 500 MG: 125 CAPSULE, COATED PELLETS ORAL at 21:05

## 2020-01-01 RX ADMIN — PROPOFOL 35 MCG/KG/MIN: 10 INJECTION, EMULSION INTRAVENOUS at 08:15

## 2020-01-01 RX ADMIN — PHENYLEPHRINE HYDROCHLORIDE 50 MCG/MIN: 10 INJECTION INTRAVENOUS at 00:28

## 2020-01-01 RX ADMIN — DEXAMETHASONE 6 MG: 4 TABLET ORAL at 08:57

## 2020-01-01 RX ADMIN — QUETIAPINE FUMARATE 25 MG: 25 TABLET ORAL at 20:05

## 2020-01-01 RX ADMIN — Medication 200 MCG/HR: at 18:13

## 2020-01-01 RX ADMIN — OXYCODONE HYDROCHLORIDE 10 MG: 5 TABLET ORAL at 15:57

## 2020-01-01 RX ADMIN — INSULIN LISPRO 9 UNITS: 100 INJECTION, SOLUTION INTRAVENOUS; SUBCUTANEOUS at 00:34

## 2020-01-01 RX ADMIN — ZINC SULFATE 220 MG (50 MG) CAPSULE 50 MG: CAPSULE at 09:25

## 2020-01-01 RX ADMIN — LEVOFLOXACIN 750 MG: 5 INJECTION, SOLUTION INTRAVENOUS at 16:26

## 2020-01-01 RX ADMIN — CHOLECALCIFEROL TAB 10 MCG (400 UNIT) 400 UNITS: 10 TAB at 08:56

## 2020-01-01 RX ADMIN — PROPOFOL 50 MCG/KG/MIN: 10 INJECTION, EMULSION INTRAVENOUS at 06:29

## 2020-01-01 RX ADMIN — ENOXAPARIN SODIUM 110 MG: 120 INJECTION SUBCUTANEOUS at 08:36

## 2020-01-01 RX ADMIN — Medication 10 ML: at 09:00

## 2020-01-01 RX ADMIN — Medication 200 MCG/HR: at 00:56

## 2020-01-01 RX ADMIN — PROPOFOL 35 MCG/KG/MIN: 10 INJECTION, EMULSION INTRAVENOUS at 17:46

## 2020-01-01 RX ADMIN — MIDODRINE HYDROCHLORIDE 10 MG: 5 TABLET ORAL at 17:45

## 2020-01-01 RX ADMIN — Medication 15 ML: at 20:25

## 2020-01-01 RX ADMIN — VECURONIUM BROMIDE 1 MCG/KG/MIN: 1 INJECTION, POWDER, LYOPHILIZED, FOR SOLUTION INTRAVENOUS at 17:31

## 2020-01-01 RX ADMIN — Medication 10 ML: at 08:55

## 2020-01-01 RX ADMIN — OXYCODONE HYDROCHLORIDE 10 MG: 5 TABLET ORAL at 08:30

## 2020-01-01 RX ADMIN — PHENYLEPHRINE HYDROCHLORIDE 25 MCG/MIN: 10 INJECTION INTRAVENOUS at 15:24

## 2020-01-01 RX ADMIN — Medication 1000 MG: at 15:54

## 2020-01-01 RX ADMIN — Medication 10 ML: at 21:30

## 2020-01-01 ASSESSMENT — PAIN SCALES - GENERAL
PAINLEVEL_OUTOF10: 0
PAINLEVEL_OUTOF10: 8
PAINLEVEL_OUTOF10: 0
PAINLEVEL_OUTOF10: 0
PAINLEVEL_OUTOF10: 7
PAINLEVEL_OUTOF10: 0
PAINLEVEL_OUTOF10: 4
PAINLEVEL_OUTOF10: 0
PAINLEVEL_OUTOF10: 4
PAINLEVEL_OUTOF10: 0
PAINLEVEL_OUTOF10: 3
PAINLEVEL_OUTOF10: 0
PAINLEVEL_OUTOF10: 4
PAINLEVEL_OUTOF10: 0
PAINLEVEL_OUTOF10: 4
PAINLEVEL_OUTOF10: 4
PAINLEVEL_OUTOF10: 0
PAINLEVEL_OUTOF10: 0
PAINLEVEL_OUTOF10: 8
PAINLEVEL_OUTOF10: 0
PAINLEVEL_OUTOF10: 2
PAINLEVEL_OUTOF10: 4
PAINLEVEL_OUTOF10: 0
PAINLEVEL_OUTOF10: 0
PAINLEVEL_OUTOF10: 4
PAINLEVEL_OUTOF10: 0
PAINLEVEL_OUTOF10: 2
PAINLEVEL_OUTOF10: 0

## 2020-01-01 ASSESSMENT — ENCOUNTER SYMPTOMS
VOMITING: 1
CHEST TIGHTNESS: 0
RHINORRHEA: 1
CONSTIPATION: 0
NAUSEA: 1
WHEEZING: 0
ABDOMINAL PAIN: 1
BLOOD IN STOOL: 0
COUGH: 1
DIARRHEA: 1
SHORTNESS OF BREATH: 1
EYE PAIN: 0

## 2020-01-01 ASSESSMENT — PULMONARY FUNCTION TESTS
PIF_VALUE: 33
PIF_VALUE: 27
PIF_VALUE: 34
PIF_VALUE: 41
PIF_VALUE: 36
PIF_VALUE: 35
PIF_VALUE: 38
PIF_VALUE: 39
PIF_VALUE: 36
PIF_VALUE: 34
PIF_VALUE: 36
PIF_VALUE: 39
PIF_VALUE: 31
PIF_VALUE: 38
PIF_VALUE: 34
PIF_VALUE: 32
PIF_VALUE: 38
PIF_VALUE: 35
PIF_VALUE: 35
PIF_VALUE: 32
PIF_VALUE: 34
PIF_VALUE: 41
PIF_VALUE: 38
PIF_VALUE: 38
PIF_VALUE: 39
PIF_VALUE: 41
PIF_VALUE: 34
PIF_VALUE: 32
PIF_VALUE: 34
PIF_VALUE: 39
PIF_VALUE: 38
PIF_VALUE: 37
PIF_VALUE: 33
PIF_VALUE: 34
PIF_VALUE: 35
PIF_VALUE: 38
PIF_VALUE: 24
PIF_VALUE: 35
PIF_VALUE: 28
PIF_VALUE: 32
PIF_VALUE: 36
PIF_VALUE: 35
PIF_VALUE: 35
PIF_VALUE: 36
PIF_VALUE: 38
PIF_VALUE: 36
PIF_VALUE: 35
PIF_VALUE: 39
PIF_VALUE: 31
PIF_VALUE: 33
PIF_VALUE: 36
PIF_VALUE: 37
PIF_VALUE: 35
PIF_VALUE: 33
PIF_VALUE: 36
PIF_VALUE: 35
PIF_VALUE: 42
PIF_VALUE: 36
PIF_VALUE: 35
PIF_VALUE: 41
PIF_VALUE: 36
PIF_VALUE: 34
PIF_VALUE: 35
PIF_VALUE: 33
PIF_VALUE: 36
PIF_VALUE: 49
PIF_VALUE: 43
PIF_VALUE: 37
PIF_VALUE: 47
PIF_VALUE: 36
PIF_VALUE: 24
PIF_VALUE: 33
PIF_VALUE: 36
PIF_VALUE: 38
PIF_VALUE: 32
PIF_VALUE: 33
PIF_VALUE: 31
PIF_VALUE: 44
PIF_VALUE: 42
PIF_VALUE: 34
PIF_VALUE: 38
PIF_VALUE: 35
PIF_VALUE: 32
PIF_VALUE: 34
PIF_VALUE: 35
PIF_VALUE: 45
PIF_VALUE: 30
PIF_VALUE: 34
PIF_VALUE: 35
PIF_VALUE: 39
PIF_VALUE: 35
PIF_VALUE: 39
PIF_VALUE: 35
PIF_VALUE: 24
PIF_VALUE: 37
PIF_VALUE: 38
PIF_VALUE: 36
PIF_VALUE: 35
PIF_VALUE: 36
PIF_VALUE: 32
PIF_VALUE: 40
PIF_VALUE: 36
PIF_VALUE: 30
PIF_VALUE: 32
PIF_VALUE: 36
PIF_VALUE: 32
PIF_VALUE: 34
PIF_VALUE: 35
PIF_VALUE: 35
PIF_VALUE: 34
PIF_VALUE: 36
PIF_VALUE: 35
PIF_VALUE: 35
PIF_VALUE: 34
PIF_VALUE: 36
PIF_VALUE: 34
PIF_VALUE: 36
PIF_VALUE: 36
PIF_VALUE: 38
PIF_VALUE: 32
PIF_VALUE: 37
PIF_VALUE: 47
PIF_VALUE: 34
PIF_VALUE: 42
PIF_VALUE: 29
PIF_VALUE: 36
PIF_VALUE: 34
PIF_VALUE: 36
PIF_VALUE: 32
PIF_VALUE: 33
PIF_VALUE: 35
PIF_VALUE: 44
PIF_VALUE: 35
PIF_VALUE: 35
PIF_VALUE: 38
PIF_VALUE: 36
PIF_VALUE: 37
PIF_VALUE: 38
PIF_VALUE: 36
PIF_VALUE: 36
PIF_VALUE: 33
PIF_VALUE: 23
PIF_VALUE: 32
PIF_VALUE: 40
PIF_VALUE: 32
PIF_VALUE: 42
PIF_VALUE: 35
PIF_VALUE: 26
PIF_VALUE: 35
PIF_VALUE: 37
PIF_VALUE: 55
PIF_VALUE: 36
PIF_VALUE: 38
PIF_VALUE: 32
PIF_VALUE: 44
PIF_VALUE: 29
PIF_VALUE: 37
PIF_VALUE: 35
PIF_VALUE: 36
PIF_VALUE: 37
PIF_VALUE: 38
PIF_VALUE: 22
PIF_VALUE: 45
PIF_VALUE: 20
PIF_VALUE: 36
PIF_VALUE: 42
PIF_VALUE: 34
PIF_VALUE: 32
PIF_VALUE: 49
PIF_VALUE: 23
PIF_VALUE: 37
PIF_VALUE: 45
PIF_VALUE: 44
PIF_VALUE: 35
PIF_VALUE: 37
PIF_VALUE: 29
PIF_VALUE: 34
PIF_VALUE: 36
PIF_VALUE: 32
PIF_VALUE: 40
PIF_VALUE: 36
PIF_VALUE: 40
PIF_VALUE: 37
PIF_VALUE: 31
PIF_VALUE: 36
PIF_VALUE: 34
PIF_VALUE: 28
PIF_VALUE: 38
PIF_VALUE: 36
PIF_VALUE: 35
PIF_VALUE: 33
PIF_VALUE: 37
PIF_VALUE: 33
PIF_VALUE: 37
PIF_VALUE: 32
PIF_VALUE: 31
PIF_VALUE: 37
PIF_VALUE: 29
PIF_VALUE: 35
PIF_VALUE: 34
PIF_VALUE: 34
PIF_VALUE: 37
PIF_VALUE: 34
PIF_VALUE: 38
PIF_VALUE: 36
PIF_VALUE: 40
PIF_VALUE: 32
PIF_VALUE: 38
PIF_VALUE: 38
PIF_VALUE: 39
PIF_VALUE: 32
PIF_VALUE: 35
PIF_VALUE: 38
PIF_VALUE: 34
PIF_VALUE: 27
PIF_VALUE: 37
PIF_VALUE: 40
PIF_VALUE: 40
PIF_VALUE: 36
PIF_VALUE: 34
PIF_VALUE: 30
PIF_VALUE: 36
PIF_VALUE: 38
PIF_VALUE: 36
PIF_VALUE: 33
PIF_VALUE: 37
PIF_VALUE: 32
PIF_VALUE: 36
PIF_VALUE: 31
PIF_VALUE: 35
PIF_VALUE: 19
PIF_VALUE: 32
PIF_VALUE: 36
PIF_VALUE: 35
PIF_VALUE: 40
PIF_VALUE: 33
PIF_VALUE: 35
PIF_VALUE: 36
PIF_VALUE: 37
PIF_VALUE: 36
PIF_VALUE: 38
PIF_VALUE: 33
PIF_VALUE: 36
PIF_VALUE: 22
PIF_VALUE: 37
PIF_VALUE: 38
PIF_VALUE: 34
PIF_VALUE: 36
PIF_VALUE: 37
PIF_VALUE: 38
PIF_VALUE: 37
PIF_VALUE: 37
PIF_VALUE: 43
PIF_VALUE: 37
PIF_VALUE: 29
PIF_VALUE: 27
PIF_VALUE: 48
PIF_VALUE: 36
PIF_VALUE: 40
PIF_VALUE: 35
PIF_VALUE: 44
PIF_VALUE: 25
PIF_VALUE: 28
PIF_VALUE: 40
PIF_VALUE: 37
PIF_VALUE: 39
PIF_VALUE: 34
PIF_VALUE: 56
PIF_VALUE: 41
PIF_VALUE: 36
PIF_VALUE: 38
PIF_VALUE: 33
PIF_VALUE: 37
PIF_VALUE: 38
PIF_VALUE: 27
PIF_VALUE: 38
PIF_VALUE: 25
PIF_VALUE: 37
PIF_VALUE: 46
PIF_VALUE: 39
PIF_VALUE: 37
PIF_VALUE: 40
PIF_VALUE: 33
PIF_VALUE: 36
PIF_VALUE: 31
PIF_VALUE: 35
PIF_VALUE: 29
PIF_VALUE: 36
PIF_VALUE: 42
PIF_VALUE: 32
PIF_VALUE: 38
PIF_VALUE: 33
PIF_VALUE: 37
PIF_VALUE: 37
PIF_VALUE: 39
PIF_VALUE: 40
PIF_VALUE: 38
PIF_VALUE: 39
PIF_VALUE: 41
PIF_VALUE: 35
PIF_VALUE: 33
PIF_VALUE: 27
PIF_VALUE: 30
PIF_VALUE: 40
PIF_VALUE: 36
PIF_VALUE: 31
PIF_VALUE: 40
PIF_VALUE: 37
PIF_VALUE: 35
PIF_VALUE: 36
PIF_VALUE: 35
PIF_VALUE: 36
PIF_VALUE: 37
PIF_VALUE: 38
PIF_VALUE: 33
PIF_VALUE: 34
PIF_VALUE: 39
PIF_VALUE: 36
PIF_VALUE: 39
PIF_VALUE: 32
PIF_VALUE: 36
PIF_VALUE: 40
PIF_VALUE: 36
PIF_VALUE: 35
PIF_VALUE: 37
PIF_VALUE: 40
PIF_VALUE: 47
PIF_VALUE: 37
PIF_VALUE: 38
PIF_VALUE: 31
PIF_VALUE: 29
PIF_VALUE: 36
PIF_VALUE: 35
PIF_VALUE: 35
PIF_VALUE: 33
PIF_VALUE: 35
PIF_VALUE: 36
PIF_VALUE: 37
PIF_VALUE: 36
PIF_VALUE: 34
PIF_VALUE: 33
PIF_VALUE: 34
PIF_VALUE: 39
PIF_VALUE: 38
PIF_VALUE: 31
PIF_VALUE: 37
PIF_VALUE: 41
PIF_VALUE: 36
PIF_VALUE: 33

## 2020-01-01 ASSESSMENT — PAIN DESCRIPTION - LOCATION
LOCATION: ABDOMEN
LOCATION: CHEST
LOCATION: GENERALIZED
LOCATION: BACK

## 2020-01-01 ASSESSMENT — PAIN DESCRIPTION - DESCRIPTORS
DESCRIPTORS: PATIENT UNABLE TO DESCRIBE
DESCRIPTORS: CRAMPING
DESCRIPTORS: THROBBING
DESCRIPTORS: ACHING;DULL;DISCOMFORT

## 2020-01-01 ASSESSMENT — PAIN DESCRIPTION - ORIENTATION
ORIENTATION: MID
ORIENTATION: LOWER;MID

## 2020-01-01 ASSESSMENT — PAIN - FUNCTIONAL ASSESSMENT: PAIN_FUNCTIONAL_ASSESSMENT: ACTIVITIES ARE NOT PREVENTED

## 2020-01-01 ASSESSMENT — PAIN DESCRIPTION - ONSET
ONSET: UNABLE TO ASSESS
ONSET: GRADUAL

## 2020-01-01 ASSESSMENT — PAIN DESCRIPTION - FREQUENCY
FREQUENCY: INTERMITTENT
FREQUENCY: CONTINUOUS
FREQUENCY: CONTINUOUS

## 2020-01-01 ASSESSMENT — PAIN DESCRIPTION - PAIN TYPE
TYPE: ACUTE PAIN

## 2020-01-01 ASSESSMENT — PAIN DESCRIPTION - PROGRESSION
CLINICAL_PROGRESSION: NOT CHANGED
CLINICAL_PROGRESSION: GRADUALLY WORSENING
CLINICAL_PROGRESSION: GRADUALLY WORSENING

## 2020-11-11 PROBLEM — R09.02 HYPOXIA: Status: ACTIVE | Noted: 2020-01-01

## 2020-11-11 NOTE — ACP (ADVANCE CARE PLANNING)
Advance Care Planning     Advance Care Planning Activator (Inpatient)  Conversation Note      Date of ACP Conversation: 11/11/2020    Conversation Conducted with: Patient with Decision Making Capacity    ACP Activator: ran joy    *When Decision Maker makes decisions on behalf of the incapacitated patient: Decision Maker is asked to consider and make decisions based on patient values, known preferences, or best interests. Health Care Decision Maker:     Current Designated Health Care Decision Maker:   Primary Decision Maker: Gunner James - Spouse - [phone]    Secondary Decision Maker: Janneth Montesinos - Other - [phone]    Care Preferences    Ventilation: \"If you were in your present state of health and suddenly became very ill and were unable to breathe on your own, what would your preference be about the use of a ventilator (breathing machine) if it were available to you? \"      Would the patient desire the use of ventilator (breathing machine)?: yes    \"If your health worsens and it becomes clear that your chance of recovery is unlikely, what would your preference be about the use of a ventilator (breathing machine) if it were available to you? \"     Would the patient desire the use of ventilator (breathing machine)?: Yes      Resuscitation  \"CPR works best to restart the heart when there is a sudden event, like a heart attack, in someone who is otherwise healthy. Unfortunately, CPR does not typically restart the heart for people who have serious health conditions or who are very sick. \"    \"In the event your heart stopped as a result of an underlying serious health condition, would you want attempts to be made to restart your heart (answer \"yes\" for attempt to resuscitate) or would you prefer a natural death (answer \"no\" for do not attempt to resuscitate)? \" yes      NOTE: If the patient has a valid advance directive AND now provides care preference(s) that are inconsistent with that prior directive, advise the patient to consider either: creating a new advance directive that complies with state-specific requirements; or, if that is not possible, orally revoking that prior directive in accordance with state-specific requirements, which must be documented in the EHR. [x] Yes   [] No   Educated Patient / Rowehanna Campbell regarding differences between Advance Directives and portable DNR orders.     Length of ACP Conversation in minutes:  10  Conversation Outcomes:  [x] ACP discussion completed  [x] Existing advance directive reviewed with patient; no changes to patient's previously recorded wishes  [] New Advance Directive completed  [] Portable Do Not Rescitate prepared for Provider review and signature  [] POLST/POST/MOLST/MOST prepared for Provider review and signature      Follow-up plan:    [] Schedule follow-up conversation to continue planning  [x] Referred individual to Provider for additional questions/concerns   [] Advised patient/agent/surrogate to review completed ACP document and update if needed with changes in condition, patient preferences or care setting    [] This note routed to one or more involved healthcare providers

## 2020-11-11 NOTE — ED PROVIDER NOTES
Richie Ramirez is a 77 y.o. male presenting to the ED for fever, nausea, vomiting. States his symptoms began 6 days ago. He does not specify the value to his fever. He admits to associated symptoms of loss of appetite, productive red and green and white cough, diarrhea, urinary frequency, dysuria and hematuria, rhinorrhea, nasal congestion, chest pain, right upper quadrant abdominal pain. He denies sick contacts. He has been on a Z-Michael from his primary care physician without any improvement to his symptoms. His chest pain is pleuritic and located on the right side of his chest and travels to the upper right quadrant of his abdomen. The chest pain is made worse with breathing. He denies alleviating factors. He states he is only on cholesterol medication. In room he is tachypneic and hypoxic on 15 L nonrebreather. He denies recent travel, unilateral leg swelling, recent surgery, hormonal use, history of PEs or DVTs. Patient has a medical history as listed below:   Past Medical History:   Diagnosis Date    Palpitations      Patient Active Problem List    Diagnosis Date Noted    Hypoxia 11/11/2020             Review of Systems   Constitutional: Positive for appetite change and chills. Negative for fever. HENT: Positive for congestion and rhinorrhea. Denies loss of smell or taste   Eyes: Negative for pain and visual disturbance. Respiratory: Positive for cough and shortness of breath. Negative for chest tightness and wheezing. Cardiovascular: Positive for chest pain (R side). Negative for palpitations and leg swelling. Gastrointestinal: Positive for abdominal pain, diarrhea, nausea and vomiting. Negative for blood in stool and constipation. Genitourinary: Positive for dysuria, frequency and hematuria. Musculoskeletal: Negative for arthralgias, myalgias and neck pain. Skin: Negative for rash. Neurological: Negative for dizziness, weakness, numbness and headaches.         Physical Exam  Vitals signs and nursing note reviewed. Constitutional:       General: He is not in acute distress. Appearance: He is well-developed. HENT:      Head: Normocephalic and atraumatic. Nose: Nose normal. No congestion or rhinorrhea. Mouth/Throat:      Pharynx: Oropharynx is clear. Eyes:      Conjunctiva/sclera: Conjunctivae normal.      Pupils: Pupils are equal, round, and reactive to light. Neck:      Musculoskeletal: Normal range of motion. Thyroid: No thyromegaly. Vascular: No JVD. Cardiovascular:      Rate and Rhythm: Regular rhythm. Tachycardia present. Heart sounds: Normal heart sounds. Pulmonary:      Effort: Tachypnea and accessory muscle usage present. Breath sounds: No stridor. Examination of the left-upper field reveals rhonchi. Examination of the left-middle field reveals rhonchi. Rhonchi present. No wheezing or rales. Chest:      Chest wall: No tenderness. Abdominal:      General: Abdomen is flat. There is no distension. Palpations: Abdomen is soft. There is no mass. Tenderness: There is abdominal tenderness in the right upper quadrant. There is no guarding or rebound. Hernia: No hernia is present. Musculoskeletal:      Right lower leg: No edema. Left lower leg: No edema. Skin:     General: Skin is warm and dry. Findings: No rash. Neurological:      Mental Status: He is alert. Mental status is at baseline. Procedures     [facility]     ED Course as of Nov 12 0120   Wed Nov 11, 2020   1520 SARS-CoV-2, NAAT(!): DETECTED [WL]   1555 EKG shows HR of 119, Sinus Tachycardia, left axis, RBBB    [BP]   1704 pO2(!): 64.9 [WL]   2030 Spoke with Dr. Charli Das, discussed case, he agrees to admit patient.    [WL]      ED Course User Index  [BP] Will Lopez DO  [WL] Shaina Dress, DO        Patient presents to the ED for evaluation. This patient was seen and evaluated with the attending.   Work-up was performed with concerns for but not limited to ACS, pneumonia, decompensated heart failure, arrhythmia, viral illness. Covid swab was performed and patient found to be positive. Initially had a lactic acidosis of 2.8 and was given IV fluids. He is not normally on oxygen and was hypoxic on room air upon arrival.  He was then placed on rebreather nasal cannula and only improved to about 88%. High flow nasal cannula was then placed and he improved to 92%. Patient's creatinine found to be 2.4. His D-dimer was elevated. Initially we did D-dimer as a inflammatory marker with concern for the coronavirus. We will hold off on getting a CTA and get a dry skan of his chest as to not further injure his kidneys. Patient will be admitted to medicine. Patient been admitted to medicine. Patient requires continued workup and management of their symptoms and will be admitted to the hospital for further evaluation and treatment.          --------------------------------------------- PAST HISTORY ---------------------------------------------  Past Medical History:  has a past medical history of Palpitations. Past Surgical History:  has no past surgical history on file. Social History:  reports that he quit smoking about 8 years ago. He has never used smokeless tobacco. He reports that he does not drink alcohol or use drugs. Family History: family history is not on file. The patients home medications have been reviewed. Allergies: Patient has no known allergies.     -------------------------------------------------- RESULTS -------------------------------------------------    LABS:  Results for orders placed or performed during the hospital encounter of 11/11/20   Lactate, Sepsis   Result Value Ref Range    Lactic Acid, Sepsis 2.8 (H) 0.5 - 1.9 mmol/L   CBC Auto Differential   Result Value Ref Range    WBC 10.5 4.5 - 11.5 E9/L    RBC 5.32 3.80 - 5.80 E12/L    Hemoglobin 15.3 12.5 - 16.5 g/dL    Hematocrit 44.5 37.0 - 54.0 %    MCV 83.6 80.0 - 99.9 fL    MCH 28.8 26.0 - 35.0 pg    MCHC 34.4 32.0 - 34.5 %    RDW 14.7 11.5 - 15.0 fL    Platelets 872 347 - 989 E9/L    MPV 11.1 7.0 - 12.0 fL    Neutrophils % 74.0 43.0 - 80.0 %    Lymphocytes % 11.0 (L) 20.0 - 42.0 %    Monocytes % 7.0 2.0 - 12.0 %    Eosinophils % 0.0 0.0 - 6.0 %    Basophils % 0.0 0.0 - 2.0 %    Neutrophils Absolute 7.88 (H) 1.80 - 7.30 E9/L    Lymphocytes Absolute 1.89 1.50 - 4.00 E9/L    Monocytes Absolute 0.74 0.10 - 0.95 E9/L    Eosinophils Absolute 0.00 (L) 0.05 - 0.50 E9/L    Basophils Absolute 0.00 0.00 - 0.20 E9/L    Atypical Lymphocytes Relative 7.0 (H) 0.0 - 4.0 %    Metamyelocytes Relative 1.0 0.0 - 1.0 %    nRBC 1.0 /100 WBC    RBC Morphology Normal    Brain Natriuretic Peptide   Result Value Ref Range    Pro- (H) 0 - 125 pg/mL   COVID-19   Result Value Ref Range    SARS-CoV-2, NAAT DETECTED (A) Not Detected   SPECIMEN REJECTION   Result Value Ref Range    Rejected Test CMP,TROP,LIPAS     Reason for Rejection see below    Comprehensive Metabolic Panel w/ Reflex to MG   Result Value Ref Range    Sodium 139 132 - 146 mmol/L    Potassium reflex Magnesium 4.0 3.5 - 5.0 mmol/L    Chloride 99 98 - 107 mmol/L    CO2 25 22 - 29 mmol/L    Anion Gap 15 7 - 16 mmol/L    Glucose 156 (H) 74 - 99 mg/dL    BUN 38 (H) 8 - 23 mg/dL    CREATININE 2.4 (H) 0.7 - 1.2 mg/dL    GFR Non-African American 33 >=60 mL/min/1.73    GFR African American 33     Calcium 8.4 (L) 8.6 - 10.2 mg/dL    Total Protein 7.6 6.4 - 8.3 g/dL    Alb 3.1 (L) 3.5 - 5.2 g/dL    Total Bilirubin 1.7 (H) 0.0 - 1.2 mg/dL    Alkaline Phosphatase 108 40 - 129 U/L     (H) 0 - 40 U/L     (H) 0 - 39 U/L   Troponin   Result Value Ref Range    Troponin <0.01 0.00 - 0.03 ng/mL   Lipase   Result Value Ref Range    Lipase 65 (H) 13 - 60 U/L   D-dimer, quantitative   Result Value Ref Range    D-Dimer, Quant 3674 ng/mL DDU   Blood Gas, Arterial   Result Value Ref Range    Date Analyzed [id number]     Time Analyzed 1621     Source: Blood Arterial     pH, Blood Gas 7.450 7.350 - 7.450    PCO2 37.2 35.0 - 45.0 mmHg    PO2 64.9 (L) 75.0 - 100.0 mmHg    HCO3 25.3 22.0 - 26.0 mmol/L    B.E. 1.6 -3.0 - 3.0 mmol/L    O2 Sat 91.6 (L) 92.0 - 98.5 %    PO2/FIO2 0.72 mmHg/%    AaDO2 516.2 mmHg    RI(T) 795 %    O2Hb 90.9 (L) 94.0 - 97.0 %    COHb 0.3 0.0 - 1.5 %    MetHb 0.5 0.0 - 1.5 %    O2 Content 24.8 mL/dL    HHb 8.3 (H) 0.0 - 5.0 %    tHb (est) 19.5 (H) 11.5 - 16.5 g/dL    Mode HHFNC 50 L     FIO2 90.0 %    Date Of Collection      Time Collected      Pt Temp 37.0 C     ID [id number]     Lab [id number]     Critical(s) Notified . No Critical Values    Ferritin   Result Value Ref Range    Ferritin 1,710 ng/mL   C-Reactive Protein   Result Value Ref Range    CRP 23.0 (H) 0.0 - 0.4 mg/dL   Procalcitonin   Result Value Ref Range    Procalcitonin 0.79 (H) 0.00 - 0.08 ng/mL   Fibrinogen   Result Value Ref Range    Fibrinogen >700 (H) 225 - 540 mg/dL   EKG 12 Lead   Result Value Ref Range    Ventricular Rate 119 BPM    Atrial Rate 119 BPM    P-R Interval 114 ms    QRS Duration 124 ms    Q-T Interval 370 ms    QTc Calculation (Bazett) 520 ms    P Axis 67 degrees    R Axis -67 degrees    T Axis 27 degrees       RADIOLOGY:  CT CHEST WO CONTRAST   Final Result   Multifocal confluent and ground-glass airspace disease in the lungs   bilaterally most consistent with multifocal pneumonia. Emphysematous changes in the lungs with peripheral bulla. Nonspecific mediastinal nodes, which may be reactive. Limited evaluation without intravenous contrast.         CT ABDOMEN PELVIS WO CONTRAST Additional Contrast? None   Final Result   No evidence of acute intra-abdominal pathology. XR CHEST PORTABLE   Final Result   1.  Significant bilateral multifocal pulmonary infiltrates                   ------------------------- NURSING NOTES AND VITALS REVIEWED ---------------------------  Date / Time Roomed:  11/11/2020  2:12 PM  ED Bed Assignment:  07/07    The nursing notes within the ED encounter and vital signs as below have been reviewed. Patient Vitals for the past 24 hrs:   BP Temp Temp src Pulse Resp SpO2 Height Weight   11/12/20 0100 115/77 -- -- 117 -- 97 % -- --   11/12/20 0016 121/89 -- -- 115 -- 97 % -- --   11/11/20 2331 116/72 98.3 °F (36.8 °C) Oral 115 18 93 % -- --   11/11/20 2254 -- -- -- -- -- 94 % -- --   11/11/20 2151 -- -- -- -- -- 95 % -- --   11/11/20 2107 134/82 -- -- 106 22 94 % -- --   11/11/20 2010 -- -- -- -- -- 95 % -- --   11/11/20 1947 101/65 98.9 °F (37.2 °C) Oral 105 20 (!) 70 % -- --   11/11/20 1701 132/72 -- -- 104 22 91 % -- --   11/11/20 1617 106/74 -- -- 104 20 94 % -- --   11/11/20 1451 -- -- -- -- 20 92 % -- --   11/11/20 1443 -- -- -- -- 24 90 % -- --   11/11/20 1428 (!) 104/56 98.6 °F (37 °C) -- 115 24 (!) 76 % 5' 8\" (1.727 m) 233 lb 5 oz (105.8 kg)           Counseling:  I have spoken with the patient/family members and discussed todays results, in addition to providing specific details for the plan of care and counseling regarding the diagnosis and prognosis. Their questions are answered at this time and they are agreeable with the plan of admission. This patient has remained hemodynamically stable during their ED course. Diagnosis:  1. COVID-19    2. Hypoxemia        Disposition:  Patient's disposition: Admit  Patient's condition is stable. NOTE:  This report was transcribed using voice recognition software. Efforts were made to ensure accuracy; however, inadvertent computerized transcription errors may be present.           DO Rey Zamora  11/12/20 [id number]

## 2020-11-12 NOTE — PROGRESS NOTES
[address] [place] [address] Ronny notified of consult via the office, Kapil Elias took consult information.  Teddy worthy

## 2020-11-12 NOTE — CARE COORDINATION
CM NOTE: CM reviewed chart. Covid positive in droplet plus isolation. Currently using O2 60L @ 100% for hypoxia. CM spoke with pt by phone to discuss role, anticipated LOS & current plan of care. Reports living with his wife in 2 story home. States is independent & does not use equipment to get around. Is able to use steps as needed. No hx [address] Discussed dx, isolation, use of oxygen & transition of care. Plan is home with wife when stable for discharge. States has [employer] (payor was not listed upon admission). Declines dme list & states if he needs oxygen for use at home, to use any company that his insurance pays for. CM & SW will continue to follow pt & determine if home care is needed.

## 2020-11-12 NOTE — PROGRESS NOTES
[address] [facility] [address] Claire Willams notified of consult via secured text.  Teddy worthy

## 2020-11-12 NOTE — CONSULTS
Abraham Flanagan M.D.,[facility]  Court Adams D.O., CAT, Samina Bruner M.D. Ynes Rodriguez M.D., Sreedhar Lincoln M.D. Cesario Santillan D.O. Patient:  Isa Hughes 77 y.o. male MRN: [id number]     Date of Service: 11/12/2020      PULMONARY CONSULTATION    Reason for Consultation: covid  Referring Physician: Dr. Michelle Granados with the referring physician will be sent via the electronic medical record. Chief Complaint: SOB     CODE STATUS:full    SUBJECTIVE:  HPI:  Isa Hughes is a 77 y.o. arrived to the ED 1 day ago with fever, nausea, vomiting. He reports the symptoms began last Friday he is unsure if he had a fever. Loss of appetite red green-white productive sputum, urinary frequency, diarrhea rhinorrhea, nasal congestion chest pain right upper quadrant abdominal pain. He had been on a Z-Michael from his primary physician no improvement in symptoms he complains of right sided pleuritic chest pain with cough. ABG within normal range, leukocytosis, lymphopenia elevated D-dimer greater than 5250 he was started on heparin drip, ferritin 1710 and admitted await ID consult for treatment will need remdesivir, infectious work-up. He arrived hypoxic was placed on 15 L nonrebreather he denies history of PEs or DVTs   Reports his occupation is  and he does not think he has had any work exposures, planes that right side hurts with cough . Patient reports that he was seen by pulmonologist  many years ago after pneumonia he has had no follow-up he is on no pulmonary medications he is a former smoker he reports that he quit 15 years ago he was heavy he could not tell me how long he smoked or how much. Patient reports he started feeling ill last Friday with cough productive, thinks he may have had a fever diarrhea and right upper quadrant chest pain and abdominal pain. Z-Michael did not improve his symptoms. Afebrile today.   Patient becomes winded with conversation. Past Medical History:   Diagnosis Date    Palpitations        History reviewed. No pertinent surgical history. History reviewed. No pertinent family history. Social History:   Social History     Socioeconomic History    Marital status:      Spouse name: Not on file    Number of children: Not on file    Years of education: Not on file    Highest education level: Not on file   Occupational History    Not on file   Social Needs    Financial resource strain: Not on file    Food insecurity     Worry: Not on file     Inability: Not on file    Transportation needs     Medical: Not on file     Non-medical: Not on file   Tobacco Use    Smoking status: Former Smoker     Last attempt to quit: 2012     Years since quittin.8    Smokeless tobacco: Never Used   Substance and Sexual Activity    Alcohol use: No    Drug use: No    Sexual activity: Not on file   Lifestyle    Physical activity     Days per week: Not on file     Minutes per session: Not on file    Stress: Not on file   Relationships    Social connections     Talks on phone: Not on file     Gets together: Not on file     Attends Worship service: Not on file     Active member of club or organization: Not on file     Attends meetings of clubs or organizations: Not on file     Relationship status: Not on file    Intimate partner violence     Fear of current or ex partner: Not on file     Emotionally abused: Not on file     Physically abused: Not on file     Forced sexual activity: Not on file   Other Topics Concern    Not on file   Social History Narrative    Not on file     Smoking history: quit 15 years,  smoked alot  ETOH:   reports no history of alcohol use. Exposures: There  is not history of TB or TB exposure. There is not asbestos or silica dust exposure. The patient reports does not have coal, foundry, quarry or [employer] exposure. Recent travel history none.   There is not  history of recreational or IV drug use. There is not hot tub exposure. The patient does not have any exotic pets, turtles or exotic birds. Vaccines:    Influenza: unknown  Pneumococcal Polysaccharide:  unknonw    There is no immunization history on file for this patient. Home Meds: Medications Prior to Admission: tramadol (ULTRAM-ER) 300 MG tablet, Take 300 mg by mouth daily. CURRENT MEDS :  Scheduled Meds:   sodium chloride flush  10 mL Intravenous 2 times per day    vitamin C  1,000 mg Oral BID    zinc sulfate  50 mg Oral BID    vitamin D3  400 Units Oral Daily    dexamethasone  6 mg Intravenous Q12H       Continuous Infusions:   heparin (PORCINE) Infusion 11.4 Units/kg/hr (11/12/20 0849)       No Known Allergies    REVIEW OF SYSTEMS:  Constitutional: Denies fever, weight loss, night sweats, and fatigue  Skin: Denies pigmentation, dark lesions, and rashes   HEENT: Denies hearing loss, tinnitus, ear drainage, epistaxis, sore throat, and hoarseness. Cardiovascular: Denies palpitations, chest pain, and chest pressure. Respiratory: +cough,+ dyspnea at rest, hemoptysis, apnea, and choking.   Gastrointestinal: Denies nausea, vomiting, poor appetite, diarrhea, heartburn or reflux  Genitourinary: Denies dysuria, frequency, urgency or hematuria  Musculoskeletal: Denies myalgias, muscle weakness, and bone pain  Neurological: Denies dizziness, vertigo, headache, and focal weakness  Psychological: Denies anxiety and depression  Endocrine: Denies heat intolerance and cold intolerance  Hematopoietic/Lymphatic: Denies bleeding problems and blood transfusions    OBJECTIVE:   /76   Pulse 101   Temp 98.3 °F (36.8 °C) (Oral)   Resp 20   Ht 5' 8\" (1.727 m)   Wt 233 lb 5 oz (105.8 kg)   SpO2 (!) 85%   BMI 35.48 kg/m²   SpO2 Readings from Last 1 Encounters:   11/12/20 (!) 85%        I/O:    Intake/Output Summary (Last 24 hours) at 11/12/2020 1237  Last data filed at 11/11/2020 1947  Gross per 24 hour   Intake 1000 ml Output --   Net 1000 ml     Vent Information  Skin Assessment: Clean, dry, & intact  FiO2 : 100 %  SpO2: (!) 85 %  SpO2/FiO2 ratio: 85  Humidification Source: Heated wire  Humidification Temp: 34  Humidification Temp Measured: 34  Mask Type: Full face mask  Mask Size: Medium       IPAP: 14 cmH20  CPAP/EPAP: 8 cmH2O     Physical Exam:  General: The patient is lying in bed comfortably without any distress. Breathing is lighlty  labored  HEENT: Pupils are equal round and reactive to light, there are no oral lesions and no post-nasal drip   Neck: supple without adenopathy  Cardiovascular: regular rate and rhythm without murmur or gallop  Respiratory: Clear to auscultation bilaterally without wheezing or crackles. Air entry is symmetric  Abdomen: soft, non-tender, non-distended, normal bowel sounds  Extremities: warm, no edema, no clubbing  Skin: no rash or lesion  Neurologic: CN II-XII grossly intact, no focal deficits    Pulmonary Function Testing personally reviewed and interpreted  none    Imaging personally reviewed:  EXAMINATION:    CT OF THE CHEST WITHOUT CONTRAST 11/11/2020 5:21 pm         TECHNIQUE:    CT of the chest was performed without the administration of intravenous    contrast. Multiplanar reformatted images are provided for review.  Dose    modulation, iterative reconstruction, and/or weight based adjustment of the    mA/kV was utilized to reduce the radiation dose to as low as reasonably    achievable.         COMPARISON:    None.         HISTORY:    ORDERING SYSTEM PROVIDED HISTORY: PE concern    TECHNOLOGIST PROVIDED HISTORY:    Reason for exam:->PE concern         FINDINGS:    Mediastinum: Nonspecific mediastinal nodes, which may be reactive.         no aneurysmal dilatation of the thoracic aorta.         No pericardial effusion.         No evidence of hilar adenopathy.         Lungs/pleura: Multifocal confluent and ground-glass airspace disease in the    lungs bilaterally, most extensive in the emphysematous changes per referral bulla nonspecific mediastinal nodes  3. Leukocytosis   4. THOMAS  5. Hypocalcemia  6. Elevated LFT  7. Lymphopenia    Plan:  1. Check strep pneumoniae antigen  , legionella urine  , respiratory culture  , pro calcitonin . 79 proBNP 641  2. D-dimer >5250 on Heparin gtt   3. Decadron 6 twice daily  4. Vit c , zinc   5. Keep pulse oximetry pulse oximetry greater than 90% currently on OptiFlow 100%  60L, had been on BIPAP since admission   6. Remdesivir per infectious disease recommendations 200 mg day 1 followed by 100 mg 4 more days      Thank you for allowing me to participate in the care of Huyen Mariee. Please feel free to call with questions. This plan of care was reviewed in collaboration with Dr. Shelbi Coats    Electronically signed by JASPER Maravilla on 11/12/2020 at 12:37 PM      Note: This report was completed utilizing computer voice recognition software. Every effort has been made to ensure accuracy, however; inadvertent computerized transcription errors may be present      I personally saw, examined and provided care for the patient. Radiographs, labs and medication list were reviewed by me independently. Patient while resting on his left side on OptiFlow with flow of 60 L FiO2 of 100% is only saturating 86%. I personally placed him on his BiPAP. Adjusted his BiPAP settings to 16/12 and decrease his FiO2 to 90% after few minutes on the BiPAP patient saturations improved up to 94%. Explained in detail to the patient the difference between the BiPAP and the OptiFlow and the reason I would like him to wear the BiPAP at least for tonight and most of the day tomorrow. Explained to him the role in lung recruitment and hypoxia in Covid patients. Understandingly is very fearful, much emotional support and reassurance was given. He did mention to me that in case he does not get better he does not wish to be intubated.     Continue the regimen of remdesivir, Decadron,

## 2020-11-12 NOTE — PROGRESS NOTES
Date: 11/12/2020    Time: 5:03 AM    Patient Placed On BIPAP/CPAP/ Non-Invasive Ventilation? Yes    If no must comment. Facial area red/color change? No           If YES are Blister/Lesion present? No   If yes must notify nursing staff  BIPAP/CPAP skin barrier? Yes    Skin barrier type:mepilexlite       Comments: Pt placed on BiPAP in ER for increase WOB and decreased oxygenation despite the airvo and NRB. FiO2 started at 100% will wean as tolerated. Skin prep applied to nasal bridge and mepilex placed. Machine plugged into red outlet.         Sumeet Hugo      11/12/20 0449   NIV Type   $NIV $Daily Charge   Skin Assessment Clean, dry, & intact   Skin Protection for O2 Device Yes   Orientation Middle   Location Other (Comment)  (nose)   Mode Bilevel   Mask Type Full face mask   Mask Size Medium   Settings/Measurements   IPAP 14 cmH20   CPAP/EPAP 8 cmH2O   Rate Ordered 12   Resp (!) 41   FiO2  100 %   Vt Exhaled 688 mL   Minute Volume 29.5 Liters   Mask Leak (lpm) 29 lpm   Comfort Level Good   Using Accessory Muscles No   SpO2 92

## 2020-11-12 NOTE — H&P
[facility] History and Physical      CHIEF COMPLAINT:  sob    History of Present Illness:  77year old male with prior hx of tobacco abuse, hlp. Sees a physician, doesn't know what he is being treated for other than hlp. On Friday developed congestion, headache, dry cough, sore throat decreased appetite and shortness of breath. No anosmia or dysgeusia. This has progressed since then. Has also had loose bowels but no nausea or vomiting. Felt he had a fever but didn't check his temperature. Believes he contracted it from his neighbor. Presented to the ed due to progression of his sx. Was hypoxic down to     Informant(s) for H&P:pt    REVIEW OF SYSTEMS:  A comprehensive 14 point review of systems was negative except for: what is in the HPI    PMH:  Past Medical History:   Diagnosis Date    Palpitations        Surgical History:  History reviewed. No pertinent surgical history. Medications Prior to Admission:    Prior to Admission medications    Medication Sig Start Date End Date Taking? Authorizing Provider   tramadol (ULTRAM-ER) 300 MG tablet Take 300 mg by mouth daily. Historical Provider, MD       Allergies:    Patient has no known allergies. Social History:    reports that he quit smoking about 8 years ago. He has never used smokeless tobacco. He reports that he does not drink alcohol or use drugs. Family History:   No fam hx of lung dz      PHYSICAL EXAM:  Vitals:  /65   Pulse 105   Temp 98.9 °F (37.2 °C) (Oral)   Resp 20   Ht 5' 8\" (1.727 m)   Wt 233 lb 5 oz (105.8 kg)   SpO2 (!) 70%   BMI 35.48 kg/m²   General Appearance: alert and oriented to person, place and time and in no acute distress.  On airvo  Skin: warm and dry, turgor not diminished  Head: normocephalic and atraumatic  Eyes: pupils equal, round, and reactive to light, extraocular eye movements intact, conjunctivae normal  Neck: neck supple and non tender without mass   Pulmonary/Chest: clear to auscultation bilaterally- no wheezes, rales or rhonchi, normal air movement, no respiratory distress  Cardiovascular: normal rate, normal S1 and S2 and no M/R/R  Abdomen: soft, non-tender, non-distended, normal bowel sounds, no masses or organomegaly  Extremities: no cyanosis, no clubbing and no edema  Neurologic: no cranial nerve deficit and speech normal        LABS:  Recent Labs     11/11/20  1601      K 4.0   CL 99   CO2 25   BUN 38*   CREATININE 2.4*   GLUCOSE 156*   CALCIUM 8.4*       Recent Labs     11/11/20  1437   WBC 10.5   RBC 5.32   HGB 15.3   HCT 44.5   MCV 83.6   MCH 28.8   MCHC 34.4   RDW 14.7      MPV 11.1       No results for input(s): POCGLU in the last 72 hours. Radiology:   CT CHEST WO CONTRAST   Final Result   Multifocal confluent and ground-glass airspace disease in the lungs   bilaterally most consistent with multifocal pneumonia. Emphysematous changes in the lungs with peripheral bulla. Nonspecific mediastinal nodes, which may be reactive. Limited evaluation without intravenous contrast.         CT ABDOMEN PELVIS WO CONTRAST Additional Contrast? None   Final Result   No evidence of acute intra-abdominal pathology. XR CHEST PORTABLE   Final Result   1. Significant bilateral multifocal pulmonary infiltrates             EKG: pending    ASSESSMENT:    Covid 19 pneumonia  Acute respiratory failure with hypoxia  Elevated creatinine: no recent baseline. Likely at least acute component  transaminitis  Emphysema  Elevated ddimer    PLAN:  Covid pneumonia  -start dexamethasone 6 mg IV bid  - check inflammatory markers  -check procalcitonin, hold abx for now. -start ascorbic acid, zinc, vitamin D  -consult ID and pulm  -continue airvo. wean down supplemental oxygen as tolerated  -ddimer significantly elevated. Start empiric full dose anticoagulation for now. Elevated creatinine: trend for now. 1L normal saline and reassess. Ct shows no hydro.  Hold off on ultrasound due to covid. Transaminitis: due to #1. Trend. Code Status: full  DVT prophylaxis: heparin    NOTE: This report was transcribed using voice recognition software. Every effort was made to ensure accuracy; however, inadvertent computerized transcription errors may be present.   Electronically signed by Simón Redding MD on 11/11/2020 at 8:02 PM

## 2020-11-12 NOTE — PROGRESS NOTES
[facility] Progress Note    Admitting Date and Time: 11/11/2020  2:12 PM  Admit Dx: Hypoxia [R09.02]  Hypoxia [R09.02]    Subjective:  Patient is being followed for Hypoxia [R09.02]  Hypoxia [R09.02]   Pt feels some improvement with his breathing, he was desaturating on 15 L HF NC to the mid 80s      ROS: denies fever, chills, cp, n/v, HA unless stated above.       sodium chloride flush  10 mL Intravenous 2 times per day    vitamin C  1,000 mg Oral BID    zinc sulfate  50 mg Oral BID    vitamin D3  400 Units Oral Daily    dexamethasone  6 mg Intravenous Q12H     sodium chloride flush, 10 mL, PRN  acetaminophen, 650 mg, Q6H PRN    Or  acetaminophen, 650 mg, Q6H PRN  polyethylene glycol, 17 g, Daily PRN  promethazine, 12.5 mg, Q6H PRN    Or  ondansetron, 4 mg, Q6H PRN  heparin (porcine), 4,000 Units, PRN  heparin (porcine), 2,000 Units, PRN         Objective:    /76   Pulse 101   Temp 98.3 °F (36.8 °C) (Oral)   Resp (!) 41   Ht 5' 8\" (1.727 m)   Wt 233 lb 5 oz (105.8 kg)   SpO2 93%   BMI 35.48 kg/m²     General Appearance: alert and oriented to person, place and time and in no acute distress  Skin: warm and dry  Head: normocephalic and atraumatic  Eyes: pupils equal, round, and reactive to light, extraocular eye movements intact, conjunctivae normal  Neck: neck supple and non tender without mass   Pulmonary/Chest: diminished  bilaterally- no wheezes, rales or rhonchi, normal air movement  Cardiovascular: normal rate, normal S1 and S2 and no carotid bruits  Abdomen: soft, non-tender, non-distended, normal bowel sounds, no masses or organomegaly  Extremities: no cyanosis, no clubbing and no edema  Neurologic: no cranial nerve deficit and speech normal        Recent Labs     11/11/20  1601 11/12/20  0545    139   K 4.0 4.2   CL 99 103   CO2 25 24   BUN 38* 37*   CREATININE 2.4* 1.9*   GLUCOSE 156* 201*   CALCIUM 8.4* 8.3*       Recent Labs     11/11/20  1437 11/12/20  0545 WBC 10.5 11.9*   RBC 5.32 5.11   HGB 15.3 14.6   HCT 44.5 44.1   MCV 83.6 86.3   MCH 28.8 28.6   MCHC 34.4 33.1   RDW 14.7 15.0    246   MPV 11.1 10.1         Assessment:    Active Problems:    Hypoxia  Resolved Problems:    * No resolved hospital problems. *      Plan:  1. Acute hypoxic respiratory failure due to COVID-19 pneumonia, presented with oxygen saturation 76% on room air, requiring Vapotherm 50 L 100% FiO2 to maintain oxygen saturation above 90. Will treat underlying process and attempt to wean down/off oxygen. 2.  Acute COVID-19 pneumonia, start the patient on Decadron, ID was consulted for remdesivir. We will check inflammatory markers. 3.  Suspecting cytokine storm with elevated D-dimer, starting full dose anticoagulation. 4.  Acute renal failure, presented with creatinine of 2.4 not sure about the patient's baseline he might have CKD. Patient received IV fluids in the ED and creatinine trended down to 1.9. Telemetry monitoring consider consulting nephrology. 5.  Elevated transaminases, most likely due to COVID-19 infection and possible cytokine storm, trending down, continue to monitor. NOTE: This report was transcribed using voice recognition software. Every effort was made to ensure accuracy; however, inadvertent computerized transcription errors may be present.   Electronically signed by Robyn Lockett MD on 11/12/2020 at 8:09 AM

## 2020-11-12 NOTE — PROGRESS NOTES
Date: 11/12/2020    Time: 5:02 PM    Patient Placed On BIPAP/CPAP/ Non-Invasive Ventilation? No    If no must comment. Facial area red/color change? No           If YES are Blister/Lesion present? No   If yes must notify nursing staff  BIPAP/CPAP skin barrier?   Yes    Skin barrier type:mepilexlite       Comments:Dr. Padron placed patient om BIPAP      Riya Briscoe

## 2020-11-12 NOTE — CONSULTS
[address] [place] [address] [facility]  Consultation Note     Admit Date: 11/11/2020  2:12 PM    Reason for Consult:   Gail    Attending Physician:  Brandon Lux MD    HISTORY OF PRESENT ILLNESS:             The history is obtained from extensive review of available past medical records. 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 He says he consistently wears a mask. He lives at home with his wife. He has not been in family gatherings. 1 week prior to the admission he noted headache, followed by a cough, subjective fevers and progressive dyspnea that started on exertion. Presents to the hospital on 11/11/2012 and had a CT that showed extensive groundglass opacities and tested positive for SARS-CoV-2. His wife now has some mild symptoms and is being tested. His son is asymptomatic. Past Medical History:        Diagnosis Date    Palpitations      Past Surgical History:    History reviewed. No pertinent surgical history. Current Medications:   Scheduled Meds:   influenza virus vaccine  0.5 mL Intramuscular Prior to discharge    sodium chloride flush  10 mL Intravenous 2 times per day    vitamin C  1,000 mg Oral BID    zinc sulfate  50 mg Oral BID    vitamin D3  400 Units Oral Daily    dexamethasone  6 mg Intravenous Q12H     Continuous Infusions:   heparin (PORCINE) Infusion 11.4 Units/kg/hr (11/12/20 0849)     PRN Meds:sodium chloride flush, acetaminophen **OR** acetaminophen, polyethylene glycol, promethazine **OR** ondansetron, heparin (porcine), heparin (porcine)    Allergies:  Patient has no known allergies.     Social History:   Social History     Socioeconomic History    Marital status:      Spouse name: None    Number of children: None    Years of education: None    Highest education level: None   Occupational History    None   Social Needs    Financial resource strain: None    Food insecurity     Worry: None     Inability: None    lymphadenopathy. Chest: No use of accessory muscles to breathe. Symmetrical expansion. Auscultation reveals faint crackles posteriorly. Cardiovascular: S1 and S2 are rhythmic and regular. No murmurs appreciated. Abdomen: Positive bowel sounds to auscultation. Benign to palpation. No masses felt. No hepatosplenomegaly. Extremities: No clubbing, no cyanosis, no edema. Lines: peripheral      CBC+dif:  Recent Labs     11/11/20  1437 11/12/20  0545   WBC 10.5 11.9*   HGB 15.3 14.6   HCT 44.5 44.1   MCV 83.6 86.3    246   NEUTROABS 7.88* 10.23*     Lab Results   Component Value Date    CRP 23.0 (H) 11/11/2020      No results found for: CRPHS  No results found for: SEDRATE  Lab Results   Component Value Date     (H) 11/12/2020     (H) 11/12/2020    ALKPHOS 120 11/12/2020    BILITOT 1.1 11/12/2020     Lab Results   Component Value Date     11/12/2020    K 4.2 11/12/2020     11/12/2020    CO2 24 11/12/2020    BUN 37 11/12/2020    CREATININE 1.9 11/12/2020    GFRAA 43 11/12/2020    LABGLOM 43 11/12/2020    GLUCOSE 201 11/12/2020    GLUCOSE 154 02/16/2012    PROT 7.4 11/12/2020    LABALBU 3.0 11/12/2020    CALCIUM 8.3 11/12/2020    BILITOT 1.1 11/12/2020    ALKPHOS 120 11/12/2020     11/12/2020     11/12/2020       Lab Results   Component Value Date    PROTIME 10.9 02/16/2012    INR 0.9 02/16/2012       No results found for: TSH    No results found for: NITRITE, COLORU, PHUR, LABCAST, WBCUA, RBCUA, MUCUS, TRICHOMONAS, YEAST, BACTERIA, CLARITYU, SPECGRAV, LEUKOCYTESUR, UROBILINOGEN, BILIRUBINUR, BLOODU, GLUCOSEU, AMORPHOUS    Lab Results   Component Value Date    HCO3 23.6 11/12/2020    BE -1.3 11/12/2020    O2SAT 94.5 11/12/2020    PH 7.381 11/12/2020    PCO2 40.8 11/12/2020    PO2 78.8 11/12/2020     Radiology:      Microbiology:  Pending  No results for input(s): BC in the last 72 hours. No results for input(s): ORG in the last 72 hours.   No results for input(s): Almyra Carrel in the last 72 hours. No results for input(s): STREPNEUMAGU in the last 72 hours. No results for input(s): LP1UAG in the last 72 hours. No results for input(s): ASO in the last 72 hours. No results for input(s): CULTRESP in the last 72 hours. Recent Labs     11/11/20  1601   PROCAL 0.79*       Assessment:  · Moderate to severe SARS-CoV-2 infection with pneumonia  · Lymphopenia associated to the above  · Mild leukocytosis associated to the above. Possible bacterial pneumonia  · Elevation of transaminases associated to SARS-CoV-2 infection    Plan:    · Start Remdesivir  · Continue Dexamethasone  · Start oral Levofloxacin  · Check cultures, baseline ESR, CRP  · Urine antigens  · Consider anticoagulation  · Will follow with you    Thank you for having us see this patient in consultation. I will be discussing this case with the treating physicians.     Modesta Preciado  2:47 PM  11/12/2020

## 2020-11-13 NOTE — CARE COORDINATION
CM NOTE: Chart reviewed. Covid positive in droplet plus isolation. Using O2 60L with intermittent hypoxia despite using HF O2. ID & pulmonology following. Day #2 IV RDV. Continue decadron, po levaquin. Await cultures. Plan remains home at discharge. Unsure if he wants home care at this time. CM & SW continue to follow pt.

## 2020-11-13 NOTE — PROGRESS NOTES
Date: 11/13/2020    Time: 10:43 AM    Patient Placed On BIPAP/CPAP/ Non-Invasive Ventilation? Yes    If no must comment. Facial area red/color change? No           If YES are Blister/Lesion present? No   If yes must notify nursing staff  BIPAP/CPAP skin barrier? Yes    Skin barrier type:mepilexlite       Comments:pt.  Is resting comfortably and saturation is 93% at this time       [employer]

## 2020-11-13 NOTE — PROGRESS NOTES
of COVID-19 and also the need to preserve PPE for other caregivers, a face-to-face encounter with the patient was not performed. That being said, all relevant records and diagnostic tests were reviewed, including laboratory results and imaging. Please reference any relevant documentation elsewhere. Care will be coordinated with the primary service. As seen through the window: the patient is lying in bed in no apparent distress. He is currently on bipap. He was on Optiflow 60L 100% but was not tolerating. Per nursing, his lung sounds are diminished and he has a slight nonproductive cough. Pertinent/ New Labs and Imaging Studies     Imaging Personally Reviewed:  11/11  Significant bilateral multifocal pulmonary infiltrates     CT chest 11/11  Multifocal confluent and ground-glass airspace disease in the lungs    bilaterally most consistent with multifocal pneumonia.         Emphysematous changes in the lungs with peripheral bulla.         Nonspecific mediastinal nodes, which may be reactive.         Limited evaluation without intravenous contrast.          ECHO  None on file    Labs:  Lab Results   Component Value Date    WBC 14.0 11/13/2020    HGB 13.4 11/13/2020    HCT 41.7 11/13/2020    MCV 86.7 11/13/2020    MCH 27.9 11/13/2020    MCHC 32.1 11/13/2020    RDW 14.8 11/13/2020     11/13/2020    MPV 10.7 11/13/2020     Lab Results   Component Value Date     11/13/2020    K 3.7 11/13/2020     11/13/2020    CO2 24 11/13/2020    BUN 35 11/13/2020    CREATININE 1.6 11/13/2020    LABALBU 2.7 11/13/2020    CALCIUM 8.3 11/13/2020    GFRAA 52 11/13/2020    LABGLOM 52 11/13/2020     Lab Results   Component Value Date    PROTIME 10.9 02/16/2012    INR 0.9 02/16/2012     Recent Labs     11/11/20  1437   PROBNP 641*     Recent Labs     11/12/20  1603   PROCAL 0.64*     This SmartLink has not been configured with any valid records. Assessment:    1.  Severe COVID 19 with possible bacterial pna 2. Multifocal GGO, emphysematous changes per referral bulla nonspecific mediastinal nodes  3. Leukocytosis   4. THOMAS  5. Hypocalcemia  6. Elevated LFT  7. Lymphopenia      Plan:   1. Check strep pneumoniae antigen pending, legionella urine pending, respiratory culture pending, pro calcitonin . 79 proBNP 641  2. D-dimer >5250 on Heparin gtt   3. Decadron 6 twice daily  4. Vit c , zinc   5. Keep pulse oximetry pulse oximetry greater than 90% currently on OptiFlow 100%  60L, had been on BIPAP since admission, not able to come off bipap  6. Remdesivir per infectious disease recommendations 200 mg day 1 followed by 100 mg 4 more days, Levaquin      This plan of care was reviewed in collaboration with Dr. Param Orozco  Electronically signed by JASPER Clay CNP on 11/13/2020 at 12:12 PM      Addendum:    I personally saw and examined patient. Talk to him extensively. Gave him reassurance. Adjusted his BiPAP settings increase his EPAP to 14 and decrease his FiO2 to 85%. Encourage patient to self proning. I personally saw, examined, and cared for the patient. Labs, medications, radiographs reviewed. I agree with history exam and plans detailed in NP note.   Jen Sabillon MD

## 2020-11-13 NOTE — PROGRESS NOTES
WBC 14.0 (H) 11/13/2020    WBC 11.9 (H) 11/12/2020    WBC 10.5 11/11/2020    HGB 13.4 11/13/2020    HCT 41.7 11/13/2020    MCV 86.7 11/13/2020     11/13/2020     Lab Results   Component Value Date    NEUTROABS 12.04 (H) 11/13/2020    NEUTROABS 10.23 (H) 11/12/2020    NEUTROABS 7.88 (H) 11/11/2020     No results found for: [facility]  Lab Results   Component Value Date    ALT 93 (H) 11/13/2020    AST 85 (H) 11/13/2020    ALKPHOS 128 11/13/2020    BILITOT 0.6 11/13/2020     Lab Results   Component Value Date     11/13/2020    K 3.7 11/13/2020     11/13/2020    CO2 24 11/13/2020    BUN 35 11/13/2020    CREATININE 1.6 11/13/2020    CREATININE 1.9 11/12/2020    CREATININE 2.4 11/11/2020    GFRAA 52 11/13/2020    LABGLOM 52 11/13/2020    GLUCOSE 178 11/13/2020    GLUCOSE 154 02/16/2012    PROT 7.0 11/13/2020    LABALBU 2.7 11/13/2020    CALCIUM 8.3 11/13/2020    BILITOT 0.6 11/13/2020    ALKPHOS 128 11/13/2020    AST 85 11/13/2020    ALT 93 11/13/2020     Lab Results   Component Value Date    CRP 27.8 (H) 11/12/2020    CRP 23.0 (H) 11/11/2020     Lab Results   Component Value Date    SEDRATE 80 (H) 11/12/2020     Radiology:      Microbiology:   Streptococcus pneumoniae/Legionella urine Ag: nega pending tive   Blood cultures 11/11/2020: Negative so far  Respiratory culture ordered but not sent    Recent Labs     11/11/20  1601 11/12/20  1603   PROCAL 0.79* 0.64*       ASSESSMENT:  · Moderate to severe SARS-CoV-2 infection with pneumonia  · Lymphopenia secondary to SARS-CoV-2 infection  · Leukocytosis associated to the above and steroids  · Probable community-acquired pneumonia    PLAN:  · Continue Levofloxacin  · Continue Remdesivir, day 2  · Continue Dexamethasone  · Check final cultures  · Monitor labs    Discussed with Dr. Vikram Watkins  11:50 AM  11/13/2020

## 2020-11-13 NOTE — PROGRESS NOTES
[facility] Progress Note    Admitting Date and Time: 11/11/2020  2:12 PM  Admit Dx: Hypoxia [R09.02]  Hypoxia [R09.02]    Subjective:  Patient is being followed for Hypoxia [R09.02]  Hypoxia [R09.02]   Pt feels some improvement with his breathing, he was desaturating on 15 L HF NC to the mid 80s  currently on 60 L with 100% FiO2      ROS: denies fever, chills, cp, n/v, HA unless stated above.       influenza virus vaccine  0.5 mL Intramuscular Prior to discharge    dexamethasone  6 mg Oral 2 times per day    levofloxacin  750 mg Intravenous Q24H    remdesivir IVPB  100 mg Intravenous Q24H    sodium chloride flush  10 mL Intravenous 2 times per day    vitamin C  1,000 mg Oral BID    zinc sulfate  50 mg Oral BID    vitamin D3  400 Units Oral Daily     sodium chloride, 30 mL, PRN  sodium chloride flush, 10 mL, PRN  acetaminophen, 650 mg, Q6H PRN    Or  acetaminophen, 650 mg, Q6H PRN  polyethylene glycol, 17 g, Daily PRN  promethazine, 12.5 mg, Q6H PRN    Or  ondansetron, 4 mg, Q6H PRN  heparin (porcine), 4,000 Units, PRN  heparin (porcine), 2,000 Units, PRN         Objective:    /76   Pulse 95   Temp 96.9 °F (36.1 °C) (Axillary)   Resp 30   Ht 5' 8\" (1.727 m)   Wt 233 lb 5 oz (105.8 kg)   SpO2 90%   BMI 35.48 kg/m²     General Appearance: alert and oriented to person, place and time and in no acute distress  Skin: warm and dry  Head: normocephalic and atraumatic  Eyes: pupils equal, round, and reactive to light, extraocular eye movements intact, conjunctivae normal  Neck: neck supple and non tender without mass   Pulmonary/Chest: diminished  bilaterally- no wheezes, rales or rhonchi, normal air movement  Cardiovascular: normal rate, normal S1 and S2 and no carotid bruits  Abdomen: soft, non-tender, non-distended, normal bowel sounds, no masses or organomegaly  Extremities: no cyanosis, no clubbing and no edema  Neurologic: no cranial nerve deficit and speech normal        Recent Labs     11/11/20  1601 11/12/20  0545 11/13/20  0215    139 139   K 4.0 4.2 3.7   CL 99 103 102   CO2 25 24 24   BUN 38* 37* 35*   CREATININE 2.4* 1.9* 1.6*   GLUCOSE 156* 201* 178*   CALCIUM 8.4* 8.3* 8.3*       Recent Labs     11/11/20  1437 11/12/20  0545 11/13/20  0215   WBC 10.5 11.9* 14.0*   RBC 5.32 5.11 4.81   HGB 15.3 14.6 13.4   HCT 44.5 44.1 41.7   MCV 83.6 86.3 86.7   MCH 28.8 28.6 27.9   MCHC 34.4 33.1 32.1   RDW 14.7 15.0 14.8    246 289   MPV 11.1 10.1 10.7         Assessment:    Active Problems:    Hypoxia  Resolved Problems:    * No resolved hospital problems. *      Plan:  1. Acute hypoxic respiratory failure due to COVID-19 pneumonia, presented with oxygen saturation 76% on room air, requiring Vapotherm 60 L 100% FiO2 to maintain oxygen saturation above 90. Patient might need to be transferred the ICU and get intubated  2. Acute COVID-19 pneumonia, start the patient on Decadron, ID was consulted for remdesivir. We will check inflammatory markers. 3.  Superimposed bacterial pneumonia, procalcitonin is elevated at 0.6, add levaquin  4. Suspecting cytokine storm with elevated D-dimer, starting full dose anticoagulation. 5.  Acute renal failure, presented with creatinine of 2.4 not sure about the patient's baseline he might have CKD. Patient received IV fluids in the ED and creatinine trended down to 1.9. Telemetry monitoring consider consulting nephrology. 6.  Elevated transaminases, most likely due to COVID-19 infection and possible cytokine storm, trending down, continue to monitor. NOTE: This report was transcribed using voice recognition software. Every effort was made to ensure accuracy; however, inadvertent computerized transcription errors may be present.   Electronically signed by Todd Mcnair MD on 11/13/2020 at 9:58 AM

## 2020-11-14 NOTE — PROGRESS NOTES
[address] [place] [address] [facility]  Progress Note    SUBJECTIVE:  Chief Complaint   Patient presents with    Shortness of Breath     76% room air at arrival     Fever    Emesis     The patient is tolerating medications without any side effects. He thinks he is breathing better. No nausea or vomiting or diarrhea. States fair appetite  Minimal cough, productive at times. No fever. 60 L/min O2 via bipap    Review of systems:  As stated above in the chief complaint, otherwise negative. Medications:  Scheduled Meds:   influenza virus vaccine  0.5 mL Intramuscular Prior to discharge    dexamethasone  6 mg Oral 2 times per day    levofloxacin  750 mg Intravenous Q24H    remdesivir IVPB  100 mg Intravenous Q24H    sodium chloride flush  10 mL Intravenous 2 times per day    vitamin C  1,000 mg Oral BID    zinc sulfate  50 mg Oral BID    vitamin D3  400 Units Oral Daily     Continuous Infusions:   heparin (PORCINE) Infusion 15.406 Units/kg/hr (20 0515)     PRN Meds:sodium chloride, sodium chloride flush, acetaminophen **OR** acetaminophen, polyethylene glycol, promethazine **OR** ondansetron, heparin (porcine), heparin (porcine)    OBJECTIVE:  /70   Pulse 100   Temp 97.7 °F (36.5 °C) (Oral)   Resp 30   Ht 5' 8\" (1.727 m)   Wt 233 lb 5 oz (105.8 kg)   SpO2 (!) 88%   BMI 35.48 kg/m²   Temp  Av.3 °F (36.3 °C)  Min: 96.9 °F (36.1 °C)  Max: 97.7 °F (36.5 °C)  Constitutional: The patient is awake, alert, and oriented. 90% PAP. Skin: diaphoretic. No rashes were noted. HEENT: Round and reactive pupils. Moist mucous membranes. No ulcerations or thrush. Neck: Supple to movements. Chest: labored respirations. Course rhonchi b/l. Cardiovascular: S1 and S2 are rhythmic and regular. No murmurs appreciated. Abdomen: Positive bowel sounds to auscultation. Benign to palpation. Extremities: No edema.   Lines: peripheral    Laboratory and Tests Review:  Lab Results Component Value Date    WBC 14.5 (H) 11/14/2020    WBC 14.0 (H) 11/13/2020    WBC 11.9 (H) 11/12/2020    HGB 14.2 11/14/2020    HCT 42.2 11/14/2020    MCV 84.4 11/14/2020     11/14/2020     Lab Results   Component Value Date    NEUTROABS 12.31 (H) 11/14/2020    NEUTROABS 12.04 (H) 11/13/2020    NEUTROABS 10.23 (H) 11/12/2020     No results found for: [facility]  Lab Results   Component Value Date    ALT 79 (H) 11/14/2020    AST 48 (H) 11/14/2020    ALKPHOS 132 (H) 11/14/2020    BILITOT 0.7 11/14/2020     Lab Results   Component Value Date     11/14/2020    K 3.8 11/14/2020     11/14/2020    CO2 22 11/14/2020    BUN 32 11/14/2020    CREATININE 1.2 11/14/2020    CREATININE 1.6 11/13/2020    CREATININE 1.9 11/12/2020    GFRAA >60 11/14/2020    LABGLOM >60 11/14/2020    GLUCOSE 177 11/14/2020    GLUCOSE 154 02/16/2012    PROT 7.0 11/14/2020    LABALBU 2.8 11/14/2020    CALCIUM 8.4 11/14/2020    BILITOT 0.7 11/14/2020    ALKPHOS 132 11/14/2020    AST 48 11/14/2020    ALT 79 11/14/2020     Lab Results   Component Value Date    CRP 27.8 (H) 11/12/2020    CRP 23.0 (H) 11/11/2020     Lab Results   Component Value Date    SEDRATE 80 (H) 11/12/2020     Radiology:      Microbiology:   Streptococcus pneumoniae/Legionella urine Ag: pending  Blood cultures 11/11/2020: Negative so far  Respiratory culture ordered but not sent    Recent Labs     11/11/20  1601 11/12/20  1603   PROCAL 0.79* 0.64*       ASSESSMENT:  · Moderate to severe SARS-CoV-2 infection with pneumonia  · Lymphopenia secondary to SARS-CoV-2 infection  · Leukocytosis associated to the above and steroids  · Probable community-acquired pneumonia    PLAN:  · Continue Levofloxacin  · Continue Remdesivir, day 3  · Continue Dexamethasone  · Check final cultures  · On heparin gtt  · Monitor labs. Creat/lfts improving      April Ki Batter, CNP  9:29 AM  11/14/2020    Pt seen and examined.  I discussed and co-formulated the plan with advanced practice nurse. Labs, cultures, and radiographs reviewed. Face to Face encounter occurred. Changes made as necessary by me.      Maribel Manuel MD  Infectious Disease

## 2020-11-14 NOTE — PROGRESS NOTES
[facility] Progress Note    Admitting Date and Time: 11/11/2020  2:12 PM  Admit Dx: Hypoxia [R09.02]  Hypoxia [R09.02]    Subjective:  Patient is being followed for Hypoxia [R09.02]  Hypoxia [R09.02]   Pt feels some improvement with his breathing, he was desaturating on 15 L HF NC to the mid 80s  currently on 60 L with 100% FiO2      ROS: denies fever, chills, cp, n/v, HA unless stated above.       influenza virus vaccine  0.5 mL Intramuscular Prior to discharge    dexamethasone  6 mg Oral 2 times per day    levofloxacin  750 mg Intravenous Q24H    remdesivir IVPB  100 mg Intravenous Q24H    sodium chloride flush  10 mL Intravenous 2 times per day    vitamin C  1,000 mg Oral BID    zinc sulfate  50 mg Oral BID    vitamin D3  400 Units Oral Daily     sodium chloride, 30 mL, PRN  sodium chloride flush, 10 mL, PRN  acetaminophen, 650 mg, Q6H PRN    Or  acetaminophen, 650 mg, Q6H PRN  polyethylene glycol, 17 g, Daily PRN  promethazine, 12.5 mg, Q6H PRN    Or  ondansetron, 4 mg, Q6H PRN  heparin (porcine), 4,000 Units, PRN  heparin (porcine), 2,000 Units, PRN         Objective:    /70   Pulse 100   Temp 97.7 °F (36.5 °C) (Oral)   Resp 30   Ht 5' 8\" (1.727 m)   Wt 233 lb 5 oz (105.8 kg)   SpO2 (!) 88%   BMI 35.48 kg/m²     General Appearance: alert and oriented to person, place and time and in no acute distress  Skin: warm and dry  Head: normocephalic and atraumatic  Eyes: pupils equal, round, and reactive to light, extraocular eye movements intact, conjunctivae normal  Neck: neck supple and non tender without mass   Pulmonary/Chest: diminished  bilaterally- no wheezes, rales or rhonchi, normal air movement  Cardiovascular: normal rate, normal S1 and S2 and no carotid bruits  Abdomen: soft, non-tender, non-distended, normal bowel sounds, no masses or organomegaly  Extremities: no cyanosis, no clubbing and no edema  Neurologic: no cranial nerve deficit and speech

## 2020-11-14 NOTE — PLAN OF CARE
Problem:  Body Temperature -  Risk of, Imbalanced  Goal: Ability to maintain a body temperature within defined limits  11/14/2020 1136 by Shelbi Rosado RN  Outcome: Met This Shift  11/13/2020 2343 by Angelina Conway RN  Outcome: Not Met This Shift  Goal: Will regain or maintain usual level of consciousness  11/14/2020 1136 by Shelbi Rosado RN  Outcome: Met This Shift  11/13/2020 2343 by Angelina Conway RN  Outcome: Met This Shift  Goal: Complications related to the disease process, condition or treatment will be avoided or minimized  11/14/2020 1136 by Shelbi Rosado RN  Outcome: Met This Shift  11/13/2020 2343 by Angelina Conway RN  Outcome: Not Met This Shift     Problem: Isolation Precautions - Risk of Spread of Infection  Goal: Prevent transmission of infection  11/14/2020 1136 by Shelbi Rosado RN  Outcome: Met This Shift  11/13/2020 2343 by Angelina Conway RN  Outcome: Met This Shift     Problem: Risk for Fluid Volume Deficit  Goal: Maintain normal heart rhythm  11/13/2020 2343 by Angelina Conway RN  Outcome: Met This Shift  Goal: Maintain absence of muscle cramping  11/13/2020 2343 by Angelina Conway RN  Outcome: Met This Shift  Goal: Maintain normal serum potassium, sodium, calcium, phosphorus, and pH  11/13/2020 2343 by Angelina Conway RN  Outcome: Met This Shift     Problem: Patient Education: Go to Patient Education Activity  Goal: Patient/Family Education  11/13/2020 2343 by Angelina Conway RN  Outcome: Met This Shift     Problem: Falls - Risk of:  Goal: Will remain free from falls  11/13/2020 2343 by Angelina Conway RN  Outcome: Met This Shift  Goal: Absence of physical injury  11/13/2020 2343 by Angelina Conway RN  Outcome: Met This Shift     Problem: Pain:  Goal: Pain level will decrease  11/13/2020 2343 by Angelina Conway RN  Outcome: Met This Shift  Goal: Control of acute pain  11/13/2020 2343 by Angelina Conway RN  Outcome: Met This Shift

## 2020-11-15 NOTE — PROGRESS NOTES
.Patient admitted from 18 to 206, with the following belongings 1 backpack 1 pair of black slip shoes, 1 pj pants, 1 sweat pants, 4 pairs of underwear, 4 pairs of socks 2 lorrie shirts, 3 tanks tops, 1 cell phone with , 2 toothbrushes with toothpaste and lotion. 1 yellow colored necklace with cross and 1 yellow colored hoop earring removed from patient and placed in specimen cup and cup placed in backpack. Patient placed on monitor, patient oriented to room and unit visiting hours. Patient guide at bedside, reviewed patient rights and responsibilities. MRSA nasal swab obtained. Bed alarm on. Call light within reach.

## 2020-11-15 NOTE — PROCEDURES
Intubation Procedure Note    Indication: Respiratory failure and hypoxia    Consent: The patient provided verbal consent for this procedure, family provided verbal consent as well. Medications Used: 20 mg etomidate intravenously, midazolam intravenously, fentanyl intravenously, 100 mg succinycholine intravenously and propofol intravenously (Initially versed, fentanyl, propofol bolus used without satisfactory sedation, etomidate and succinylcholine were then administered)    Procedure: The patient was placed in the appropriate position. Cricoid pressure was not required. Intubation was performed by indirect laryngoscopy using a GlideScope and an 8.0 cuffed endotracheal tube. The cuff was then inflated and the tube was secured appropriately at a distance of 25 cm to the dental ridge. Initial confirmation of placement included bilateral breath sounds, an end tidal CO2 detector, absence of sounds over the stomach, tube fogging, adequate chest rise and adequate pulse oximetry reading. A chest x-ray to verify correct placement of the tube has been ordered but is still pending. The patient tolerated the procedure well. Complications: None    Intubation performed under direct supervision of Dr. Mcknight Laws    ========================================  Central Line Placement Procedure Note    Indication: vascular access, poor peripheral access, central venous monitoring, centrally administered medications and need for frequent blood draws    Consent: The family provided verbal consent for this procedure. Procedure: The patient was positioned appropriately and the skin over the right internal jugular vein was prepped with Chloraprep and draped in a sterile fashion. Local anesthesia was not performed due to adequate general sedation. A large bore needle was used to identify the vein under ultrasound guidance. A guide wire was then inserted into the vein through the needle.  A triple lumen catheter was then inserted into the vessel over the guide wire using the Seldinger technique. All ports showed good, free flowing blood return and were flushed with saline solution. The catheter was then securely fastened to the skin with suture at 15 cm. Two sutures were placed into the kit included tube clamp and proximal eyelets, 4 total sutures. An antibiotic disk was placed and the site was then covered with a sterile dressing. A post procedure X-ray was ordered and is still pending at this time. The patient tolerated the procedure well. Complications: None    -------------------------------------------------------------------    Arterial Line Placement Procedure Note                     Indication: Need for serial blood work, arterial blood gases, mechanical ventilation, and pulmonary disease    Consent: The family provided verbal consent for this procedure. Procedure: The skin over the right radial artery was prepped with Chloraprep and draped in a sterile fashion. Local anesthesia was not performed due to adequate general sedation. A 20 gauge arterial line catheter was then inserted, using a modified Seldinger technique, into the vessel. The transducer set was then attached and securely fastened to the skin with sutures. Waveforms on the monitor were observed and found to be adequate. The patient had good distal perfusion after the procedure. The site was then dressed in a sterile fashion. The patient tolerated the procedure well. Complications: None      Was present at the bedside and supervised Dr. Clara Vora throughout the procedures.   Charlie Haynes MD

## 2020-11-15 NOTE — CONSULTS
Critical Care Admit/Consult Note         Patient Valentine Urrutia   MRN -  [id number]   Fernandez # - [de-identified]   - 1955      Date of Admission -  2020  2:12 PM  Date of evaluation -  11/15/2020  0206/0206-A   Hospital Day - 4          ADMIT/CONSULT DETAILS     Reason for Admit/Consult   Acute hypoxic respiratory failure secondary to COVID-19 pneumonia    Consulting Danilo Deleon MD  Primary Care Physician - No primary care provider on file. HPI     History is gathered from medical records, patient was in respiratory distress on BiPAP, emergently being intubated on arrival in the ICU. Marysol Manley is a 77 y.o. arrived to the ED  with fever, nausea, vomiting. He reports the symptoms began 6 days prior. Loss of appetite red green-white productive sputum, urinary frequency, diarrhea rhinorrhea, nasal congestion chest pain right upper quadrant abdominal pain. He had been on a Z-Michael from his primary physician no improvement in symptoms he complains of right sided pleuritic chest pain with cough. ABG within normal range, leukocytosis, lymphopenia elevated D-dimer greater than 5250 he was started on heparin drip, ferritin 1710 and admitted await ID consult for treatment will need remdesivir, infectious work-up. He arrived hypoxic was placed on 15 L nonrebreather he denies history of PEs or DVTs     Patient was admitted to the hospital, was on BiPAP, OptiFlow. On 11/15/2020, patient was unable to obtain O2 sat above upper 80s, was in respiratory distress with respiratory rate of 40-50/min. Because of his increasing respiratory distress on BiPAP, patient was transferred to ICU, intubated. He has been followed by pulmonology, infectious disease throughout hospital stay. Past Medical History         Diagnosis Date    Palpitations         Past Surgical History     History reviewed. No pertinent surgical history.     Current Medications   Current Medications    %  SpO2/FiO2 ratio: 92  Sensitivity: 3  PEEP/CPAP: 14  I Time/ I Time %: 0 s  Humidification Source: Heated wire  Humidification Temp: 37  Humidification Temp Measured: 37  Mask Type: Full face mask  Mask Size: Medium  Additional Respiratory  Assessments  Pulse: 130  Resp: 19  SpO2: 92 %  Humidification Source: Heated wire  Humidification Temp: 37  Oral Care: Teeth brushed, Mouthwash    ABG  Lab Results   Component Value Date    PH 7.205 11/15/2020    PCO2 72.7 11/15/2020    PO2 77.1 11/15/2020    HCO3 28.1 11/15/2020    O2SAT 91.2 11/15/2020     Lab Results   Component Value Date    MODE AC 11/15/2020         Vitals    height is 5' 8\" (1.727 m) and weight is 233 lb 5 oz (105.8 kg). His axillary temperature is 97.5 °F (36.4 °C). His blood pressure is 152/82 (abnormal) and his pulse is 130. His respiration is 19 and oxygen saturation is 92%. Temperature Range: Temp: 97.5 °F (36.4 °C) Temp  Av.9 °F (36.6 °C)  Min: 97.5 °F (36.4 °C)  Max: 98.3 °F (36.8 °C)  BP Range:  Systolic (09RVB), MOX:459 , Min:140 , KXJ:236     Diastolic (24HCY), GSZ:66, Min:76, Max:90    Pulse Range: Pulse  Av.4  Min: 103  Max: 135  Respiration Range: Resp  Av.9  Min: 19  Max: 48  Current Pulse Ox[de-identified]  SpO2: 92 %  24HR Pulse Ox Range:  SpO2  Av.2 %  Min: 87 %  Max: 93 %  Oxygen Amount and Delivery: O2 Flow Rate (L/min): 60 L/min    I/O (24 Hours)    Patient Vitals for the past 8 hrs:   BP Pulse Resp SpO2   11/15/20 1702 -- 130 19 92 %   11/15/20 1330 -- 135 23 93 %   11/15/20 1143 (!) 152/82 135 (!) 48 (!) 87 %       Intake/Output Summary (Last 24 hours) at 11/15/2020 1755  Last data filed at 11/15/2020 1143  Gross per 24 hour   Intake 50 ml   Output 750 ml   Net -700 ml     I/O last 3 completed shifts:   In: 48 [P.O.:50]  Out: 1100 [Urine:1100]   Date 11/15/20 0000 - 11/15/20 2359   Shift 3328-1830 1131-0148 9388-7369 24 Hour Total   INTAKE   P.O.(mL/kg/hr)  50(0.1)  50   Shift Total(mL/kg)  50(0.5)  50(0.5)   OUTPUT Urine(mL/kg/hr)  750(0.9)  750   Shift Total(mL/kg)  750(7.1)  750(7.1)   Weight (kg) 105.8 105.8 105.8 105.8     No data found.       Exam   PHYSICAL EXAM: (post intubation)  General: Intubated, sedated  Eyes: conjunctivae/corneas clear, sclera non icteric  Ears: no obvious scars, no lesions, no masses  Mouth: mucous membranes moist, no obvious oral sores  Head: normocephalic, atraumatic  Neck: no JVD, no adenopathy, no thyromegaly, neck is supple, trachea is midline  Lungs: clear to auscultation bilaterally, no wheezing, or rales  Heart: tachycardic, regular rhythm, no murmur, normal S1, S2  Abdomen: soft, non-tender; bowel sounds normal; no masses, no organomegaly  Extremities: no lower extremity edema, extremities atraumatic, no cyanosis, no clubbing, 2+ pedal pulses palpated  Skin: normal color, normal texture, normal turgor, no rashes, no lesions  Neurologic: unable to assess    Data   Old records and images have been reviewed    Lab Results   CBC     Lab Results   Component Value Date    WBC 19.1 11/15/2020    RBC 5.41 11/15/2020    HGB 14.9 11/15/2020    HCT 47.3 11/15/2020     11/15/2020    MCV 87.4 11/15/2020    MCH 27.5 11/15/2020    MCHC 31.5 11/15/2020    RDW 14.8 11/15/2020    NRBC 1.0 11/15/2020    METASPCT 1.0 11/11/2020    LYMPHOPCT 2.0 11/15/2020    MONOPCT 10.0 11/15/2020    MYELOPCT 1.0 11/15/2020    BASOPCT 0.0 11/15/2020    MONOSABS 1.91 11/15/2020    LYMPHSABS 0.38 11/15/2020    EOSABS 0.00 11/15/2020    BASOSABS 0.00 11/15/2020       BMP   Lab Results   Component Value Date     11/15/2020    K 3.7 11/15/2020     11/15/2020    CO2 29 11/15/2020    BUN 21 11/15/2020    CREATININE 1.2 11/15/2020    GLUCOSE 172 11/15/2020    GLUCOSE 154 02/16/2012    CALCIUM 8.6 11/15/2020       LFTS  Lab Results   Component Value Date    ALKPHOS 142 11/15/2020    ALT 72 11/15/2020    AST 58 11/15/2020    PROT 7.9 11/15/2020    BILITOT 1.1 11/15/2020    LABALBU 3.2 11/15/2020       INR  No results for input(s): PROTIME, INR in the last 72 hours. APTT  Recent Labs     11/13/20  1200 11/14/20  1640 11/15/20  1330   APTT 51.8* 65.2* 34.3       Lactic Acid  No results found for: LACTA     BNP   No results for input(s): BNP in the last 72 hours. Cultures   No results for input(s): BC in the last 72 hours. No results for input(s): Shelbie Holes in the last 72 hours. No results for input(s): LABURIN in the last 72 hours. Radiology   11/15/2020  XR CHEST PORTABLE   Final Result   Support devices in good position. Persistent interstitial and airspace   opacities in both lungs. XR ABDOMEN FOR NG/OG/NE TUBE PLACEMENT   Final Result   Support devices in good position. Persistent interstitial and airspace   opacities in both lungs. CT CHEST WO CONTRAST   Final Result   Multifocal confluent and ground-glass airspace disease in the lungs   bilaterally most consistent with multifocal pneumonia. Emphysematous changes in the lungs with peripheral bulla. Nonspecific mediastinal nodes, which may be reactive. Limited evaluation without intravenous contrast.         CT ABDOMEN PELVIS WO CONTRAST Additional Contrast? None   Final Result   No evidence of acute intra-abdominal pathology. XR CHEST PORTABLE   Final Result   1. Significant bilateral multifocal pulmonary infiltrates         XR CHEST PORTABLE    (Results Pending)         SYSTEMS ASSESSMENT    Neuro   Intubated, sedated, no acute issues    Respiratory   Acute hypoxic respiratory failure secondary to COVID-19 pneumonia  -Was initially on BiPAP, was intubated 11/15  -ID following, currently on remdesivir  -Currently on levofloxacin for presumed community-acquired pneumonia superimposed on COVID-19 PNA    -ABG after intubation markable for acute respiratory acidosis, PF ratio of 0.77. Will prone patient, increase respiratory rate.     Cardiovascular   No acute issues, mild hypotension, will wean propofol gtt, give 500 cc fluid bolus, add Silas-Synephrine if necessary to maintain MAP >65 mmhg given tachycardia of 128. Gastrointestinal   No acute issues    Renal   No acute issues     Infectious Disease   COVID-19 pneumonia  On Decadron, remdesivir  Presumed community-acquired pneumonia, followed by ID, on levofloxacin    Hematology/Oncology   On heparin gtt    Endocrine   No acute issues    Social/Spiritual/DNR/Other   Full code    ______________________________________________________________________    ASSESSMENT/ PLAN   1. As above  2. Prone patient, increase respiratory rate repeat ABG in 6 hours  3. Patient becoming mildly hypotensive, will switch from propofol to Versed drip., give fluid bolus, add Silas-Synephrine as needed to maintain MAP > 65 mmhg. 4. GI prophylaxis - protonix  5. DVT Prophylaxis - heparin gtt  6. Discuss case and plan with attending, Dr. Gavin Chan,   Resident, PGY-1  11/15/2020  5:55 PM      I personally saw, examined and provided care for the patient. Radiographs, labs and medication list were reviewed by me independently. I spoke with bedside nursing, therapists and consultants. Critical care services and times documented are independent of procedures and multidisciplinary rounds with Residents. Additionally comprehensive, multidisciplinary rounds were conducted with the MICU team. The case was discussed in detail and plans for care were established. Review of Residents documentation was conducted and revisions were made as appropriate. I agree with the above documented exam, problem list and plan of care.   Марина Fung MD   CCT excluding procedures:45'

## 2020-11-15 NOTE — PROGRESS NOTES
more NIV to keep his saturations above 92%. 2. Continue remdesivir day 3 Levaquin per ID   3. Continue Decadron 6 mg twice daily  4. Continue heparin drip  5. Continue vitamin C and zinc  6. Encourage patient to self prone  7. LFTs are slowly trending down  8.  We will add duo nebs  9. Reassurance given to patient        Electronically signed by Celeste Duncan MD on 11/14/2020 at 7:41 PM

## 2020-11-15 NOTE — PROGRESS NOTES
Nurse to nurse report called to Aliya Leon in ICU. Lab labels,morning medications, and patient belongs transported with patient.

## 2020-11-15 NOTE — PROGRESS NOTES
[facility] Progress Note    Admitting Date and Time: 11/11/2020  2:12 PM  Admit Dx: Hypoxia [R09.02]  Hypoxia [R09.02]    Subjective:  Patient is being followed for Hypoxia [R09.02]  Hypoxia [R09.02]   Pt feels some improvement with his breathing, he was desaturating on 15 L HF NC to the mid 80s  currently on 60 L with 100% FiO2 and oxygen saturation in the upper 80s yet he is breathing 40 to 50  A minute      ROS: denies fever, chills, cp, n/v, HA unless stated above.       influenza virus vaccine  0.5 mL Intramuscular Prior to discharge    dexamethasone  6 mg Oral 2 times per day    levofloxacin  750 mg Intravenous Q24H    remdesivir IVPB  100 mg Intravenous Q24H    sodium chloride flush  10 mL Intravenous 2 times per day    vitamin C  1,000 mg Oral BID    zinc sulfate  50 mg Oral BID    vitamin D3  400 Units Oral Daily     sodium chloride, 30 mL, PRN  sodium chloride flush, 10 mL, PRN  acetaminophen, 650 mg, Q6H PRN    Or  acetaminophen, 650 mg, Q6H PRN  polyethylene glycol, 17 g, Daily PRN  promethazine, 12.5 mg, Q6H PRN    Or  ondansetron, 4 mg, Q6H PRN  heparin (porcine), 4,000 Units, PRN  heparin (porcine), 2,000 Units, PRN         Objective:    BP (!) 141/90   Pulse 124   Temp 97.5 °F (36.4 °C) (Axillary)   Resp (!) 38   Ht 5' 8\" (1.727 m)   Wt 233 lb 5 oz (105.8 kg)   SpO2 91%   BMI 35.48 kg/m²     General Appearance: alert and oriented to person, place and time and in no acute distress  Skin: warm and dry  Head: normocephalic and atraumatic  Eyes: pupils equal, round, and reactive to light, extraocular eye movements intact, conjunctivae normal  Neck: neck supple and non tender without mass   Pulmonary/Chest: diminished  bilaterally- no wheezes, rales or rhonchi, normal air movement  Cardiovascular: normal rate, normal S1 and S2 and no carotid bruits  Abdomen: soft, non-tender, non-distended, normal bowel sounds, no masses or organomegaly  Extremities: no cyanosis, no clubbing and no edema  Neurologic: no cranial nerve deficit and speech normal        Recent Labs     11/13/20  0215 11/14/20  0508    140   K 3.7 3.8    103   CO2 24 22   BUN 35* 32*   CREATININE 1.6* 1.2   GLUCOSE 178* 177*   CALCIUM 8.3* 8.4*       Recent Labs     11/13/20  0215 11/14/20  0508   WBC 14.0* 14.5*   RBC 4.81 5.00   HGB 13.4 14.2   HCT 41.7 42.2   MCV 86.7 84.4   MCH 27.9 28.4   MCHC 32.1 33.6   RDW 14.8 14.8    356   MPV 10.7 10.6         Assessment:    Active Problems:    Hypoxia  Resolved Problems:    * No resolved hospital problems. *      Plan:  1. Acute hypoxic respiratory failure due to COVID-19 pneumonia, presented with oxygen saturation 76% on room air, requiring Vapotherm 60 L 100% FiO2 to an oxygen saturation still in the upper 80s, patient is very labored in breathing, patient does not need to be intubated, I contacted the intensivist, will transfer the patient to the intensive care unit for possible intubation. 2.  Acute COVID-19 pneumonia, start the patient on Decadron, ID was consulted for remdesivir. Patient might be in cytokine storm, I contacted oncology patient might need to be started on Tocilizumab  3. Superimposed bacterial pneumonia, procalcitonin is elevated at 0.6, added levaquin  4. Suspecting cytokine storm with elevated D-dimer, started full dose anticoagulation. 5.  Acute renal failure, presented with creatinine of 2.4 not sure about the patient's baseline he might have CKD. Patient received IV fluids in the ED and creatinine trended down to 1.2. Telemetry monitoring consider consulting nephrology. 6.  Elevated transaminases, most likely due to COVID-19 infection and possible cytokine storm, trending down, continue to monitor. I have spent 32  minutes in critical care time excluding independently billable procedures      NOTE: This report was transcribed using voice recognition software.  Every effort was made to ensure accuracy; however, inadvertent computerized transcription errors may be present.   Electronically signed by Lobo Dumont MD on 11/15/2020 at 9:13 AM

## 2020-11-15 NOTE — PROGRESS NOTES
Intubation Procedure Note    Indication: Respiratory failure and hypoxia    Consent: The patient provided verbal consent for this procedure, family provided verbal consent as well. Medications Used: 20 mg etomidate intravenously, midazolam intravenously, fentanyl intravenously, 100 mg succinycholine intravenously and propofol intravenously (Initially versed, fentanyl, propofol bolus used without satisfactory sedation, etomidate and succinylcholine were then administered)    Procedure: The patient was placed in the appropriate position. Cricoid pressure was not required. Intubation was performed by indirect laryngoscopy using a GlideScope and an 8.0 cuffed endotracheal tube. The cuff was then inflated and the tube was secured appropriately at a distance of 25 cm to the dental ridge. Initial confirmation of placement included bilateral breath sounds, an end tidal CO2 detector, absence of sounds over the stomach, tube fogging, adequate chest rise and adequate pulse oximetry reading. A chest x-ray to verify correct placement of the tube has been ordered but is still pending. The patient tolerated the procedure well. Complications: None    ========================================  Central Line Placement Procedure Note    Indication: vascular access, poor peripheral access, central venous monitoring, centrally administered medications and need for frequent blood draws    Consent: The family provided verbal consent for this procedure. Procedure: The patient was positioned appropriately and the skin over the right internal jugular vein was prepped with Chloraprep and draped in a sterile fashion. Local anesthesia was not performed due to adequate general sedation. A large bore needle was used to identify the vein under ultrasound guidance. A guide wire was then inserted into the vein through the needle.  A triple lumen catheter was then inserted into the vessel over the guide wire using the Seldinger technique. All ports showed good, free flowing blood return and were flushed with saline solution. The catheter was then securely fastened to the skin with suture at 15 cm. Two sutures were placed into the kit included tube clamp and proximal eyelets, 4 total sutures. An antibiotic disk was placed and the site was then covered with a sterile dressing. A post procedure X-ray was ordered and is still pending at this time. The patient tolerated the procedure well. Complications: None    -------------------------------------------------------------------    Arterial Line Placement Procedure Note                     Indication: Need for serial blood work, arterial blood gases, mechanical ventilation, and pulmonary disease    Consent: The family provided verbal consent for this procedure. Procedure: The skin over the right radial artery was prepped with Chloraprep and draped in a sterile fashion. Local anesthesia was not performed due to adequate general sedation. A 20 gauge arterial line catheter was then inserted, using a modified Seldinger technique, into the vessel. The transducer set was then attached and securely fastened to the skin with sutures. Waveforms on the monitor were observed and found to be adequate. The patient had good distal perfusion after the procedure. The site was then dressed in a sterile fashion. The patient tolerated the procedure well.      Complications: None

## 2020-11-15 NOTE — PROGRESS NOTES
Date: 11/15/2020    Time: 12:19 AM    Patient Placed On BIPAP/CPAP/ Non-Invasive Ventilation? Yes    If no must comment. Facial area red/color change? No           If YES are Blister/Lesion present? No   If yes must notify nursing staff  BIPAP/CPAP skin barrier?   Yes    Skin barrier type:mepilexlite       Comments:        Laura Valles

## 2020-11-15 NOTE — PROGRESS NOTES
[address] [place] [address] [facility]  [facility]  Progress Note    SUBJECTIVE:  Chief Complaint   Patient presents with    Shortness of Breath     76% room air at arrival     Fever    Emesis     The patient is tolerating medications without any side effects. Worsened breathing, sob on bipap  No nausea or vomiting or diarrhea. Unable to eat d/t inability to come off bipap  Minimal cough  No fever. bipap 100% FiO2    Review of systems:  As stated above in the chief complaint, otherwise negative. Medications:  Scheduled Meds:   influenza virus vaccine  0.5 mL Intramuscular Prior to discharge    dexamethasone  6 mg Oral 2 times per day    levofloxacin  750 mg Intravenous Q24H    remdesivir IVPB  100 mg Intravenous Q24H    sodium chloride flush  10 mL Intravenous 2 times per day    vitamin C  1,000 mg Oral BID    zinc sulfate  50 mg Oral BID    vitamin D3  400 Units Oral Daily     Continuous Infusions:   heparin (PORCINE) Infusion 15.406 Units/kg/hr (20 0515)     PRN Meds:sodium chloride, sodium chloride flush, acetaminophen **OR** acetaminophen, polyethylene glycol, promethazine **OR** ondansetron, heparin (porcine), heparin (porcine)    OBJECTIVE:  BP (!) 141/90   Pulse 124   Temp 97.5 °F (36.4 °C) (Axillary)   Resp (!) 38   Ht 5' 8\" (1.727 m)   Wt 233 lb 5 oz (105.8 kg)   SpO2 91%   BMI 35.48 kg/m²   Temp  Av °F (36.7 °C)  Min: 97.5 °F (36.4 °C)  Max: 98.3 °F (36.8 °C)  Constitutional: The patient is awake, alert, and oriented. In respiratory distress  Skin: diaphoretic. No rashes were noted. HEENT: Round and reactive pupils. Moist mucous membranes. No ulcerations or thrush. Neck: Supple to movements. Chest: very labored respirations. Course rhonchi/rales b/l. Cardiovascular: S1 and S2 are rhythmic and regular. No murmurs appreciated. Abdomen: Positive bowel sounds to auscultation. Benign to palpation. Extremities: No edema.   Lines: peripheral    Laboratory and Tests Review:  Lab Results   Component Value Date    WBC 14.5 (H) 11/14/2020    WBC 14.0 (H) 11/13/2020    WBC 11.9 (H) 11/12/2020    HGB 14.2 11/14/2020    HCT 42.2 11/14/2020    MCV 84.4 11/14/2020     11/14/2020     Lab Results   Component Value Date    NEUTROABS 12.31 (H) 11/14/2020    NEUTROABS 12.04 (H) 11/13/2020    NEUTROABS 10.23 (H) 11/12/2020     No results found for: [facility]  Lab Results   Component Value Date    ALT 79 (H) 11/14/2020    AST 48 (H) 11/14/2020    ALKPHOS 132 (H) 11/14/2020    BILITOT 0.7 11/14/2020     Lab Results   Component Value Date     11/14/2020    K 3.8 11/14/2020     11/14/2020    CO2 22 11/14/2020    BUN 32 11/14/2020    CREATININE 1.2 11/14/2020    CREATININE 1.6 11/13/2020    CREATININE 1.9 11/12/2020    GFRAA >60 11/14/2020    LABGLOM >60 11/14/2020    GLUCOSE 177 11/14/2020    GLUCOSE 154 02/16/2012    PROT 7.0 11/14/2020    LABALBU 2.8 11/14/2020    CALCIUM 8.4 11/14/2020    BILITOT 0.7 11/14/2020    ALKPHOS 132 11/14/2020    AST 48 11/14/2020    ALT 79 11/14/2020     Lab Results   Component Value Date    CRP 27.8 (H) 11/12/2020    CRP 23.0 (H) 11/11/2020     Lab Results   Component Value Date    SEDRATE 80 (H) 11/12/2020     Radiology:      Microbiology:   Streptococcus pneumoniae/Legionella urine Ag: neg  Blood cultures 11/11/2020: Negative  Respiratory culture ordered but not sent    Recent Labs     11/12/20  1603   PROCAL 0.64*       ASSESSMENT:  · Moderate to severe SARS-CoV-2 infection with pneumonia  · Lymphopenia secondary to SARS-CoV-2 infection  · Leukocytosis associated to the above and steroids  · Probable community-acquired pneumonia    PLAN:  · Continue Levofloxacin  · Continue Remdesivir, day 4  · Continue Dexamethasone  · On heparin gtt  · Monitor labs.  Creat/lfts improving  · D/w pt - is amenable to intubation if necessary  · D/w Dr. Nir Graham x1      April Sanaz Scales, CNP  9:48 AM  11/15/2020    Intubated now  Overnight worsening, was on heparin drip already, check procalcitonin again, ok with toci, CXR noted    Pt seen and examined. I discussed and co-formulated the plan with advanced practice nurse. Labs, cultures, and radiographs reviewed. Face to Face encounter occurred. Changes made as necessary by me.      Christal Rivera MD  Infectious Disease

## 2020-11-15 NOTE — PROGRESS NOTES
Attempt to take off BiPAP and placed on optiflow to eat breakfast. HR increased to 130's-140's, RR 38, SpO2 75%. Pt placed back on BiPAP, 's, RR 35, SpO2 91%. Pt complaining of increased work of breathing. Continuous pulse ox on and functioning. Unable to give PO medications at this time.

## 2020-11-16 NOTE — PROGRESS NOTES
[facility] Progress Note    Admitting Date and Time: 11/11/2020  2:12 PM  Admit Dx: Hypoxia [R09.02]  Hypoxia [R09.02]    Subjective:  Patient is being followed for Hypoxia [R09.02]  Hypoxia [R09.02]   Pt is intubated and paralyzed, pronated. Still hypoxic and hypotensive on pressors    ROS: denies fever, chills, cp, n/v, HA unless stated above.       pantoprazole  40 mg Intravenous Daily    And    sodium chloride (PF)  10 mL Intravenous Daily    insulin lispro  0-6 Units Subcutaneous 4 times per day    enoxaparin  110 mg Subcutaneous BID    chlorhexidine  15 mL Mouth/Throat BID    influenza virus vaccine  0.5 mL Intramuscular Prior to discharge    dexamethasone  6 mg Oral 2 times per day    levofloxacin  750 mg Intravenous Q24H    sodium chloride flush  10 mL Intravenous 2 times per day    vitamin C  1,000 mg Oral BID    zinc sulfate  50 mg Oral BID    vitamin D3  400 Units Oral Daily     glucose, 15 g, PRN  dextrose, 12.5 g, PRN  glucagon (rDNA), 1 mg, PRN  dextrose, 100 mL/hr, PRN  sodium chloride, 30 mL, PRN  sodium chloride flush, 10 mL, PRN  acetaminophen, 650 mg, Q6H PRN    Or  acetaminophen, 650 mg, Q6H PRN  polyethylene glycol, 17 g, Daily PRN  promethazine, 12.5 mg, Q6H PRN    Or  ondansetron, 4 mg, Q6H PRN  heparin (porcine), 4,000 Units, PRN  heparin (porcine), 2,000 Units, PRN         Objective:    BP (!) 92/56   Pulse 97   Temp 97.2 °F (36.2 °C) (Bladder)   Resp 29   Ht 5' 8\" (1.727 m)   Wt 233 lb 5 oz (105.8 kg)   SpO2 98%   BMI 35.48 kg/m²     General Appearance: intubated and paralyzed, pronated  Skin: warm and dry  Head: normocephalic and atraumatic  Eyes: pupils equal, round, and reactive to light, extraocular eye movements intact, conjunctivae normal  Neck: neck supple and non tender without mass   Pulmonary/Chest: diminished  bilaterally- no wheezes, rales or rhonchi, normal air movement  Cardiovascular: normal rate, prontaed  Extremities: no cyanosis, no clubbing and no edema  Neurologic: intubated and paralyzed        Recent Labs     11/14/20  0508 11/15/20  1330 11/16/20  0609    141 140   K 3.8 3.7 4.7    100 105   CO2 22 29 28   BUN 32* 21 18   CREATININE 1.2 1.2 1.0   GLUCOSE 177* 172* 226*   CALCIUM 8.4* 8.6 8.0*       Recent Labs     11/14/20  0508 11/15/20  1330 11/16/20  0609   WBC 14.5* 19.1* 16.4*   RBC 5.00 5.41 4.85   HGB 14.2 14.9 13.5   HCT 42.2 47.3 42.5   MCV 84.4 87.4 87.6   MCH 28.4 27.5 27.8   MCHC 33.6 31.5* 31.8*   RDW 14.8 14.8 15.2*    454* 433   MPV 10.6 11.2 10.6         Assessment:    Active Problems:    Hypoxia  Resolved Problems:    * No resolved hospital problems. *      Plan:  1. Acute hypoxic respiratory failure due to COVID-19 pneumonia, presented with oxygen saturation 76% on room air, intubated and paralyzed, on pressors  2. Acute COVID-19 pneumonia, start the patient on Decadron, ID was consulted for remdesivir. Patient might be in cytokine storm, I contacted oncology patient might need to be started on Tocilizumab, intubated and paralyzed, on pressors, remdesivir was stopped  3. Superimposed bacterial pneumonia, procalcitonin is elevated at 0.6, added levaquin  4. Suspecting cytokine storm with elevated D-dimer, started full dose anticoagulation. 5.  Acute renal failure, presented with creatinine of 2.4 not sure about the patient's baseline he might have CKD. Patient received IV fluids in the ED and creatinine trended down to 1.2.  . 6. Elevated transaminases, most likely due to COVID-19 infection and possible cytokine storm, trending down, continue to monitor. NOTE: This report was transcribed using voice recognition software. Every effort was made to ensure accuracy; however, inadvertent computerized transcription errors may be present.   Electronically signed by Hilda Randle MD on 11/16/2020 at 5:28 PM

## 2020-11-16 NOTE — CONSULTS
Comprehensive Nutrition Assessment    Type and Reason for Visit:  Initial, Consult(TF recommendations)    Nutrition Recommendations/Plan: Continue NPO. Start Tube feeding as medically feasible    TF recommendation:  While prone: trickle feedings w/ Semi-elemental  When medically appropriate/supine: semi-elemental @ 65ml/hr + 1 protein modular daily  Will provide 1560 ml TV, 1872 kcals, 117 gm pro, 1265 ml free water (1976 kcals & 143 gm pro w/ daily pro mod)    Nutrition Assessment:  Pt is at nutritoinal risk d/t admit w/ COVID-19 PNA, now s/p intubated, sedated, paralyzed in the prone position. Note intermittent hypotension on pressor (MAP 71). Will provide TF recs and monitor.     Malnutrition Assessment:  Malnutrition Status:  Insufficient data    Context:  Acute Illness     Findings of the 6 clinical characteristics of malnutrition:  Energy Intake:  Mild decrease in energy intake (Comment)  Weight Loss:  Unable to assess     Body Fat Loss:  Unable to assess     Muscle Mass Loss:  Unable to assess    Fluid Accumulation:  No significant fluid accumulation     Strength:  Not Performed    Estimated Daily Nutrient Needs:  Energy (kcal):  ; Weight Used for Energy Requirements:  Current     Protein (g):  140-155; Weight Used for Protein Requirements:  Ideal(2-2.2)        Fluid (ml/day):  per critical care;     Nutrition Related Findings:  intubated/sedated/paralyzed, prone, OGT, active BS, pressor w/ MAP 71, no edema, fluids WDL      Wounds:  None       Current Nutrition Therapies:    Diet NPO Effective Now    Anthropometric Measures:  · Height: 5' 8\" (172.7 cm)  · Current Body Weight: 233 lb (105.7 kg)   · Usual Body Weight: (UTO)     · Ideal Body Weight: 154 lbs; % Ideal Body Weight 151.3 %   · BMI: 35.4  · BMI Categories: Obese Class 2 (BMI 35.0 -39.9)       Nutrition Diagnosis:   · Inadequate oral intake related to impaired respiratory function(2/2 COVID-19 PNA) as evidenced by NPO or clear liquid

## 2020-11-16 NOTE — PATIENT CARE CONFERENCE
Intensive Care Daily Quality Rounding Checklist      ICU Team Members: Dr. Kristian Meckel, Anish Latif & Melinda (residents), clinical pharmacist, charge nurse, bedside nurse, respiratory therapist    ICU Day #: NUMBER: 2    Intubation Date: November 15    Ventilator Day #: NUMBER: 2    Central Line Insertion Date: November 15        Day #: NUMBER: 2     Arterial Line Insertion Date: November 15      Day #: NUMBER: 2    DVT Prophylaxis: Heparin drip    GI Prophylaxis: Protonix    Martinez Catheter Insertion Date: November 15       Day #: 2      Continued need (if yes, reason documented and discussed with physician): yes, strict I&O, on pressors    Skin Issues/ Wounds and ordered treatment discussed on rounds: No issues    Goals/ Plans for the Day: Daily labs, wean vent as able, attempt to supine patient, continue current care

## 2020-11-16 NOTE — PROGRESS NOTES
Critical Care Team - Daily Progress Note         Date and time: 11/16/2020 1:03 PM  Patient's name:  Arvind Galvan Record Number: [id number]  Patient's account/billing number: [de-identified]  Patient's YOB: 1955  Age: 77 y.o. Date of Admission: 11/11/2020  2:12 PM  Length of stay during current admission: 5      Primary Care Physician: No primary care provider on file. ICU Attending Physician: Dr. Jason Prado    Code Status: Full Code    Reason for ICU admission: Acute hypoxic respiratory failure secondary to COVID-19 pneumonia      SUBJECTIVE:     BRIEF HISTORY: History is gathered from medical records, patient was in respiratory distress on BiPAP, emergently being intubated on arrival in the ICU.     Tonny pearce 65 y.o. arrived to the ED 11/11 with fever, nausea, vomiting.  He reports the symptoms began 6 days prior. Loss of appetite red green-white productive sputum, urinary frequency, diarrhea rhinorrhea, nasal congestion chest pain right upper quadrant abdominal pain.  He had been on a Z-Michael from his primary physician no improvement in symptoms he complains of right sided pleuritic chest pain with cough.  ABG within normal range, leukocytosis, lymphopenia elevated D-dimer greater than 5250 he was started on heparin drip, ferritin 1710 and admitted await ID consult for treatment will need remdesivir, infectious work-up.  He arrived hypoxic was placed on 15 L nonrebreather he denies history of PEs or DVTs      Patient was admitted to the hospital, was on BiPAP, OptiFlow. On 11/15/2020, patient was unable to obtain O2 sat above upper 80s, was in respiratory distress with respiratory rate of 40-50/min. Because of his increasing respiratory distress on BiPAP, patient was transferred to ICU, intubated. He has been followed by pulmonology, infectious disease throughout hospital stay.      OVERNIGHT EVENTS:    11/16/20: Some soft BPs in the evening after being proned, Silas-Synephrine (35.8 °C) Bladder 67 30 98 % --   11/16/20 0755 -- -- -- 67 30 99 % --         Intake/Output Summary (Last 24 hours) at 11/16/2020 1303  Last data filed at 11/16/2020 1201  Gross per 24 hour   Intake 4035.9 ml   Output 3450 ml   Net 585.9 ml     Wt Readings from Last 2 Encounters:   11/11/20 233 lb 5 oz (105.8 kg)     Body mass index is 35.48 kg/m².         PHYSICAL EXAMINATION:  General: Intubated, sedated  Eyes: conjunctivae/corneas clear, sclera non icteric  Ears: no obvious scars, no lesions, no masses  Mouth: mucous membranes moist, no obvious oral sores  Head: normocephalic, atraumatic  Neck: no JVD, no adenopathy, no thyromegaly, neck is supple, trachea is midline  Lungs: clear to auscultation bilaterally, no wheezing, or rales  Heart: tachycardic, regular rhythm, no murmur, normal S1, S2  Abdomen: soft, non-tender; bowel sounds normal; no masses, no organomegaly  Extremities: no lower extremity edema, extremities atraumatic, no cyanosis, no clubbing, 2+ pedal pulses palpated  Skin: normal color, normal texture, normal turgor, no rashes, no lesions  Neurologic: unable to assess   MEDICATIONS:    Scheduled Meds:   pantoprazole  40 mg Intravenous Daily    And    sodium chloride (PF)  10 mL Intravenous Daily    insulin lispro  0-6 Units Subcutaneous 4 times per day    chlorhexidine  15 mL Mouth/Throat BID    influenza virus vaccine  0.5 mL Intramuscular Prior to discharge    dexamethasone  6 mg Oral 2 times per day    levofloxacin  750 mg Intravenous Q24H    remdesivir IVPB  100 mg Intravenous Q24H    sodium chloride flush  10 mL Intravenous 2 times per day    vitamin C  1,000 mg Oral BID    zinc sulfate  50 mg Oral BID    vitamin D3  400 Units Oral Daily     Continuous Infusions:   dextrose      vecuronium (NORCURON) infusion 1 mcg/kg/min (11/16/20 0048)    fentaNYL 5 mcg/ml in 0.9%  ml infusion 40 mcg/hr (11/16/20 0728)    midazolam 8 mg/hr (11/16/20 0155)    phenylephrine (JORDON-SYNEPHRINE) 50mg/250mL infusion 100 mcg/min (11/16/20 1258)    lactated ringers 100 mL/hr at 11/16/20 0641    heparin (PORCINE) Infusion 13.4 Units/kg/hr (11/16/20 0830)     PRN Meds:   glucose, 15 g, PRN  dextrose, 12.5 g, PRN  glucagon (rDNA), 1 mg, PRN  dextrose, 100 mL/hr, PRN  sodium chloride, 30 mL, PRN  sodium chloride flush, 10 mL, PRN  acetaminophen, 650 mg, Q6H PRN    Or  acetaminophen, 650 mg, Q6H PRN  polyethylene glycol, 17 g, Daily PRN  promethazine, 12.5 mg, Q6H PRN    Or  ondansetron, 4 mg, Q6H PRN  heparin (porcine), 4,000 Units, PRN  heparin (porcine), 2,000 Units, PRN        VENT SETTINGS (Comprehensive) (if applicable):  Vent Information  $Ventilation: $Subsequent Day  Skin Assessment: Clean, dry, & intact  Vent Type: [id number]  Vent Mode: AC/VC  Vt Ordered: 500 mL  Rate Set: 30 bmp  Peak Flow: 70 L/min  Pressure Support: 0 cmH20  FiO2 : (S) 70 %  SpO2: 96 %  SpO2/FiO2 ratio: 138.57  Sensitivity: 3  PEEP/CPAP: 14  I Time/ I Time %: 0 s  Humidification Source: Heated wire  Humidification Temp: 37  Humidification Temp Measured: 37  Circuit Condensation: Drained  Mask Type: Full face mask  Mask Size: Medium  Additional Respiratory  Assessments  Pulse: 76  Resp: 30  SpO2: 96 %  Position: Prone  Humidification Source: Heated wire  Humidification Temp: 37  Circuit Condensation: Drained  Oral Care: Mouth swabbed, Mouth moisturizer, Mouth suctioned  Subglottic Suction Done?: Yes  Cuff Pressure (cm H2O): 29 cm H2O    ABGs:   Recent Labs     11/16/20  0620   PH 7.322*   PCO2 54.4*   PO2 109.3*   HCO3 27.5*   BE 0.4   O2SAT 97.8       Laboratory findings:    Complete Blood Count:   Recent Labs     11/14/20  0508 11/15/20  1330 11/16/20  0609   WBC 14.5* 19.1* 16.4*   HGB 14.2 14.9 13.5   HCT 42.2 47.3 42.5    454* 433        Last 3 Blood Glucose:   Recent Labs     11/14/20  0508 11/15/20  1330 11/16/20  0609   GLUCOSE 177* 172* 226*        PT/INR:    Lab Results   Component Value Date    PROTIME 10.9 02/16/2012    INR 0.9 02/16/2012     PTT:    Lab Results   Component Value Date    APTT 103.3 11/16/2020       Comprehensive Metabolic Profile:   Recent Labs     11/14/20  0508 11/15/20  1330 11/16/20  0609    141 140   K 3.8 3.7 4.7    100 105   CO2 22 29 28   BUN 32* 21 18   CREATININE 1.2 1.2 1.0   GLUCOSE 177* 172* 226*   CALCIUM 8.4* 8.6 8.0*   PROT 7.0 7.9 6.2*   LABALBU 2.8* 3.2* 2.3*   BILITOT 0.7 1.1 0.7   ALKPHOS 132* 142* 123   AST 48* 58* 29   ALT 79* 72* 49*      Magnesium:   Lab Results   Component Value Date    MG 2.4 11/16/2020     Phosphorus:   Lab Results   Component Value Date    PHOS 3.3 11/16/2020     Ionized Calcium: No results found for: CAION       Troponin: No results for input(s): TROPONINI in the last 72 hours. Microbiology:  Cultures during this admission:     Blood cultures:                 [] None drawn      [x] Negative             []  Positive (Details:  )  Urine Culture:                   [x] None drawn      [] Negative             []  Positive (Details:  )  Sputum Culture:               [] None drawn       [] Negative             [x]  Positive (Details: ordered 11/16, pending )   Endotracheal aspirate:     [] None drawn       [] Negative             [x]  Positive (Details: ordered 11/16, pending )     Other pertinent Labs: Strep and legionella ag negative    Radiology/Imaging:     Chest x-ray:  11/16/20:   Persistent bilateral ground-glass opacity infiltrates throughout both lungs    with fine underline reticular pattern.  The infiltrates appears to be    somewhat increased since the previous examination of November 15.  No    conspicuous pleural effusions observed.  The heart is normal size.         RECOMMENDATION:    Further increase in bilateral infiltrates since November 15. ASSESSMENT:     Active Problems:    Hypoxia  Resolved Problems:    * No resolved hospital problems.  *      Additional assessment:    COVID-19 pneumonia  Acute Respiratory failure secondary to COVID-19 pneumonia  Community acquired bacterial pneumonia  Hypotension         PLAN:     WEAN PER PROTOCOL:  [] No   [x] Yes  [] N/A    DISCONTINUE ANY LABS:   [x] No   [] Yes    ICU PROPHYLAXIS:  Stress ulcer:  [x] PPI Agent  [] [id number] [] Sucralfate  [] Other:  VTE:  On Heparin gtt [] Enoxaparin  [] Unfract. Heparin Subcut  [] EPC Cuffs    NUTRITION:  [x] NPO [] Tube Feeding (Specify: ) [] TPN  [] PO (Diet: Diet NPO Effective Now)    HOME MEDICATIONS RECONCILED: [] No  [] Yes    INSULIN DRIP:   [x] No   [] Yes    CONSULTATION NEEDED:  [] No   [x] Yes    FAMILY UPDATED:    [x] No   [] Yes    TRANSFER OUT OF ICU:   [x] No   [] Yes    ADDITIONAL PLAN:  SYSTEMS ASSESSMENT     Neuro   Intubated, sedated, no acute issues     Respiratory   Acute hypoxic respiratory failure secondary to COVID-19 pneumonia  -Was initially on BiPAP, was intubated 11/15  -ID following, currently on remdesivir  -Currently on levofloxacin for presumed community-acquired pneumonia superimposed on COVID-19 PNA     -ABG after intubation markable for acute respiratory acidosis, PF ratio of 0.77. Will prone patient, increase respiratory rate. - PF ratio improved, PEEP increased, RR decreased. Will flip back to supine tomorrow 11/17     Cardiovascular   Hypotension- Silas-Synephrine PRN to maintain MAP >65 mmhg      Gastrointestinal   No acute issues  - Consult dietary for tube feeding      Renal   No acute issues     Infectious Disease   COVID-19 pneumonia  On Decadron, remdesivir  Presumed community-acquired pneumonia, followed by ID, on levofloxacin     Hematology/Oncology   On heparin gtt     Endocrine   Hyperglycemia related to steroids - start low dose insulin sliding scale     Social/Spiritual/DNR/Other   Full code      Diet: Diet NPO Effective Now  CODE Status: Full Code    Dispo: Maintain ICU level of care    1. As above  2. Keep patient prone, supine in AM and get repeat ABG and CXR  3.  Silas-Synephrine as needed to maintain MAP > 65 mmhg. 4. Dietary recs for tube feeding  5. Low dose insulin SS  6. GI prophylaxis - protonix  7. DVT Prophylaxis - heparin gtt  8. Discuss case and plan with attending, Dr. Frankey Freer, DO  Resident, PGY-1  11/16/2020  1:03 PM    I personally saw, examined and provided care for the patient. Radiographs, labs and medication list were reviewed by me independently. I spoke with bedside nursing, therapists and consultants. Critical care services and times documented are independent of procedures and multidisciplinary rounds with Residents. Additionally comprehensive, multidisciplinary rounds were conducted with the MICU team. The case was discussed in detail and plans for care were established. Review of Residents documentation was conducted and revisions were made as appropriate. I agree with the above documented exam, problem list and plan of care with the following additions:    Acute respiratory failure secondary to severe COVID-19 pneumonia on Remdesivir and Decadron  Prone today - proceed with supine trial in the AM    37 minutes of CCT spent with the patient, reviewing the chart including imaging studies, and discussing the case with other health care professionals. This time excludes procedures.      Humaira Culp MD

## 2020-11-16 NOTE — PROGRESS NOTES
[address] [place] [address] [facility]  [facility]  Progress Note    SUBJECTIVE:  Chief Complaint   Patient presents with    Shortness of Breath     76% room air at arrival     Fever    Emesis     The patient is in the ICU. He is intubated, sedated and paralyzed. He is on a ventilator. Tolerating medications. Review of systems:  As stated above in the chief complaint, otherwise negative. Medications:  Scheduled Meds:   chlorhexidine  15 mL Mouth/Throat BID    influenza virus vaccine  0.5 mL Intramuscular Prior to discharge    dexamethasone  6 mg Oral 2 times per day    levofloxacin  750 mg Intravenous Q24H    remdesivir IVPB  100 mg Intravenous Q24H    sodium chloride flush  10 mL Intravenous 2 times per day    vitamin C  1,000 mg Oral BID    zinc sulfate  50 mg Oral BID    vitamin D3  400 Units Oral Daily     Continuous Infusions:   vecuronium (NORCURON) infusion 1 mcg/kg/min (20 0048)    fentaNYL 5 mcg/ml in 0.9%  ml infusion 40 mcg/hr (20 0728)    propofol Stopped (11/15/20 1830)    midazolam 8 mg/hr (20 0155)    phenylephrine (JORDON-SYNEPHRINE) 50mg/250mL infusion 125 mcg/min (20 0854)    lactated ringers 100 mL/hr at 20 0641    heparin (PORCINE) Infusion 13.4 Units/kg/hr (20 0830)     PRN Meds:sodium chloride, sodium chloride flush, acetaminophen **OR** acetaminophen, polyethylene glycol, promethazine **OR** ondansetron, heparin (porcine), heparin (porcine)    OBJECTIVE:  /61   Pulse 64   Temp 96.4 °F (35.8 °C) (Bladder)   Resp 30   Ht 5' 8\" (1.727 m)   Wt 233 lb 5 oz (105.8 kg)   SpO2 98%   BMI 35.48 kg/m²   Temp  Av.2 °F (36.2 °C)  Min: 96.4 °F (35.8 °C)  Max: 98.1 °F (36.7 °C)  Constitutional: The patient is lying in bed in the ICU. He is pronated. He is intubated, sedated and paralyzed. He is on a ventilator. Physical exam is limited. Skin: diaphoretic. No rashes were noted. HEENT: ET tube. OG tube.   Neck: Supple to movements. Chest: Good breath sounds bilaterally. No crackles were heard. Cardiovascular: Heart sounds rhythmic and regular. Abdomen: Could not be examined. Extremities: No edema. Lines: Right IJ TLC 11/15/2020. Right radial arterial line 11/15/2020. Laboratory and Tests Review:  Lab Results   Component Value Date    WBC 16.4 (H) 11/16/2020    WBC 19.1 (H) 11/15/2020    WBC 14.5 (H) 11/14/2020    HGB 13.5 11/16/2020    HCT 42.5 11/16/2020    MCV 87.6 11/16/2020     11/16/2020     Lab Results   Component Value Date    NEUTROABS 15.58 (H) 11/16/2020    NEUTROABS 16.81 (H) 11/15/2020    NEUTROABS 12.31 (H) 11/14/2020     No results found for: [facility]  Lab Results   Component Value Date    ALT 49 (H) 11/16/2020    AST 29 11/16/2020    ALKPHOS 123 11/16/2020    BILITOT 0.7 11/16/2020     Lab Results   Component Value Date     11/16/2020    K 4.7 11/16/2020    K 3.7 11/15/2020     11/16/2020    CO2 28 11/16/2020    BUN 18 11/16/2020    CREATININE 1.0 11/16/2020    CREATININE 1.2 11/15/2020    CREATININE 1.2 11/14/2020    GFRAA >60 11/16/2020    LABGLOM >60 11/16/2020    GLUCOSE 226 11/16/2020    GLUCOSE 154 02/16/2012    PROT 6.2 11/16/2020    LABALBU 2.3 11/16/2020    CALCIUM 8.0 11/16/2020    BILITOT 0.7 11/16/2020    ALKPHOS 123 11/16/2020    AST 29 11/16/2020    ALT 49 11/16/2020     Lab Results   Component Value Date    CRP 12.4 (H) 11/15/2020    CRP 27.8 (H) 11/12/2020    CRP 23.0 (H) 11/11/2020     Lab Results   Component Value Date    SEDRATE 86 (H) 11/12/2020     Radiology:  Chest x-ray shows bilateral interstitial infiltrates    Microbiology:   Streptococcus pneumoniae/Legionella urine Ag: Negative  Blood cultures 11/11/2020: Negative  Respiratory culture 11/16/2020: Pending  Nares screen MRSA: Pending    Recent Labs     11/15/20  1330   PROCAL 0.46*       ASSESSMENT:  · Moderate to severe SARS-CoV-2 infection with pneumonia. Completed 5 days of Remdesivir 11/16/2020.   CRP coming down  · Acute respiratory failure  · Lymphopenia secondary to SARS-CoV-2 infection  · Leukocytosis associated to the above and steroids  · Probable community-acquired pneumonia  · Elevation of transaminases associated to the above, improving    PLAN:  · Continue Levofloxacin  · Continue Remdesivir, day 5  · Continue Dexamethasone  · Anticoagulation  · Respiratory cultures    Thomas Rides  9:09 AM  11/16/2020

## 2020-11-16 NOTE — PROGRESS NOTES
Patient proned in bed per Dr Asia Preciado order. 3 RN and RT at bedside. Patient tolerated and stable upon completion.

## 2020-11-17 NOTE — PROGRESS NOTES
[facility] Progress Note    Admitting Date and Time: 11/11/2020  2:12 PM  Admit Dx: Hypoxia [R09.02]  Hypoxia [R09.02]    Subjective:  Patient is being followed for Hypoxia [R09.02]  Hypoxia [R09.02]   Pt is intubated and paralyzed, pronated. Still hypoxic and hypotensive on pressors giancarlo    ROS: denies fever, chills, cp, n/v, HA unless stated above.       potassium phosphate IVPB  15 mmol Intravenous Once    pantoprazole  40 mg Intravenous Daily    And    sodium chloride (PF)  10 mL Intravenous Daily    insulin lispro  0-6 Units Subcutaneous 4 times per day    enoxaparin  110 mg Subcutaneous BID    chlorhexidine  15 mL Mouth/Throat BID    influenza virus vaccine  0.5 mL Intramuscular Prior to discharge    dexamethasone  6 mg Oral 2 times per day    levofloxacin  750 mg Intravenous Q24H    sodium chloride flush  10 mL Intravenous 2 times per day    vitamin C  1,000 mg Oral BID    zinc sulfate  50 mg Oral BID    vitamin D3  400 Units Oral Daily     glucose, 15 g, PRN  dextrose, 12.5 g, PRN  glucagon (rDNA), 1 mg, PRN  dextrose, 100 mL/hr, PRN  sodium chloride, 30 mL, PRN  sodium chloride flush, 10 mL, PRN  acetaminophen, 650 mg, Q6H PRN    Or  acetaminophen, 650 mg, Q6H PRN  polyethylene glycol, 17 g, Daily PRN  promethazine, 12.5 mg, Q6H PRN    Or  ondansetron, 4 mg, Q6H PRN  heparin (porcine), 4,000 Units, PRN  heparin (porcine), 2,000 Units, PRN         Objective:    /67   Pulse 93   Temp 97.2 °F (36.2 °C) (Bladder)   Resp 30   Ht 5' 8\" (1.727 m)   Wt 233 lb 5 oz (105.8 kg)   SpO2 92%   BMI 35.48 kg/m²     General Appearance: intubated and paralyzed, pronated  Skin: warm and dry  Head: normocephalic and atraumatic  Eyes: pupils equal, round, and reactive to light, extraocular eye movements intact, conjunctivae normal  Neck: neck supple and non tender without mass   Pulmonary/Chest: diminished  bilaterally- no wheezes, rales or rhonchi, normal air movement  Cardiovascular: normal rate, prontaed  Extremities: no cyanosis, no clubbing and no edema  Neurologic: intubated and paralyzed        Recent Labs     11/15/20  1330 11/16/20  0609 11/17/20  0510    140 144   K 3.7 4.7 4.5    105 109*   CO2 29 28 29   BUN 21 18 18   CREATININE 1.2 1.0 0.9   GLUCOSE 172* 226* 184*   CALCIUM 8.6 8.0* 8.1*       Recent Labs     11/15/20  1330 11/16/20  0609 11/17/20  0510   WBC 19.1* 16.4* 14.7*   RBC 5.41 4.85 4.54   HGB 14.9 13.5 13.0   HCT 47.3 42.5 39.4   MCV 87.4 87.6 86.8   MCH 27.5 27.8 28.6   MCHC 31.5* 31.8* 33.0   RDW 14.8 15.2* 15.3*   * 433 386   MPV 11.2 10.6 10.2         Assessment:    Active Problems:    Hypoxia  Resolved Problems:    * No resolved hospital problems. *      Plan:  1. Acute hypoxic respiratory failure due to COVID-19 pneumonia, presented with oxygen saturation 76% on room air, intubated and paralyzed, on pressors, currently on 50% FiO2 with O2 sat 92%  2. Acute COVID-19 pneumonia, start the patient on Decadron, ID was consulted for remdesivir. Patient might be in cytokine storm, I contacted oncology patient might need to be started on Tocilizumab, intubated and paralyzed, on pressors, remdesivir was stopped  3. Superimposed bacterial pneumonia, procalcitonin is elevated at 0.6, added levaquin  4. Suspecting cytokine storm with elevated D-dimer, started full dose anticoagulation. 5.  Acute renal failure, presented with creatinine of 2.4 not sure about the patient's baseline he might have CKD. Patient received IV fluids in the ED and creatinine trended down to 1.2.  . 6. Elevated transaminases, most likely due to COVID-19 infection and possible cytokine storm, trending down, continue to monitor. NOTE: This report was transcribed using voice recognition software. Every effort was made to ensure accuracy; however, inadvertent computerized transcription errors may be present.   Electronically signed by Sanaz Clements MD on 11/17/2020 at 8:30 AM

## 2020-11-17 NOTE — PROGRESS NOTES
DAILY VENTILATOR WEANING ASSESSMENT PERFORMED    P/FIO2 Ratio =    115      (<100= do not Wean)                  Cs =               34           (<32= Instability)  Plat. Pressure = 27  MV =15.9  RSBI =    Instabilities:       Cardiovascular =       CNS =       Respiratory =       Metabolic =    Parameters    no    Wean per protocol  no    Ask Physician for a weaning plan yes    Additional Comments: prone at this time. Will ask during rounds for supine    Performed by Josiane Gallegos RRT      Reference Table:    Cardiovascular     CNS      1. Mean BP less than or equal to 75   1. Neuromuscular blockade  2. Heart Rate greater than 130   2. RASS of -3, -4, -5  3. Myocardial Ischemia    3. RASS of +3, +4  4. Mechanical Assist Device    4. ICP greater than 15 or             Intracranial Hypertension         Respiratory      Metabolic  1. PEEP equal to or greater than 10cm/H20  1. Temp. (8hrs) less than 95 or > 103  2. Respiratory Rate greater than 35   2. WBC < 5000 or > 01173  3. Minute Volume greater than 15L  4. pH less than 7.30  5.  Deteriorating chest X-ray

## 2020-11-17 NOTE — PROGRESS NOTES
[address] [place] [address] [facility]  Progress Note    SUBJECTIVE:  Chief Complaint   Patient presents with    Shortness of Breath     76% room air at arrival     Fever    Emesis     The patient is on the ventilator, intubated, sedated and paralyzed. Pronated. He is still in the ICU. Review of systems:  As stated above in the chief complaint, otherwise negative. Medications:  Scheduled Meds:   potassium phosphate IVPB  15 mmol Intravenous Once    pantoprazole  40 mg Intravenous Daily    And    sodium chloride (PF)  10 mL Intravenous Daily    insulin lispro  0-6 Units Subcutaneous 4 times per day    enoxaparin  110 mg Subcutaneous BID    chlorhexidine  15 mL Mouth/Throat BID    influenza virus vaccine  0.5 mL Intramuscular Prior to discharge    dexamethasone  6 mg Oral 2 times per day    levofloxacin  750 mg Intravenous Q24H    sodium chloride flush  10 mL Intravenous 2 times per day    vitamin C  1,000 mg Oral BID    zinc sulfate  50 mg Oral BID    vitamin D3  400 Units Oral Daily     Continuous Infusions:   dextrose      vecuronium (NORCURON) infusion 1 mcg/kg/min (20 1731)    fentaNYL 5 mcg/ml in 0.9%  ml infusion 175 mcg/hr (20 0859)    midazolam 8 mg/hr (20 0846)    phenylephrine (JORDON-SYNEPHRINE) 50mg/250mL infusion 50 mcg/min (20 0912)    lactated ringers 100 mL/hr at 20 1643     PRN Meds:glucose, dextrose, glucagon (rDNA), dextrose, sodium chloride, sodium chloride flush, acetaminophen **OR** acetaminophen, polyethylene glycol, promethazine **OR** ondansetron, heparin (porcine), heparin (porcine)    OBJECTIVE:  /73   Pulse 98   Temp 97.2 °F (36.2 °C) (Bladder)   Resp 29   Ht 5' 8\" (1.727 m)   Wt 233 lb 5 oz (105.8 kg)   SpO2 92%   BMI 35.48 kg/m²   Temp  Av.2 °F (36.2 °C)  Min: 97.2 °F (36.2 °C)  Max: 97.4 °F (36.3 °C)  Constitutional: The patient is lying in bed in the ICU. He is pronated.   He is intubated, sedated and paralyzed. He is on a ventilator. Physical exam is limited. Skin: Dry. No rashes were noted. HEENT: ET tube. OG tube. Neck: Supple to movements. Chest: Good breath sounds bilaterally. No crackles were heard. Cardiovascular: Heart sounds rhythmic and regular. Abdomen: Could not be examined. Extremities: No edema. Lines: Right IJ TLC 11/15/2020. Right radial arterial line 11/15/2020.     Laboratory and Tests Review:  Lab Results   Component Value Date    WBC 14.7 (H) 11/17/2020    WBC 16.4 (H) 11/16/2020    WBC 19.1 (H) 11/15/2020    HGB 13.0 11/17/2020    HCT 39.4 11/17/2020    MCV 86.8 11/17/2020     11/17/2020     Lab Results   Component Value Date    NEUTROABS 13.52 (H) 11/17/2020    NEUTROABS 15.58 (H) 11/16/2020    NEUTROABS 16.81 (H) 11/15/2020     No results found for: [facility]  Lab Results   Component Value Date    ALT 42 (H) 11/17/2020    AST 46 (H) 11/17/2020    ALKPHOS 122 11/17/2020    BILITOT 0.5 11/17/2020     Lab Results   Component Value Date     11/17/2020    K 4.5 11/17/2020    K 3.7 11/15/2020     11/17/2020    CO2 29 11/17/2020    BUN 18 11/17/2020    CREATININE 0.9 11/17/2020    CREATININE 1.0 11/16/2020    CREATININE 1.2 11/15/2020    GFRAA >60 11/17/2020    LABGLOM >60 11/17/2020    GLUCOSE 184 11/17/2020    GLUCOSE 154 02/16/2012    PROT 5.7 11/17/2020    LABALBU 2.4 11/17/2020    CALCIUM 8.1 11/17/2020    BILITOT 0.5 11/17/2020    ALKPHOS 122 11/17/2020    AST 46 11/17/2020    ALT 42 11/17/2020     Lab Results   Component Value Date    CRP 8.6 (H) 11/17/2020    CRP 12.4 (H) 11/15/2020    CRP 27.8 (H) 11/12/2020     Lab Results   Component Value Date    SEDRATE 86 (H) 11/12/2020     Radiology:  Chest x-ray shows bilateral interstitial infiltrates    Microbiology:   Streptococcus pneumoniae/Legionella urine Ag: Negative  Blood cultures 11/11/2020: X2  Respiratory culture 11/16/2020: Pending  Nares screen MRSA: Pending    Recent Labs     11/15/20  1623 PROCAL 0.46*       ASSESSMENT:  · Moderate to severe SARS-CoV-2 infection with pneumonia. Completed 5 days of Remdesivir 11/16/2020.   CRP coming down  · Acute respiratory failure  · Lymphopenia secondary to SARS-CoV-2 infection  · Leukocytosis associated to the above and steroids, improving  · Probable community-acquired pneumonia  · Elevation of transaminases associated to the above, fluctuating    PLAN:  · Continue Levofloxacin  · Continue Dexamethasone  · Anticoagulation  · Respiratory cultures were ordered yesterday    Modesta Preciado  9:13 AM  11/17/2020

## 2020-11-17 NOTE — PROGRESS NOTES
Critical Care Team - Daily Progress Note         Date and time: 11/17/2020 1:51 PM  Patient's name:  Aaron Reynoso Record Number: [id number]  Patient's account/billing number: [de-identified]  Patient's YOB: 1955  Age: 77 y.o. Date of Admission: 11/11/2020  2:12 PM  Length of stay during current admission: 6      Primary Care Physician: No primary care provider on file. ICU Attending Physician: Dr. Lexa Dias    Code Status: Full Code    Reason for ICU admission: Acute hypoxic respiratory failure secondary to COVID-19 pneumonia      SUBJECTIVE:     BRIEF HISTORY: History is gathered from medical records, patient was in respiratory distress on BiPAP, emergently being intubated on arrival in the ICU.     Fernanda pearce 65 y.o. arrived to the ED 11/11 with fever, nausea, vomiting.  He reports the symptoms began 6 days prior. Loss of appetite red green-white productive sputum, urinary frequency, diarrhea rhinorrhea, nasal congestion chest pain right upper quadrant abdominal pain.  He had been on a Z-Michael from his primary physician no improvement in symptoms he complains of right sided pleuritic chest pain with cough.  ABG within normal range, leukocytosis, lymphopenia elevated D-dimer greater than 5250 he was started on heparin drip, ferritin 1710 and admitted await ID consult for treatment will need remdesivir, infectious work-up.  He arrived hypoxic was placed on 15 L nonrebreather he denies history of PEs or DVTs      Patient was admitted to the hospital, was on BiPAP, OptiFlow. On 11/15/2020, patient was unable to obtain O2 sat above upper 80s, was in respiratory distress with respiratory rate of 40-50/min. Because of his increasing respiratory distress on BiPAP, patient was transferred to ICU, intubated. He has been followed by pulmonology, infectious disease throughout hospital stay.      OVERNIGHT EVENTS:    11/16/20: Some soft BPs in the evening after being proned, Silas-Synephrine started. 20: Patient supine. Will attempt 6 hours and switch back to prone.       CURRENT VENTILATION STATUS:     [x] Ventilator  [] BIPAP  [] Nasal Cannula [] Room Air        SECRETIONS : Amount:  [] Small [] Moderate  [] Large  [x] None  Color:     [] White [] Colored  [] Bloody    SEDATION:    [] Propofol gtt  [x] Versed gtt & fentanyl gtt [] Ativan gtt   [] No Sedation    PARALYZED:  [] No    [x] Yes      VASOPRESSORS:  [] No    [x] Yes    If yes -   [] Levophed       [] Dopamine     [] Vasopressin       [] Dobutamine  [x] Phenylephrine         [] Epinephrine    CENTRAL LINES:     [] No   [] Yes   (Date of Insertion:  11/15 )           If yes -     [x] Right IJ     [] Left IJ [] Right Femoral [] Left Femoral                   [] Right Subclavian [] Left Subclavian       HANSON'S CATHETER:   [] No   [x] Yes  (Date of Insertion:  11/15 )     URINE OUTPUT:            [x] Good   [] Low              [] Anuric      OBJECTIVE:     VITAL SIGNS:  /65   Pulse 80   Temp 96.8 °F (36 °C) (Bladder)   Resp 29   Ht 5' 8\" (1.727 m)   Wt 233 lb 5 oz (105.8 kg)   SpO2 96%   BMI 35.48 kg/m²   Tmax over 24 hours:  Temp (24hrs), Av.2 °F (36.2 °C), Min:96.8 °F (36 °C), Max:97.4 °F (36.3 °C)      Patient Vitals for the past 6 hrs:   BP Temp Temp src Pulse Resp SpO2   20 1330 103/65 -- -- 80 29 96 %   20 1328 -- -- -- 80 30 96 %   20 1300 114/69 -- -- 83 30 --   20 1230 115/69 -- -- 87 30 --   20 1217 -- -- -- 93 30 95 %   20 1200 139/83 -- -- 92 29 90 %   20 1153 139/83 96.8 °F (36 °C) Bladder 95 30 93 %   20 1145 139/83 -- -- 88 30 92 %   20 1130 117/73 -- -- 98 27 95 %   20 1100 111/69 -- -- 62 30 97 %   20 1045 111/69 -- -- 63 29 96 %   20 1030 102/65 -- -- 65 30 95 %   20 1015 102/65 -- -- 85 30 93 %   20 1013 102/65 -- -- 86 29 93 %   20 1008 -- -- -- 70 29 95 %   20 1000 125/75 -- -- 71 29 92 %   20 0939 125/75 -- -- 64 30 93 %   11/17/20 0915 119/73 -- -- 95 30 91 %   11/17/20 0912 119/73 -- -- 98 29 92 %   11/17/20 0910 119/73 -- -- 97 29 92 %   11/17/20 0900 (!) 144/82 -- -- 91 30 95 %   11/17/20 0830 (!) 148/98 97.2 °F (36.2 °C) Bladder 60 30 --   11/17/20 0824 (!) 148/98 -- -- 86 29 92 %   11/17/20 0819 -- -- -- 93 30 92 %   11/17/20 0815 (!) 148/98 -- -- 98 30 93 %   11/17/20 0800 106/67 -- -- 92 30 90 %         Intake/Output Summary (Last 24 hours) at 11/17/2020 1351  Last data filed at 11/17/2020 1330  Gross per 24 hour   Intake 4266.53 ml   Output 3350 ml   Net 916.53 ml     Wt Readings from Last 2 Encounters:   11/11/20 233 lb 5 oz (105.8 kg)     Body mass index is 35.48 kg/m².         PHYSICAL EXAMINATION:  General: Intubated, sedated  Eyes: conjunctivae/corneas clear, sclera non icteric  Ears: no obvious scars, no lesions, no masses  Mouth: mucous membranes moist, no obvious oral sores  Head: normocephalic, atraumatic  Neck: no JVD, no adenopathy, no thyromegaly, neck is supple, trachea is midline  Lungs: clear to auscultation bilaterally, no wheezing, or rales  Heart: tachycardic, regular rhythm, no murmur, normal S1, S2  Abdomen: soft, non-tender; bowel sounds normal; no masses, no organomegaly  Extremities: mild  lower extremity edema, extremities atraumatic, no cyanosis, no clubbing, 2+ pedal pulses palpated  Skin: normal color, normal texture, normal turgor, no rashes, no lesions  Neurologic: unable to assess   MEDICATIONS:    Scheduled Meds:   midodrine  10 mg Oral TID WC    insulin lispro  0-12 Units Subcutaneous Q6H    [START ON 11/18/2020] dexamethasone  6 mg Oral Daily    pantoprazole  40 mg Intravenous Daily    And    sodium chloride (PF)  10 mL Intravenous Daily    enoxaparin  110 mg Subcutaneous BID    chlorhexidine  15 mL Mouth/Throat BID    influenza virus vaccine  0.5 mL Intramuscular Prior to discharge    dexamethasone  6 mg Oral 2 times per day    levofloxacin  750 mg Intravenous Q24H    sodium chloride flush  10 mL Intravenous 2 times per day    vitamin C  1,000 mg Oral BID    zinc sulfate  50 mg Oral BID    vitamin D3  400 Units Oral Daily     Continuous Infusions:   dextrose      vecuronium (NORCURON) infusion 1 mcg/kg/min (11/17/20 1102)    fentaNYL 5 mcg/ml in 0.9%  ml infusion 200 mcg/hr (11/17/20 1152)    midazolam 8 mg/hr (11/17/20 0846)    phenylephrine (JORDON-SYNEPHRINE) 50mg/250mL infusion 25 mcg/min (11/17/20 1155)     PRN Meds:   glucose, 15 g, PRN  dextrose, 12.5 g, PRN  glucagon (rDNA), 1 mg, PRN  dextrose, 100 mL/hr, PRN  sodium chloride, 30 mL, PRN  sodium chloride flush, 10 mL, PRN  acetaminophen, 650 mg, Q6H PRN    Or  acetaminophen, 650 mg, Q6H PRN  polyethylene glycol, 17 g, Daily PRN  promethazine, 12.5 mg, Q6H PRN    Or  ondansetron, 4 mg, Q6H PRN        VENT SETTINGS (Comprehensive) (if applicable):  Vent Information  $Ventilation: $Subsequent Day  Skin Assessment: Clean, dry, & intact  Equipment ID: [id number]  Vent Type: [phone]  Vent Mode: AC/VC  Vt Ordered: 500 mL  Rate Set: 30 bmp  Peak Flow: 70 L/min  Pressure Support: 0 cmH20  FiO2 : 80 %  SpO2: 96 %  SpO2/FiO2 ratio: 120  Sensitivity: 3  PEEP/CPAP: 14  I Time/ I Time %: 0 s  Humidification Source: Heated wire  Humidification Temp: 37  Humidification Temp Measured: 36.7  Circuit Condensation: Drained  Mask Type: Full face mask  Mask Size: Medium  Additional Respiratory  Assessments  Pulse: 80  Resp: 29  SpO2: 96 %  Position: Prone  Humidification Source: Heated wire  Humidification Temp: 37  Circuit Condensation: Drained  Oral Care: Lip moisturizer applied, Suction toothette, Teeth brushed, Mouth moisturizer  Subglottic Suction Done?: Yes  Cuff Pressure (cm H2O): 29 cm H2O    ABGs:   Recent Labs     11/17/20  1210   PH 7.442   PCO2 38.2   PO2 52.0*   HCO3 25.5   BE 1.5   O2SAT 86.9*       Laboratory findings:    Complete Blood Count:   Recent Labs     11/15/20  1330 11/16/20  0609 11/17/20  0510   WBC 19.1* 16.4* 14.7*   HGB 14.9 13.5 13.0   HCT 47.3 42.5 39.4   * 433 386        Last 3 Blood Glucose:   Recent Labs     11/15/20  1330 11/16/20  0609 11/17/20  0510   GLUCOSE 172* 226* 184*        PT/INR:    Lab Results   Component Value Date    PROTIME 10.9 02/16/2012    INR 0.9 02/16/2012     PTT:    Lab Results   Component Value Date    APTT 103.3 11/16/2020       Comprehensive Metabolic Profile:   Recent Labs     11/15/20  1330 11/16/20  0609 11/17/20  0510    140 144   K 3.7 4.7 4.5    105 109*   CO2 29 28 29   BUN 21 18 18   CREATININE 1.2 1.0 0.9   GLUCOSE 172* 226* 184*   CALCIUM 8.6 8.0* 8.1*   PROT 7.9 6.2* 5.7*   LABALBU 3.2* 2.3* 2.4*   BILITOT 1.1 0.7 0.5   ALKPHOS 142* 123 122   AST 58* 29 46*   ALT 72* 49* 42*      Magnesium:   Lab Results   Component Value Date    MG 2.6 11/17/2020     Phosphorus:   Lab Results   Component Value Date    PHOS 2.1 11/17/2020     Ionized Calcium: No results found for: CAION       Troponin: No results for input(s): TROPONINI in the last 72 hours.     Microbiology:  Cultures during this admission:     Blood cultures:                 [] None drawn      [x] Negative             []  Positive (Details:  )  Urine Culture:                   [x] None drawn      [] Negative             []  Positive (Details:  )  Sputum Culture:               [] None drawn       [] Negative             [x]  Positive (Details: ordered 11/16, pending )   Endotracheal aspirate:     [] None drawn       [] Negative             [x]  Positive (Details: ordered 11/16, pending )     Other pertinent Labs: Strep and legionella ag negative    Radiology/Imaging:     Chest x-ray:  11/16/20:   Persistent bilateral ground-glass opacity infiltrates throughout both lungs    with fine underline reticular pattern.  The infiltrates appears to be    somewhat increased since the previous examination of November 15.  No    conspicuous pleural effusions observed.  The heart is normal size.         RECOMMENDATION:    Further increase in bilateral infiltrates since November 15. ASSESSMENT:     Active Problems:    Hypoxia  Resolved Problems:    * No resolved hospital problems. *      Additional assessment:    COVID-19 pneumonia  Acute Respiratory failure secondary to COVID-19 pneumonia  Community acquired bacterial pneumonia  Hypotension         PLAN:     WEAN PER PROTOCOL:  [] No   [x] Yes  [] N/A    DISCONTINUE ANY LABS:   [x] No   [] Yes    ICU PROPHYLAXIS:  Stress ulcer:  [x] PPI Agent  [] [id number] [] Sucralfate  [] Other:  VTE:  On Heparin gtt [] Enoxaparin  [] Unfract. Heparin Subcut  [] EPC Cuffs    NUTRITION:  [x] NPO [] Tube Feeding (Specify: ) [] TPN  [] PO (Diet: Diet NPO Effective Now)    HOME MEDICATIONS RECONCILED: [] No  [] Yes    INSULIN DRIP:   [x] No   [] Yes    CONSULTATION NEEDED:  [x] No   [] Yes    FAMILY UPDATED:    [x] No   [] Yes    TRANSFER OUT OF ICU:   [x] No   [] Yes    ADDITIONAL PLAN:  SYSTEMS ASSESSMENT     Neuro   Intubated, sedated, no acute issues     Respiratory   Acute hypoxic respiratory failure secondary to COVID-19 pneumonia  -Was initially on BiPAP, was intubated 11/15  -ID following, currently on remdesivir  -levofloxacin for presumed community-acquired pneumonia superimposed on COVID-19 PNA  -Patient was prone for approximately 30 hours. Flipped patient supine today. Will assess ABG and flip back to prone after 6 hours. Superimposed PNA- levaquin day 6     Cardiovascular   Hypotension- Silas-Synephrine PRN to maintain MAP >65 mmhg. add midodrine and attempts to wean pressor requirement     Gastrointestinal   No acute issues  - Consult dietary for tube feeding      Renal   Hypophs-replace     Infectious Disease   COVID-19 pneumonia  On Decadron, decreased to daily. , remdesivir-finished  Presumed community-acquired pneumonia, followed by ID, on levofloxacin     Hematology/Oncology   Hypercoagulability secondary to Covid pneumonia   -Dimer increased, continue therapeutic dose Lovenox     Endocrine   Hyperglycemia related to steroids -increase insulin to medium scale     Social/Spiritual/DNR/Other   Full code      Diet: Diet NPO Effective Now  CODE Status: Full Code    Dispo: Maintain ICU level of care    1. As above  2. Supine patient, repeat ABG, placed prone every 6 hours  3. Admitted during an attempt to wean pressor requirement  4. Dietary recs for tube feeding  5. Decrease Decadron to daily  6. Increase sliding scale insulin to medium dose  7. GI prophylaxis - protonix  8. DVT Prophylaxis - lovenox BID  9. Discuss case and plan with attending, Dr. Trevon Latif, DO  Resident, PGY-2  11/17/2020  1:51 PM    I personally saw, examined and provided care for the patient. Radiographs, labs and medication list were reviewed by me independently. I spoke with bedside nursing, therapists and consultants. Critical care services and times documented are independent of procedures and multidisciplinary rounds with Residents. Additionally comprehensive, multidisciplinary rounds were conducted with the MICU team. The case was discussed in detail and plans for care were established. Review of Residents documentation was conducted and revisions were made as appropriate. I agree with the above documented exam, problem list and plan of care with the following additions:    Supine trial today  Repeat prone ventilation for this evening  Continue decadron  levaquin per ID, completed Remdesivir  Increase SSI for hyperglycemia  Supportive care    35 minutes of CCT spent with the patient, reviewing the chart including imaging studies, and discussing the case with other health care professionals. This time excludes procedures.      Mercedes Beltran MD

## 2020-11-17 NOTE — CARE COORDINATION
Updated discharge plan of care. Pt continue vent support, FIO2-80%, PEEP-14. Remdesivir completed on 11/16.  [facility]

## 2020-11-17 NOTE — PLAN OF CARE
Problem: Airway Clearance - Ineffective  Goal: Achieve or maintain patent airway  Outcome: Met This Shift     Problem: Gas Exchange - Impaired  Goal: Absence of hypoxia  Outcome: Met This Shift  Goal: Promote optimal lung function  Outcome: Met This Shift     Problem:  Body Temperature -  Risk of, Imbalanced  Goal: Ability to maintain a body temperature within defined limits  Outcome: Met This Shift     Problem: Falls - Risk of:  Goal: Will remain free from falls  Description: Will remain free from falls  Outcome: Met This Shift  Goal: Absence of physical injury  Description: Absence of physical injury  Outcome: Met This Shift

## 2020-11-18 NOTE — PATIENT CARE CONFERENCE
Intensive Care Daily Quality Rounding Checklist      ICU Team Members: Dr. Chris Hartley, Anish Latif & Melinda (residents), clinical pharmacist, charge nurse, bedside nurse, respiratory therapist    ICU Day #: NUMBER: 4    Intubation Date: November 15    Ventilator Day #: NUMBER: 4    Central Line Insertion Date: November 15        Day #: NUMBER: 4     Arterial Line Insertion Date: November 15      Day #: NUMBER: 4    DVT Prophylaxis: Lovenox     GI Prophylaxis: Protonix     Martinez Catheter Insertion Date: November 15       Day #: 4      Continued need (if yes, reason documented and discussed with physician): yes, strict I/O critically ill pt     Skin Issues/ Wounds and ordered treatment discussed on rounds: SOS precautions     Goals/ Plans for the Day: Daily labs, wean vent as able, try to supine

## 2020-11-18 NOTE — PLAN OF CARE
Problem: Airway Clearance - Ineffective  Goal: Achieve or maintain patent airway  11/18/2020 1450 by Daniele Cain RN  Outcome: Met This Shift  11/18/2020 0136 by Elías Buchanan RN  Outcome: Not Met This Shift     Problem: Gas Exchange - Impaired  Goal: Absence of hypoxia  11/18/2020 1450 by Daniele Cain RN  Outcome: Met This Shift  11/18/2020 0136 by Elías Buchanan RN  Outcome: Met This Shift     Problem: Body Temperature -  Risk of, Imbalanced  Goal: Ability to maintain a body temperature within defined limits  11/18/2020 1450 by Daniele Cain RN  Outcome: Met This Shift  11/18/2020 0136 by Elías Buchanan RN  Outcome: Met This Shift     Problem: Isolation Precautions - Risk of Spread of Infection  Goal: Prevent transmission of infection  11/18/2020 1450 by Daniele Cain RN  Outcome: Met This Shift  11/18/2020 0136 by Elías Buchanan RN  Outcome: Met This Shift     Problem: Risk for Fluid Volume Deficit  Goal: Maintain normal heart rhythm  11/18/2020 1450 by Daniele Cain RN  Outcome: Met This Shift  11/18/2020 0136 by Elías Buchanan RN  Outcome: Met This Shift  Goal: Maintain normal serum potassium, sodium, calcium, phosphorus, and pH  Outcome: Met This Shift     Problem: Falls - Risk of:  Goal: Will remain free from falls  Description: Will remain free from falls  11/18/2020 0136 by Jess Buchanan RN  Outcome: Met This Shift  Goal: Absence of physical injury  Description: Absence of physical injury  11/18/2020 0136 by Elías Buchanan RN  Outcome: Met This Shift     Problem: Pain:  Goal: Pain level will decrease  Description: Pain level will decrease  11/18/2020 0136 by Jess Buchanan RN  Outcome: Met This Shift     Problem: Skin Integrity:  Goal: Absence of new skin breakdown  Description: Absence of new skin breakdown  11/18/2020 0136 by Elías Buchanan RN  Outcome: Met This Shift

## 2020-11-18 NOTE — PROGRESS NOTES
Critical Care Team - Daily Progress Note         Date and time: 11/18/2020 5:34 PM  Patient's name:  Ant Hollingsworth Record Number: [id number]  Patient's account/billing number: [de-identified]  Patient's YOB: 1955  Age: 77 y.o. Date of Admission: 11/11/2020  2:12 PM  Length of stay during current admission: 7      Primary Care Physician: No primary care provider on file. ICU Attending Physician: Dr. Jillian Mckeon    Code Status: Full Code    Reason for ICU admission: Acute hypoxic respiratory failure secondary to COVID-19 pneumonia      SUBJECTIVE:     BRIEF HISTORY: History is gathered from medical records, patient was in respiratory distress on BiPAP, emergently being intubated on arrival in the ICU.     John pearce 65 y.o. arrived to the ED 11/11 with fever, nausea, vomiting.  He reports the symptoms began 6 days prior. Loss of appetite red green-white productive sputum, urinary frequency, diarrhea rhinorrhea, nasal congestion chest pain right upper quadrant abdominal pain.  He had been on a Z-Michael from his primary physician no improvement in symptoms he complains of right sided pleuritic chest pain with cough.  ABG within normal range, leukocytosis, lymphopenia elevated D-dimer greater than 5250 he was started on heparin drip, ferritin 1710 and admitted await ID consult for treatment will need remdesivir, infectious work-up.  He arrived hypoxic was placed on 15 L nonrebreather he denies history of PEs or DVTs      Patient was admitted to the hospital, was on BiPAP, OptiFlow. On 11/15/2020, patient was unable to obtain O2 sat above upper 80s, was in respiratory distress with respiratory rate of 40-50/min. Because of his increasing respiratory distress on BiPAP, patient was transferred to ICU, intubated. He has been followed by pulmonology, infectious disease throughout hospital stay.      OVERNIGHT EVENTS:    11/16/20: Some soft BPs in the evening after being proned, Silas-Synephrine Net 104.13 ml     Wt Readings from Last 2 Encounters:   11/18/20 234 lb 12.6 oz (106.5 kg)     Body mass index is 35.7 kg/m².         PHYSICAL EXAMINATION:  General: Intubated, sedated  Eyes: conjunctivae/corneas clear, sclera non icteric  Ears: no obvious scars, no lesions, no masses  Mouth: mucous membranes moist, no obvious oral sores  Head: normocephalic, atraumatic  Neck: no JVD, no adenopathy, no thyromegaly, neck is supple, trachea is midline  Lungs: clear to auscultation bilaterally, no wheezing, or rales  Heart: tachycardic, regular rhythm, no murmur, normal S1, S2  Abdomen: soft, non-tender; bowel sounds normal; no masses, no organomegaly  Extremities: mild  lower extremity edema, extremities atraumatic, no cyanosis, no clubbing, 2+ pedal pulses palpated  Skin: normal color, normal texture, normal turgor, no rashes, no lesions  Neurologic: unable to assess   MEDICATIONS:    Scheduled Meds:   midodrine  10 mg Oral TID WC    insulin lispro  0-12 Units Subcutaneous Q6H    dexamethasone  6 mg Oral Daily    pantoprazole  40 mg Intravenous Daily    And    sodium chloride (PF)  10 mL Intravenous Daily    enoxaparin  110 mg Subcutaneous BID    chlorhexidine  15 mL Mouth/Throat BID    influenza virus vaccine  0.5 mL Intramuscular Prior to discharge    sodium chloride flush  10 mL Intravenous 2 times per day    vitamin C  1,000 mg Oral BID    zinc sulfate  50 mg Oral BID    vitamin D3  400 Units Oral Daily     Continuous Infusions:   phenylephrine (JORDON-SYNEPHRINE) 50mg/250mL infusion 25 mcg/min (11/18/20 1524)    dextrose      vecuronium (NORCURON) infusion 1.3 mcg/kg/min (11/18/20 1240)    fentaNYL 5 mcg/ml in 0.9%  ml infusion 200 mcg/hr (11/18/20 1502)    midazolam 8 mg/hr (11/18/20 1006)     PRN Meds:   glucose, 15 g, PRN  dextrose, 12.5 g, PRN  glucagon (rDNA), 1 mg, PRN  dextrose, 100 mL/hr, PRN  sodium chloride, 30 mL, PRN  sodium chloride flush, 10 mL, PRN  acetaminophen, 650 mg, Q6H PRN    Or  acetaminophen, 650 mg, Q6H PRN  polyethylene glycol, 17 g, Daily PRN  promethazine, 12.5 mg, Q6H PRN    Or  ondansetron, 4 mg, Q6H PRN        VENT SETTINGS (Comprehensive) (if applicable):  Vent Information  $Ventilation: $Subsequent Day  Skin Assessment: Clean, dry, & intact  Equipment ID: [id number]  Vent Type: [phone]  Vent Mode: AC/VC  Vt Ordered: 500 mL  Rate Set: 30 bmp  Peak Flow: 85 L/min  Pressure Support: 0 cmH20  FiO2 : 60 %  SpO2: 95 %  SpO2/FiO2 ratio: 160  Sensitivity: 3  PEEP/CPAP: 14  I Time/ I Time %: 0 s  Humidification Source: Heated wire  Humidification Temp: 37  Humidification Temp Measured: 37  Circuit Condensation: Drained  Mask Type: Full face mask  Mask Size: Medium  Additional Respiratory  Assessments  Pulse: 83  Resp: 30  SpO2: 95 %  Position: Prone  Humidification Source: Heated wire  Humidification Temp: 37  Circuit Condensation: Drained  Oral Care: Mouth swabbed, Mouth moisturizer, Mouth suctioned  Subglottic Suction Done?: Yes  Cuff Pressure (cm H2O): 29 cm H2O    ABGs:   Recent Labs     11/18/20  1316   PH 7.432   PCO2 37.6   PO2 72.6*   HCO3 24.5   BE 0.5   O2SAT 94.2       Laboratory findings:    Complete Blood Count:   Recent Labs     11/16/20  0609 11/17/20  0510 11/18/20  0549   WBC 16.4* 14.7* 14.4*   HGB 13.5 13.0 13.4   HCT 42.5 39.4 41.0    386 383        Last 3 Blood Glucose:   Recent Labs     11/16/20  0609 11/17/20  0510 11/18/20  0549   GLUCOSE 226* 184* 195*        PT/INR:    Lab Results   Component Value Date    PROTIME 10.9 02/16/2012    INR 0.9 02/16/2012     PTT:    Lab Results   Component Value Date    APTT 103.3 11/16/2020       Comprehensive Metabolic Profile:   Recent Labs     11/16/20  0609 11/17/20  0510 11/18/20  0549    144 145   K 4.7 4.5 4.2    109* 110*   CO2 28 29 27   BUN 18 18 23   CREATININE 1.0 0.9 1.0   GLUCOSE 226* 184* 195*   CALCIUM 8.0* 8.1* 8.2*   PROT 6.2* 5.7* 5.8*   LABALBU 2.3* 2.4* 2.6*   BILITOT 0.7 0.5 0.5   ALKPHOS 123 122 100   AST 29 46* 43*   ALT 49* 42* 43*      Magnesium:   Lab Results   Component Value Date    MG 2.5 11/18/2020     Phosphorus:   Lab Results   Component Value Date    PHOS 3.2 11/18/2020     Ionized Calcium: No results found for: CAION       Troponin: No results for input(s): TROPONINI in the last 72 hours. Microbiology:  Cultures during this admission:     Blood cultures:                 [] None drawn      [x] Negative             []  Positive (Details:  )  Urine Culture:                   [x] None drawn      [] Negative             []  Positive (Details:  )  Sputum Culture:               [] None drawn       [] Negative             [x]  Positive (Details: ordered 11/16, pending )   Endotracheal aspirate:     [] None drawn       [] Negative             [x]  Positive (Details: ordered 11/16, pending )     Other pertinent Labs: Strep and legionella ag negative    Radiology/Imaging:     Chest x-ray:  11/16/20:   Persistent bilateral ground-glass opacity infiltrates throughout both lungs    with fine underline reticular pattern.  The infiltrates appears to be    somewhat increased since the previous examination of November 15.  No    conspicuous pleural effusions observed.  The heart is normal size.         RECOMMENDATION:    Further increase in bilateral infiltrates since November 15. ASSESSMENT:     Active Problems:    Hypoxia  Resolved Problems:    * No resolved hospital problems. *      Additional assessment:    COVID-19 pneumonia  Acute Respiratory failure secondary to COVID-19 pneumonia  Community acquired bacterial pneumonia  Hypotension -resolved        PLAN:     WEAN PER PROTOCOL:  [] No   [x] Yes  [] N/A    DISCONTINUE ANY LABS:   [x] No   [] Yes    ICU PROPHYLAXIS:  Stress ulcer:  [x] PPI Agent  [] A9Iqnht [] Sucralfate  [] Other:  VTE:  [x] Enoxaparin  [] Unfract.  Heparin Subcut  [] EPC Cuffs    NUTRITION:  [x] NPO [] Tube Feeding (Specify: ) [] TPN  [x] PO (Diet: Diet NPO Effective Now)    HOME MEDICATIONS RECONCILED: [] No  [] Yes    INSULIN DRIP:   [x] No   [] Yes    CONSULTATION NEEDED:  [x] No   [] Yes    FAMILY UPDATED:    [] No   [] Yes    TRANSFER OUT OF ICU:   [x] No   [] Yes    ADDITIONAL PLAN:  SYSTEMS ASSESSMENT     Neuro   Intubated, sedated, no acute issues     Respiratory   Acute hypoxic respiratory failure secondary to COVID-19 pneumonia  -Was initially on BiPAP, was intubated 11/15  -ID following, currently on remdesivir  -levofloxacin for presumed community-acquired pneumonia superimposed on COVID-19 PNA  -Patient flipped supine today for most of the day. Repeat gases only mildly worsening. Will place patient back to prone for 1 more night. Superimposed PNA- levaquin day 7     Cardiovascular   Hypotension-    -Off giancarlo all day. On midodrine. Monitor pressures.     Gastrointestinal   No acute issues  - Consult dietary for tube feeding      Renal   Hypophs-replace     Infectious Disease   COVID-19 pneumonia  On Decadron, decreased to daily. , day 8, remdesivir-finished  Presumed community-acquired pneumonia, followed by ID, on levofloxacin     Hematology/Oncology   Hypercoagulability secondary to Covid pneumonia   -Dimer increased, continue therapeutic dose Lovenox     Endocrine   Hyperglycemia related to steroids -increase insulin to medium scale     Social/Spiritual/DNR/Other   Full code      Diet: Diet NPO Effective Now  CODE Status: Full Code    Dispo: Maintain ICU level of care    1. As above  2. Supine patient, repeat ABG, repeat proning  3. Wean FiO2  4. Dietary recs for tube feeding  5. GI prophylaxis - protonix  6. DVT Prophylaxis - lovenox BID  7. Discuss case and plan with attending, Dr. Caleb Latif, DO  Resident, PGY-2  11/18/2020  5:34 PM     I personally saw, examined and provided care for the patient. Radiographs, labs and medication list were reviewed by me independently. I spoke with bedside nursing, therapists and consultants.  Critical care services and times documented are independent of procedures and multidisciplinary rounds with Residents. Additionally comprehensive, multidisciplinary rounds were conducted with the MICU team. The case was discussed in detail and plans for care were established. Review of Residents documentation was conducted and revisions were made as appropriate. I agree with the above documented exam, problem list and plan of care with the following additions:    Supine trial today tolerated well  Repeat prone tonight, hopefully last time  Would like to stop paralytic tomorrow  Last day of levaquin  Continue decadron  Supportive care    37 minutes of CCT spent with the patient, reviewing the chart including imaging studies, and discussing the case with other health care professionals. This time excludes procedures.      Mary Kay Sutherland MD

## 2020-11-18 NOTE — PROGRESS NOTES
[address] [place] [address] [facility]  [facility]  Progress Note    SUBJECTIVE:  Chief Complaint   Patient presents with    Shortness of Breath     76% room air at arrival     Fever    Emesis     No major changes. The patient is still in the ICU. He is still on a ventilator. Still paralyzed. Review of systems:  As stated above in the chief complaint, otherwise negative. Medications:  Scheduled Meds:   midodrine  10 mg Oral TID WC    insulin lispro  0-12 Units Subcutaneous Q6H    dexamethasone  6 mg Oral Daily    pantoprazole  40 mg Intravenous Daily    And    sodium chloride (PF)  10 mL Intravenous Daily    enoxaparin  110 mg Subcutaneous BID    chlorhexidine  15 mL Mouth/Throat BID    influenza virus vaccine  0.5 mL Intramuscular Prior to discharge    levofloxacin  750 mg Intravenous Q24H    sodium chloride flush  10 mL Intravenous 2 times per day    vitamin C  1,000 mg Oral BID    zinc sulfate  50 mg Oral BID    vitamin D3  400 Units Oral Daily     Continuous Infusions:   dextrose      vecuronium (NORCURON) infusion 1 mcg/kg/min (20)    fentaNYL 5 mcg/ml in 0.9%  ml infusion 200 mcg/hr (20 7879)    midazolam 8 mg/hr (20)    phenylephrine (JORDON-SYNEPHRINE) 50mg/250mL infusion Stopped (20)     PRN Meds:glucose, dextrose, glucagon (rDNA), dextrose, sodium chloride, sodium chloride flush, acetaminophen **OR** acetaminophen, polyethylene glycol, promethazine **OR** ondansetron    OBJECTIVE:  /69   Pulse 103   Temp 97.4 °F (36.3 °C) (Bladder)   Resp 29   Ht 5' 8\" (1.727 m)   Wt 234 lb 12.6 oz (106.5 kg)   SpO2 96%   BMI 35.70 kg/m²   Temp  Av °F (36.1 °C)  Min: 96.4 °F (35.8 °C)  Max: 97.9 °F (36.6 °C)  Constitutional: The patient is lying in bed in the ICU. He is pronated. He is intubated, sedated and paralyzed. He is on a ventilator. Physical exam is limited. FiO2 60%. PEEP 14. Skin: Dry. No rashes were noted. HEENT: ET tube. OG tube. Neck: Supple to movements. Chest: Good breath sounds bilaterally. No crackles were heard. Cardiovascular: Heart sounds rhythmic and regular. Abdomen: Could not be examined. Extremities: No edema. Lines: Right IJ TLC 11/15/2020. Right radial arterial line 11/15/2020. Laboratory and Tests Review:  Lab Results   Component Value Date    WBC 14.4 (H) 11/18/2020    WBC 14.7 (H) 11/17/2020    WBC 16.4 (H) 11/16/2020    HGB 13.4 11/18/2020    HCT 41.0 11/18/2020    MCV 86.9 11/18/2020     11/18/2020     Lab Results   Component Value Date    NEUTROABS 13.10 (H) 11/18/2020    NEUTROABS 13.52 (H) 11/17/2020    NEUTROABS 15.58 (H) 11/16/2020     No results found for: [facility]  Lab Results   Component Value Date    ALT 43 (H) 11/18/2020    AST 43 (H) 11/18/2020    ALKPHOS 100 11/18/2020    BILITOT 0.5 11/18/2020     Lab Results   Component Value Date     11/18/2020    K 4.2 11/18/2020    K 3.7 11/15/2020     11/18/2020    CO2 27 11/18/2020    BUN 23 11/18/2020    CREATININE 1.0 11/18/2020    CREATININE 0.9 11/17/2020    CREATININE 1.0 11/16/2020    GFRAA >60 11/18/2020    LABGLOM >60 11/18/2020    GLUCOSE 195 11/18/2020    GLUCOSE 154 02/16/2012    PROT 5.8 11/18/2020    LABALBU 2.6 11/18/2020    CALCIUM 8.2 11/18/2020    BILITOT 0.5 11/18/2020    ALKPHOS 100 11/18/2020    AST 43 11/18/2020    ALT 43 11/18/2020     Lab Results   Component Value Date    CRP 8.6 (H) 11/17/2020    CRP 12.4 (H) 11/15/2020    CRP 27.8 (H) 11/12/2020     Lab Results   Component Value Date    SEDRATE 86 (H) 11/12/2020     Radiology:  Chest x-ray shows bilateral interstitial infiltrates    Microbiology:   Streptococcus pneumoniae/Legionella urine Ag: Negative  Blood cultures 11/11/2020: Negative x2  Respiratory culture 11/16/2020: OP nikita reduced  Nares screen MRSA: Pending    Recent Labs     11/15/20  1330   PROCAL 0.46*       ASSESSMENT:  · Moderate to severe SARS-CoV-2 infection with pneumonia. Completed 5 days of Remdesivir 11/16/2020. CRP is fluctuating. · Acute respiratory failure  · Lymphopenia secondary to SARS-CoV-2 infection  · Leukocytosis associated to the above and steroids, stable  · Probable community-acquired pneumonia.   Completed 7 days of Levofloxacin  · Elevation of transaminases associated to the above, fluctuating    PLAN:  · Stop Levofloxacin after today's dose  · Continue daily dexamethasone  · Anticoagulation  · Respiratory cultures were ordered yesterday    Lul Dunn  9:27 AM  11/18/2020

## 2020-11-18 NOTE — PLAN OF CARE
Problem: Gas Exchange - Impaired  Goal: Absence of hypoxia  11/18/2020 0136 by Marco Buchanan RN  Outcome: Met This Shift  11/17/2020 1838 by Jesus Mckeon RN  Outcome: Met This Shift     Problem: Body Temperature -  Risk of, Imbalanced  Goal: Ability to maintain a body temperature within defined limits  11/18/2020 0136 by Jess Buchanan RN  Outcome: Met This Shift  11/17/2020 1838 by Jesus Mckeon RN  Outcome: Met This Shift     Problem: Isolation Precautions - Risk of Spread of Infection  Goal: Prevent transmission of infection  11/18/2020 0136 by Marco Buchanan RN  Outcome: Met This Shift  11/17/2020 1838 by Jesus Mckeon RN  Outcome: Met This Shift     Problem: Risk for Fluid Volume Deficit  Goal: Maintain normal heart rhythm  11/18/2020 0136 by Marco Buchanan RN  Outcome: Met This Shift  11/17/2020 1838 by Jesus Mckeon RN  Outcome: Met This Shift  Goal: Maintain normal serum potassium, sodium, calcium, phosphorus, and pH  11/17/2020 1838 by Jesus Mckeon RN  Outcome: Met This Shift     Problem: Patient Education: Go to Patient Education Activity  Goal: Patient/Family Education  11/17/2020 1838 by Jesus Mckeon RN  Outcome: Met This Shift     Problem: Falls - Risk of:  Goal: Will remain free from falls  Description: Will remain free from falls  11/18/2020 0136 by Jess Buchanan RN  Outcome: Met This Shift  11/17/2020 1838 by Jesus Mckeon RN  Outcome: Met This Shift  Goal: Absence of physical injury  Description: Absence of physical injury  11/18/2020 0136 by Marco Buchanan RN  Outcome: Met This Shift  11/17/2020 1838 by Jesus Mckeon RN  Outcome: Met This Shift     Problem: Pain:  Goal: Pain level will decrease  Description: Pain level will decrease  Outcome: Met This Shift     Problem: Skin Integrity:  Goal: Absence of new skin breakdown  Description: Absence of new skin breakdown  Outcome: Met This Shift     Problem: Airway Clearance - Ineffective  Goal: Achieve or maintain

## 2020-11-18 NOTE — PROGRESS NOTES
[facility] Progress Note    Admitting Date and Time: 11/11/2020  2:12 PM  Admit Dx: Hypoxia [R09.02]  Hypoxia [R09.02]    Subjective:  Patient is being followed for Hypoxia [R09.02]  Hypoxia [R09.02]   Pt is intubated and paralyzed, pronated. Still hypoxic and hypotensive on pressors giancarlo    ROS: denies fever, chills, cp, n/v, HA unless stated above.       midodrine  10 mg Oral TID WC    insulin lispro  0-12 Units Subcutaneous Q6H    dexamethasone  6 mg Oral Daily    pantoprazole  40 mg Intravenous Daily    And    sodium chloride (PF)  10 mL Intravenous Daily    enoxaparin  110 mg Subcutaneous BID    chlorhexidine  15 mL Mouth/Throat BID    influenza virus vaccine  0.5 mL Intramuscular Prior to discharge    levofloxacin  750 mg Intravenous Q24H    sodium chloride flush  10 mL Intravenous 2 times per day    vitamin C  1,000 mg Oral BID    zinc sulfate  50 mg Oral BID    vitamin D3  400 Units Oral Daily     glucose, 15 g, PRN  dextrose, 12.5 g, PRN  glucagon (rDNA), 1 mg, PRN  dextrose, 100 mL/hr, PRN  sodium chloride, 30 mL, PRN  sodium chloride flush, 10 mL, PRN  acetaminophen, 650 mg, Q6H PRN    Or  acetaminophen, 650 mg, Q6H PRN  polyethylene glycol, 17 g, Daily PRN  promethazine, 12.5 mg, Q6H PRN    Or  ondansetron, 4 mg, Q6H PRN         Objective:    /70   Pulse 101   Temp 97.3 °F (36.3 °C) (Bladder)   Resp 30   Ht 5' 8\" (1.727 m)   Wt 234 lb 12.6 oz (106.5 kg)   SpO2 92%   BMI 35.70 kg/m²     General Appearance: intubated and paralyzed, pronated  Skin: warm and dry  Head: normocephalic and atraumatic  Eyes: pupils equal, round, and reactive to light, extraocular eye movements intact, conjunctivae normal  Neck: neck supple and non tender without mass   Pulmonary/Chest: diminished  bilaterally- no wheezes, rales or rhonchi, normal air movement  Cardiovascular: normal rate, prontaed  Extremities: no cyanosis, no clubbing and no edema  Neurologic: intubated and paralyzed        Recent Labs     11/16/20  0609 11/17/20  0510 11/18/20  0549    144 145   K 4.7 4.5 4.2    109* 110*   CO2 28 29 27   BUN 18 18 23   CREATININE 1.0 0.9 1.0   GLUCOSE 226* 184* 195*   CALCIUM 8.0* 8.1* 8.2*       Recent Labs     11/16/20  0609 11/17/20  0510 11/18/20  0549   WBC 16.4* 14.7* 14.4*   RBC 4.85 4.54 4.72   HGB 13.5 13.0 13.4   HCT 42.5 39.4 41.0   MCV 87.6 86.8 86.9   MCH 27.8 28.6 28.4   MCHC 31.8* 33.0 32.7   RDW 15.2* 15.3* 15.5*    386 383   MPV 10.6 10.2 10.4         Assessment:    Active Problems:    Hypoxia  Resolved Problems:    * No resolved hospital problems. *      Plan:  1. Acute hypoxic respiratory failure due to COVID-19 pneumonia, presented with oxygen saturation 76% on room air, intubated and paralyzed, on pressors, currently on 60% FiO2 with O2 sat 92%, will attempt to place him in supine position  2. Acute COVID-19 pneumonia, start the patient on Decadron, ID was consulted for remdesivir. Patient might be in cytokine storm, I contacted oncology patient might need to be started on Tocilizumab, intubated and paralyzed, on pressors, remdesivir was stopped  3. Superimposed bacterial pneumonia, procalcitonin is elevated at 0.6, added levaquin  4. Suspecting cytokine storm with elevated D-dimer, started full dose anticoagulation. 5.  Acute renal failure, presented with creatinine of 2.4 not sure about the patient's baseline he might have CKD. Patient received IV fluids in the ED and creatinine trended down to 1.2.  . 6. Elevated transaminases, most likely due to COVID-19 infection and possible cytokine storm, trending down, continue to monitor. 7. Septic shock developed during the admission, due to COVID 19 pneumonia, was on pressors, improving now off pressors      NOTE: This report was transcribed using voice recognition software.  Every effort was made to ensure accuracy; however, inadvertent computerized transcription errors may be present.   Electronically signed by Trish Schmid MD on 11/18/2020 at 7:14 AM

## 2020-11-19 NOTE — PLAN OF CARE
Problem: Airway Clearance - Ineffective  Goal: Achieve or maintain patent airway  11/19/2020 0104 by Param Buchanan RN  Outcome: Met This Shift  11/18/2020 1450 by Yamilet Crane RN  Outcome: Met This Shift     Problem: Gas Exchange - Impaired  Goal: Absence of hypoxia  11/19/2020 0104 by Param Buchanan RN  Outcome: Met This Shift  11/18/2020 1450 by Yamilet Crane RN  Outcome: Met This Shift     Problem: Breathing Pattern - Ineffective  Goal: Ability to achieve and maintain a regular respiratory rate  Outcome: Met This Shift     Problem: Body Temperature -  Risk of, Imbalanced  Goal: Ability to maintain a body temperature within defined limits  11/19/2020 0104 by Jess Buchanan RN  Outcome: Met This Shift  11/18/2020 1450 by Yamilet Crane RN  Outcome: Met This Shift     Problem: Isolation Precautions - Risk of Spread of Infection  Goal: Prevent transmission of infection  11/19/2020 0104 by Param Buchanan RN  Outcome: Met This Shift  11/18/2020 1450 by Yamilet Crane RN  Outcome: Met This Shift     Problem: Nutrition Deficits  Goal: Optimize nutrtional status  11/19/2020 0104 by Param Buchanan RN  Outcome: Met This Shift  11/18/2020 1450 by Yamilet Crane RN  Outcome: Not Met This Shift     Problem: Risk for Fluid Volume Deficit  Goal: Maintain normal heart rhythm  11/18/2020 1450 by Yamilet Crane RN  Outcome: Met This Shift  Goal: Maintain normal serum potassium, sodium, calcium, phosphorus, and pH  11/18/2020 1450 by Yamilet Crane RN  Outcome: Met This Shift     Problem: Falls - Risk of:  Goal: Will remain free from falls  Description: Will remain free from falls  Outcome: Met This Shift  Goal: Absence of physical injury  Description: Absence of physical injury  Outcome: Met This Shift     Problem: Pain:  Goal: Pain level will decrease  Description: Pain level will decrease  Outcome: Met This Shift     Problem: Skin Integrity:  Goal: Absence of new skin breakdown  Description: Absence of new skin breakdown  Outcome: Met This Shift     Problem: Gas Exchange - Impaired  Goal: Promote optimal lung function  Outcome: Not Met This Shift     Problem: Body Temperature -  Risk of, Imbalanced  Goal: Will regain or maintain usual level of consciousness  11/19/2020 0104 by Jess Buchanan RN  Outcome: Not Met This Shift  11/18/2020 1450 by Bel Morejon RN  Outcome: Not Met This Shift  Goal: Complications related to the disease process, condition or treatment will be avoided or minimized  Outcome: Not Met This Shift     Problem: Skin Integrity:  Goal: Will show no infection signs and symptoms  Description: Will show no infection signs and symptoms  Outcome: Not Met This Shift

## 2020-11-19 NOTE — PROGRESS NOTES
[address] [place] [address] [facility]  Progress Note    SUBJECTIVE:  Chief Complaint   Patient presents with    Shortness of Breath     76% room air at arrival     Fever    Emesis     The patient is still in the ICU. Tolerating supination. Still sedated and paralyzed. Review of systems:  As stated above in the chief complaint, otherwise negative. Medications:  Scheduled Meds:   midodrine  10 mg Oral TID WC    insulin lispro  0-12 Units Subcutaneous Q6H    dexamethasone  6 mg Oral Daily    pantoprazole  40 mg Intravenous Daily    And    sodium chloride (PF)  10 mL Intravenous Daily    enoxaparin  110 mg Subcutaneous BID    chlorhexidine  15 mL Mouth/Throat BID    influenza virus vaccine  0.5 mL Intramuscular Prior to discharge    sodium chloride flush  10 mL Intravenous 2 times per day    vitamin C  1,000 mg Oral BID    zinc sulfate  50 mg Oral BID    vitamin D3  400 Units Oral Daily     Continuous Infusions:   phenylephrine (JORDON-SYNEPHRINE) 50mg/250mL infusion Stopped (20 2283)    dextrose      vecuronium (NORCURON) infusion 1.3 mcg/kg/min (20 0473)    fentaNYL 5 mcg/ml in 0.9%  ml infusion 200 mcg/hr (20 2109)    midazolam 6 mg/hr (20 8789)     PRN Meds:glucose, dextrose, glucagon (rDNA), dextrose, sodium chloride, sodium chloride flush, acetaminophen **OR** acetaminophen, polyethylene glycol, promethazine **OR** ondansetron    OBJECTIVE:  BP (!) 99/59   Pulse 97   Temp 96.8 °F (36 °C) (Bladder)   Resp 26   Ht 5' 8\" (1.727 m)   Wt 233 lb 11 oz (106 kg)   SpO2 95%   BMI 35.53 kg/m²   Temp  Av.1 °F (36.2 °C)  Min: 96.6 °F (35.9 °C)  Max: 97.6 °F (36.4 °C)  Constitutional: The patient is lying in bed. He is intubated, sedated and paralyzed. He is supinated. FiO2 60%. PEEP 12. Skin: Dry. No rashes were noted. HEENT: Round pupils. No jaundice. ET tube. OG tube. Neck: Supple to movements.    Chest: Good breath sounds fluctuating. · Acute respiratory failure  · Lymphopenia secondary to SARS-CoV-2 infection  · Leukocytosis associated to the above and steroids, stable  · Probable community-acquired pneumonia.   Completed 7 days of Levofloxacin 11/18/2020  · Elevation of transaminases associated to the above, fluctuating    PLAN:  · Continue daily dexamethasone  · Anticoagulation  · Supportive treatment    Matthew Duran  9:42 AM  11/19/2020

## 2020-11-19 NOTE — PROGRESS NOTES
[facility]   Progress Note    Admitting Date and Time: 11/11/2020  2:12 PM  Admit Dx: Hypoxia [R09.02]  Hypoxia [R09.02]    Subjective: Admitted on 11th, presented with shortness of breath, hypoxic, prior tobacco abuse, initial impression of Covid pneumonia, also THOMAS. Patient was started on dexamethasone. Did have evidence of elevated transaminases. Seen by pulmonary, severe COVID-19 with possible bacterial pneumonia. Patient with multifocal groundglass opacities. D-dimer more than 5250, patient remains on heparin drip. Also received remdesivir as per ID, for full  5 days. Recommendations. On NIV as of 14th. Patient also remained on vitamin C as well as zinc.  Required intubation on 15th. Which improving hypotension patient is off pressors as of 18th. Patient was admitted with Hypoxia [R09.02]  Hypoxia [R09.02]. Patient resting, comfortable, paralyzed, in ICU, on vent, under airborne isolation. Per RN: Just changed to supine position, has been started on tube feeding, likely paralytics may be decreased. ROS: denies fever, chills, cp, sob, n/v, HA unless stated above.      insulin lispro  0-18 Units Subcutaneous Q6H    furosemide  40 mg Intravenous BID    midodrine  10 mg Oral TID WC    dexamethasone  6 mg Oral Daily    pantoprazole  40 mg Intravenous Daily    And    sodium chloride (PF)  10 mL Intravenous Daily    enoxaparin  110 mg Subcutaneous BID    chlorhexidine  15 mL Mouth/Throat BID    influenza virus vaccine  0.5 mL Intramuscular Prior to discharge    sodium chloride flush  10 mL Intravenous 2 times per day    vitamin C  1,000 mg Oral BID    zinc sulfate  50 mg Oral BID    vitamin D3  400 Units Oral Daily     glucose, 15 g, PRN  dextrose, 12.5 g, PRN  glucagon (rDNA), 1 mg, PRN  dextrose, 100 mL/hr, PRN  sodium chloride, 30 mL, PRN  sodium chloride flush, 10 mL, PRN  acetaminophen, 650 mg, Q6H PRN    Or  acetaminophen, 650 mg, Q6H PRN  polyethylene glycol, 17 g, Daily PRN  promethazine, 12.5 mg, Q6H PRN    Or  ondansetron, 4 mg, Q6H PRN         Objective:    BP (!) 99/59   Pulse 97   Temp 96.8 °F (36 °C) (Bladder)   Resp 26   Ht 5' 8\" (1.727 m)   Wt 233 lb 11 oz (106 kg)   SpO2 95%   BMI 35.53 kg/m²     Due to the patient's COVID-19-positive status, to reduce exposure, contamination, and in an effort to conserve PPE, this patient was evaluated through a combination of review of the medical record, nursing assessments, other physicians' exams and assessments, as well as telemedicine interaction. The limited examination to include heart sounds S1 and S2, lungs with scattered crackles. Abdomen is soft, nontender. Trace edema. Recent Labs     11/17/20  0510 11/18/20  0549 11/19/20  0553    145 147*   K 4.5 4.2 4.6   * 110* 112*   CO2 29 27 28   BUN 18 23 31*   CREATININE 0.9 1.0 1.0   GLUCOSE 184* 195* 181*   CALCIUM 8.1* 8.2* 8.4*       Recent Labs     11/17/20  0510 11/18/20  0549 11/19/20  0553   WBC 14.7* 14.4* 13.2*   RBC 4.54 4.72 4.62   HGB 13.0 13.4 13.0   HCT 39.4 41.0 41.2   MCV 86.8 86.9 89.2   MCH 28.6 28.4 28.1   MCHC 33.0 32.7 31.6*   RDW 15.3* 15.5* 15.8*    383 371   MPV 10.2 10.4 10.6     CRP 1.4  Sodium 147 /chloride 112  WBC 13.2  Hemoglobin 13  Blood glucose in the range of 150-223  Last       Radiology:   XR CHEST PORTABLE   Final Result   No change. XR CHEST 1 VIEW   Final Result   1. No interval change in the multifocal bilateral pulmonary infiltrates. XR CHEST PORTABLE   Final Result   Persistent bilateral ground-glass opacity infiltrates throughout both lungs   with fine underline reticular pattern. The infiltrates appears to be   somewhat increased since the previous examination of November 15. No   conspicuous pleural effusions observed. The heart is normal size. RECOMMENDATION:   Further increase in bilateral infiltrates since November 15.          XR CHEST PORTABLE   Final Result Support devices in good position. Persistent interstitial and airspace   opacities in both lungs. XR ABDOMEN FOR NG/OG/NE TUBE PLACEMENT   Final Result   Support devices in good position. Persistent interstitial and airspace   opacities in both lungs. CT CHEST WO CONTRAST   Final Result   Multifocal confluent and ground-glass airspace disease in the lungs   bilaterally most consistent with multifocal pneumonia. Emphysematous changes in the lungs with peripheral bulla. Nonspecific mediastinal nodes, which may be reactive. Limited evaluation without intravenous contrast.         CT ABDOMEN PELVIS WO CONTRAST Additional Contrast? None   Final Result   No evidence of acute intra-abdominal pathology. XR CHEST PORTABLE   Final Result   1. Significant bilateral multifocal pulmonary infiltrates             Assessment:    Active Problems:    Hypoxia  Resolved Problems:    * No resolved hospital problems. *      Plan:  1. Acute hypoxic respiratory failure, requiring support with ventilator, patient remains in ICU, under care from critical care. 2.          More due to COVID-19 pneumonia, patient has been seen by ID, completed therapy with remdesivir, patient remains on Decadron, improvement in CRP. 3.          Patient with significantly elevated D-dimer, was on IV heparin, now remains on Lovenox 110 mg twice a day. 4.          Suspected bacterial pneumonia, patient does remain on Levaquin. 5.          THOMAS, creatinine has improved back to normal level. 6.           Borderline BP, remains on midodrine. 7.           On GI prophylaxis.         Electronically signed by Alta Rico MD on 11/19/2020 at 12:15 PM

## 2020-11-19 NOTE — CARE COORDINATION
Updated discharge plan of care. Cont vent support FIO2-60% PEEP-12. remdesivir completed. Cont gtt's. [facility] following.  COVID positive-mjo

## 2020-11-19 NOTE — PATIENT CARE CONFERENCE
Intensive Care Daily Quality Rounding Checklist      ICU Team Members: Dr. Jeff Rdz, clinical pharmacist, charge nurse, bedside nurse, respiratory therapist    ICU Day #: NUMBER: 5    Intubation Date: November 15    Ventilator Day #: NUMBER: 5    Central Line Insertion Date: November 15        Day #: NUMBER: 5     Arterial Line Insertion Date: November 15      Day #: NUMBER: 5    DVT Prophylaxis: Lovenox     GI Prophylaxis: Protonix     Martinez Catheter Insertion Date: November 15       Day #: 5      Continued need (if yes, reason documented and discussed with physician): yes, strict I/O, critically ill pt     Skin Issues/ Wounds and ordered treatment discussed on rounds: SOS precautions     Goals/ Plans for the Day: Daily labs, wean vent as able, continue tube feeds, attempt to supine patient, update family

## 2020-11-19 NOTE — PROGRESS NOTES
Critical Care Team - Daily Progress Note         Date and time: 11/19/2020 3:03 PM  Patient's name:  Jazmyne Mustafa Record Number: [id number]  Patient's account/billing number: [de-identified]  Patient's YOB: 1955  Age: 77 y.o. Date of Admission: 11/11/2020  2:12 PM  Length of stay during current admission: 8      Primary Care Physician: No primary care provider on file. ICU Attending Physician: Dr. Edmund Germain    Code Status: Full Code    Reason for ICU admission: Acute hypoxic respiratory failure secondary to COVID-19 pneumonia      SUBJECTIVE:     BRIEF HISTORY: History is gathered from medical records, patient was in respiratory distress on BiPAP, emergently being intubated on arrival in the ICU.     Gabriela pearce 65 y.o. arrived to the ED 11/11 with fever, nausea, vomiting.  He reports the symptoms began 6 days prior. Loss of appetite red green-white productive sputum, urinary frequency, diarrhea rhinorrhea, nasal congestion chest pain right upper quadrant abdominal pain.  He had been on a Z-Michael from his primary physician no improvement in symptoms he complains of right sided pleuritic chest pain with cough.  ABG within normal range, leukocytosis, lymphopenia elevated D-dimer greater than 5250 he was started on heparin drip, ferritin 1710 and admitted await ID consult for treatment will need remdesivir, infectious work-up.  He arrived hypoxic was placed on 15 L nonrebreather he denies history of PEs or DVTs      Patient was admitted to the hospital, was on BiPAP, OptiFlow. On 11/15/2020, patient was unable to obtain O2 sat above upper 80s, was in respiratory distress with respiratory rate of 40-50/min. Because of his increasing respiratory distress on BiPAP, patient was transferred to ICU, intubated. He has been followed by pulmonology, infectious disease throughout hospital stay.      OVERNIGHT EVENTS:    11/16/20: Some soft BPs in the evening after being proned, Silas-Synephrine started. 20: Patient supine. Will attempt 6 hours and switch back to prone. 20: Patient prone overnight. Sit to supine this morning. Tolerated supine throughout the day. Put back to prone for 1 more night. 20: Patient switched to supine. Will repeat gas. Off of pressors. CURRENT VENTILATION STATUS:     [x] Ventilator  [] BIPAP  [] Nasal Cannula [] Room Air        SECRETIONS : Amount:  [] Small [] Moderate  [] Large  [x] None  Color:     [] White [] Colored  [] Bloody    SEDATION:    [] Propofol gtt  [x] Versed gtt & fentanyl gtt [] Ativan gtt   [] No Sedation    PARALYZED:  [] No    [x] Yes      VASOPRESSORS:  [] No    [x] Yes    If yes -   [] Levophed       [] Dopamine     [] Vasopressin       [] Dobutamine  [x] Phenylephrine         [] Epinephrine    CENTRAL LINES:     [] No   [] Yes   (Date of Insertion:  11/15 )           If yes -     [x] Right IJ     [] Left IJ [] Right Femoral [] Left Femoral                   [] Right Subclavian [] Left Subclavian       HANSON'S CATHETER:   [] No   [x] Yes  (Date of Insertion:  11/15 )     URINE OUTPUT:            [x] Good   [] Low              [] Anuric      OBJECTIVE:     VITAL SIGNS:  BP (!) 99/59   Pulse 97   Temp 96.8 °F (36 °C) (Bladder)   Resp 26   Ht 5' 8\" (1.727 m)   Wt 233 lb 11 oz (106 kg)   SpO2 95%   BMI 35.53 kg/m²   Tmax over 24 hours:  Temp (24hrs), Av.9 °F (36.1 °C), Min:96.6 °F (35.9 °C), Max:97.3 °F (36.3 °C)      No data found. Intake/Output Summary (Last 24 hours) at 2020 1503  Last data filed at 2020 0629  Gross per 24 hour   Intake 1553 ml   Output 1425 ml   Net 128 ml     Wt Readings from Last 2 Encounters:   20 233 lb 11 oz (106 kg)     Body mass index is 35.53 kg/m². PHYSICAL EXAMINATION:  General: Intubated, sedated, supine  Eyes: conjunctivae/corneas clear, sclera non icteric  Ears: no obvious scars, no lesions, no masses  Mouth: mucous membranes moist, no obvious oral sores.   ET tube in place  Head: normocephalic, atraumatic  Neck: no JVD, trachea midline  Lungs: clear to auscultation bilaterally, no wheezing, or rales  Heart: tachycardic, regular rhythm, no murmur, normal S1, S2  Abdomen: soft, non-tender; bowel sounds normal; no masses, no organomegaly  Extremities: mild  lower extremity edema, extremities atraumatic, no cyanosis, no clubbing, 2+ pedal pulses palpated  Skin: normal color, normal texture, normal turgor, no rashes, no lesions  Neurologic: unable to assess   MEDICATIONS:    Scheduled Meds:   insulin lispro  0-18 Units Subcutaneous Q6H    furosemide  40 mg Intravenous BID    midodrine  10 mg Oral TID WC    dexamethasone  6 mg Oral Daily    pantoprazole  40 mg Intravenous Daily    And    sodium chloride (PF)  10 mL Intravenous Daily    enoxaparin  110 mg Subcutaneous BID    chlorhexidine  15 mL Mouth/Throat BID    influenza virus vaccine  0.5 mL Intramuscular Prior to discharge    sodium chloride flush  10 mL Intravenous 2 times per day    vitamin C  1,000 mg Oral BID    zinc sulfate  50 mg Oral BID    vitamin D3  400 Units Oral Daily     Continuous Infusions:   dextrose      vecuronium (NORCURON) infusion 1.3 mcg/kg/min (11/19/20 0837)    fentaNYL 5 mcg/ml in 0.9%  ml infusion 200 mcg/hr (11/19/20 1313)    midazolam 6 mg/hr (11/19/20 0422)     PRN Meds:   glucose, 15 g, PRN  dextrose, 12.5 g, PRN  glucagon (rDNA), 1 mg, PRN  dextrose, 100 mL/hr, PRN  sodium chloride, 30 mL, PRN  sodium chloride flush, 10 mL, PRN  acetaminophen, 650 mg, Q6H PRN    Or  acetaminophen, 650 mg, Q6H PRN  polyethylene glycol, 17 g, Daily PRN  promethazine, 12.5 mg, Q6H PRN    Or  ondansetron, 4 mg, Q6H PRN        VENT SETTINGS (Comprehensive) (if applicable):  Vent Information  $Ventilation: $Subsequent Day  Skin Assessment: Clean, dry, & intact  Equipment ID: [id number]  Vent Type: [phone]  Vent Mode: AC/VC  Vt Ordered: 500 mL  Rate Set: 26 bmp  Peak Flow: 85 L/min  Pressure Support: [x] Negative             []  Positive (Details:  )  Urine Culture:                   [x] None drawn      [] Negative             []  Positive (Details:  )  Sputum Culture:               [] None drawn       [] Negative             [x]  Positive (Details: ordered 11/16, pending )   Endotracheal aspirate:     [] None drawn       [] Negative             [x]  Positive (Details: ordered 11/16, pending )     Other pertinent Labs: Strep and legionella ag negative    Radiology/Imaging:     Chest x-ray:  11/16/20:   Persistent bilateral ground-glass opacity infiltrates throughout both lungs    with fine underline reticular pattern.  The infiltrates appears to be    somewhat increased since the previous examination of November 15.  No    conspicuous pleural effusions observed.  The heart is normal size.         RECOMMENDATION:    Further increase in bilateral infiltrates since November 15. ASSESSMENT:     Active Problems:    Hypoxia  Resolved Problems:    * No resolved hospital problems. *      Additional assessment:    COVID-19 pneumonia  Acute Respiratory failure secondary to COVID-19 pneumonia  Community acquired bacterial pneumonia  Hypotension -resolved        PLAN:     WEAN PER PROTOCOL:  [] No   [x] Yes  [] N/A    DISCONTINUE ANY LABS:   [x] No   [] Yes    ICU PROPHYLAXIS:  Stress ulcer:  [x] PPI Agent  [] [id number] [] Sucralfate  [] Other:  VTE:  [x] Enoxaparin  [] Unfract. Heparin Subcut  [] EPC Cuffs    NUTRITION:  [x] NPO [] Tube Feeding (Specify: ) [] TPN  [x] PO (Diet: DIET TUBE FEED CONTINUOUS/CYCLIC NPO; Semi-elemental; Orogastric; Continuous; 20; 20)    HOME MEDICATIONS RECONCILED: [] No  [] Yes    INSULIN DRIP:   [x] No   [] Yes    CONSULTATION NEEDED:  [x] No   [] Yes    FAMILY UPDATED:    [] No   [] Yes    TRANSFER OUT OF ICU:   [x] No   [] Yes    ADDITIONAL PLAN:  SYSTEMS ASSESSMENT     Neuro   Intubated, sedated, and paralyzed.   Will possibly discontinue paralytic if keeping comprehensive, multidisciplinary rounds were conducted with the MICU team. The case was discussed in detail and plans for care were established. Review of Residents documentation was conducted and revisions were made as appropriate. I agree with the above documented exam, problem list and plan of care with the following additions: Tolerating supine trial well - stop prone ventilation  Stop paralytic   Advance tube feeding  Increase insulin  Continue decadron  Free water  Supportive care    37 minutes of CCT spent with the patient, reviewing the chart including imaging studies, and discussing the case with other health care professionals. This time excludes procedures.      Jaja Dyson MD

## 2020-11-20 NOTE — PROGRESS NOTES
[address] [place] [address] [facility]  Progress Note    SUBJECTIVE:  Chief Complaint   Patient presents with    Shortness of Breath     76% room air at arrival     Fever    Emesis     The patient is still in the ICU. Tolerating supination. Still sedated and paralyzed. Review of systems:  As stated above in the chief complaint, otherwise negative. Medications:  Scheduled Meds:   insulin lispro  0-18 Units Subcutaneous Q6H    furosemide  40 mg Intravenous BID    midodrine  10 mg Oral TID WC    dexamethasone  6 mg Oral Daily    pantoprazole  40 mg Intravenous Daily    And    sodium chloride (PF)  10 mL Intravenous Daily    enoxaparin  110 mg Subcutaneous BID    chlorhexidine  15 mL Mouth/Throat BID    influenza virus vaccine  0.5 mL Intramuscular Prior to discharge    sodium chloride flush  10 mL Intravenous 2 times per day    vitamin C  1,000 mg Oral BID    zinc sulfate  50 mg Oral BID    vitamin D3  400 Units Oral Daily     Continuous Infusions:   propofol 20 mcg/kg/min (20 0823)    dextrose      vecuronium (NORCURON) infusion Stopped (20 1605)    fentaNYL 5 mcg/ml in 0.9%  ml infusion 200 mcg/hr (20 0839)    midazolam 10 mg/hr (20 0611)     PRN Meds:glucose, dextrose, glucagon (rDNA), dextrose, sodium chloride, sodium chloride flush, acetaminophen **OR** acetaminophen, polyethylene glycol, promethazine **OR** ondansetron    OBJECTIVE:  BP (!) 91/56   Pulse 99   Temp 96.6 °F (35.9 °C) (Bladder)   Resp 28   Ht 5' 8\" (1.727 m)   Wt 236 lb 12.4 oz (107.4 kg)   SpO2 (!) 88%   BMI 36.00 kg/m²   Temp  Av.6 °F (36.4 °C)  Min: 96.6 °F (35.9 °C)  Max: 98.7 °F (37.1 °C)  Constitutional: The patient is lying in bed. He is intubated, sedated and paralyzed. He is supinated. FiO2 65%. PEEP 12. Skin: Dry. No rashes were noted. HEENT: Round pupils. No jaundice. ET tube. OG tube. Neck: Supple to movements.    Chest: Good breath sounds bilaterally. No crackles were heard. Cardiovascular: Heart sounds rhythmic and regular. Abdomen: Diminished bowel sounds. Soft to palpation. Extremities: No edema. Lines: Right IJ TLC 11/15/2020. Right radial arterial line 11/15/2020. Laboratory and Tests Review:  Lab Results   Component Value Date    WBC 14.1 (H) 11/20/2020    WBC 13.2 (H) 11/19/2020    WBC 14.4 (H) 11/18/2020    HGB 13.7 11/20/2020    HCT 43.3 11/20/2020    MCV 89.1 11/20/2020     11/20/2020     Lab Results   Component Value Date    NEUTROABS 12.55 (H) 11/20/2020    NEUTROABS 11.35 (H) 11/19/2020    NEUTROABS 13.10 (H) 11/18/2020     No results found for: [facility]  Lab Results   Component Value Date    ALT 48 (H) 11/20/2020    AST 39 11/20/2020    ALKPHOS 95 11/20/2020    BILITOT 0.5 11/20/2020     Lab Results   Component Value Date     11/20/2020    K 4.0 11/20/2020    K 3.7 11/15/2020     11/20/2020    CO2 31 11/20/2020    BUN 35 11/20/2020    CREATININE 1.1 11/20/2020    CREATININE 1.0 11/19/2020    CREATININE 1.0 11/18/2020    GFRAA >60 11/20/2020    LABGLOM >60 11/20/2020    GLUCOSE 217 11/20/2020    GLUCOSE 154 02/16/2012    PROT 5.9 11/20/2020    LABALBU 2.6 11/20/2020    CALCIUM 8.7 11/20/2020    BILITOT 0.5 11/20/2020    ALKPHOS 95 11/20/2020    AST 39 11/20/2020    ALT 48 11/20/2020     Lab Results   Component Value Date    CRP 1.4 (H) 11/19/2020    CRP 8.6 (H) 11/17/2020    CRP 12.4 (H) 11/15/2020     Lab Results   Component Value Date    SEDRATE 80 (H) 11/12/2020     Radiology:  Chest x-ray shows bilateral interstitial infiltrates    Microbiology:   Streptococcus pneumoniae/Legionella urine Ag: Negative  Blood cultures 11/11/2020: Negative x2  Respiratory culture 11/17/2020: OP nikita reduced  Nares screen MRSA: Pending    No results for input(s): PROCAL in the last 72 hours. ASSESSMENT:  · Moderate to severe SARS-CoV-2 infection with pneumonia. Completed 5 days of Remdesivir 11/16/2020.   CRP is fluctuating. · Acute respiratory failure  · Lymphopenia secondary to SARS-CoV-2 infection  · Leukocytosis associated to the above and steroids, stable  · Probable community-acquired pneumonia.   Completed 7 days of Levofloxacin 11/18/2020  · Elevation of transaminases associated to the above, fluctuating    PLAN:  · Continue daily dexamethasone  · Anticoagulation  · Supportive treatment    Etienne Ventura  9:22 AM  11/20/2020

## 2020-11-20 NOTE — PROGRESS NOTES
Critical Care Team - Daily Progress Note         Date and time: 11/20/2020 6:22 PM  Patient's name:  Cassie Nieves Record Number: [id number]  Patient's account/billing number: [de-identified]  Patient's YOB: 1955  Age: 77 y.o. Date of Admission: 11/11/2020  2:12 PM  Length of stay during current admission: 9      Primary Care Physician: No primary care provider on file. ICU Attending Physician: Dr. Aileen Isabel    Code Status: Full Code    Reason for ICU admission: Acute hypoxic respiratory failure secondary to COVID-19 pneumonia      SUBJECTIVE:     BRIEF HISTORY: History is gathered from medical records, patient was in respiratory distress on BiPAP, emergently being intubated on arrival in the ICU.     Daxa pearce 65 y.o. arrived to the ED 11/11 with fever, nausea, vomiting.  He reports the symptoms began 6 days prior. Loss of appetite red green-white productive sputum, urinary frequency, diarrhea rhinorrhea, nasal congestion chest pain right upper quadrant abdominal pain.  He had been on a Z-Michael from his primary physician no improvement in symptoms he complains of right sided pleuritic chest pain with cough.  ABG within normal range, leukocytosis, lymphopenia elevated D-dimer greater than 5250 he was started on heparin drip, ferritin 1710 and admitted await ID consult for treatment will need remdesivir, infectious work-up.  He arrived hypoxic was placed on 15 L nonrebreather he denies history of PEs or DVTs      Patient was admitted to the hospital, was on BiPAP, OptiFlow. On 11/15/2020, patient was unable to obtain O2 sat above upper 80s, was in respiratory distress with respiratory rate of 40-50/min. Because of his increasing respiratory distress on BiPAP, patient was transferred to ICU, intubated. He has been followed by pulmonology, infectious disease throughout hospital stay.      OVERNIGHT EVENTS:    11/16/20: Some soft BPs in the evening after being proned, Silas-Synephrine started. 20: Patient supine. Will attempt 6 hours and switch back to prone. 20: Patient prone overnight. Sit to supine this morning. Tolerated supine throughout the day. Put back to prone for 1 more night. 20: Patient switched to supine. Will repeat gas. Off of pressors. 20: Tolerated parenteral day. Became agitated overnight and propofol was started.     CURRENT VENTILATION STATUS:     [x] Ventilator  [] BIPAP  [] Nasal Cannula [] Room Air        SECRETIONS : Amount:  [x] Small [] Moderate  [] Large  [] None  Color:     [] White [] Colored  [] Bloody    SEDATION:    [x] Propofol gtt  [] Versed gtt  [] Ativan gtt   [] No Sedation    PARALYZED:  [] No    [x] Yes      VASOPRESSORS:  [x] No    [] Yes    If yes -   [] Levophed       [] Dopamine     [] Vasopressin       [] Dobutamine  [] Phenylephrine         [] Epinephrine    CENTRAL LINES:     [] No   [] Yes   (Date of Insertion:  11/15 )           If yes -     [x] Right IJ     [] Left IJ [] Right Femoral [] Left Femoral                   [] Right Subclavian [] Left Subclavian       HANSON'S CATHETER:   [] No   [x] Yes  (Date of Insertion:  11/15 )     URINE OUTPUT:            [x] Good   [] Low              [] Anuric      OBJECTIVE:     VITAL SIGNS:  BP (!) 91/56   Pulse 100   Temp 97.3 °F (36.3 °C) (Bladder)   Resp 28   Ht 5' 8\" (1.727 m)   Wt 236 lb 12.4 oz (107.4 kg)   SpO2 100%   BMI 36.00 kg/m²   Tmax over 24 hours:  Temp (24hrs), Av.6 °F (36.4 °C), Min:96.6 °F (35.9 °C), Max:98.7 °F (37.1 °C)      Patient Vitals for the past 6 hrs:   Temp Temp src Pulse Resp SpO2   20 1800 -- -- 100 28 100 %   20 1700 -- -- 104 23 100 %   20 1657 -- -- 107 22 100 %   20 1600 97.3 °F (36.3 °C) Bladder 104 25 95 %   20 1500 -- -- 106 (!) 34 95 %   20 1400 -- -- 95 22 (!) 89 %   20 1300 -- -- 92 25 93 %         Intake/Output Summary (Last 24 hours) at 2020 1822  Last data filed at 2020 1745  Gross per 24 hour   Intake 4181.97 ml   Output 4785 ml   Net -603.03 ml     Wt Readings from Last 2 Encounters:   11/20/20 236 lb 12.4 oz (107.4 kg)     Body mass index is 36 kg/m². PHYSICAL EXAMINATION:  General: Intubated, sedated, supine  Eyes: conjunctivae/corneas clear, sclera non icteric  Ears: no obvious scars, no lesions, no masses  Mouth: mucous membranes moist, no obvious oral sores.   ET tube in place  Head: normocephalic, atraumatic  Neck: no JVD, trachea midline  Lungs: Diminished bilaterally  Heart: tachycardic, regular rhythm, no murmur, normal S1, S2  Abdomen: soft, non-tender; bowel sounds normal; no masses, no organomegaly  Extremities: mild  lower extremity edema, extremities atraumatic, no cyanosis, no clubbing, 2+ pedal pulses palpated  Skin: normal color, normal texture, normal turgor, no rashes, no lesions  Neurologic: unable to assess   MEDICATIONS:    Scheduled Meds:   insulin glargine  10 Units Subcutaneous Nightly    insulin lispro  0-18 Units Subcutaneous Q6H    furosemide  40 mg Intravenous BID    midodrine  10 mg Oral TID WC    dexamethasone  6 mg Oral Daily    pantoprazole  40 mg Intravenous Daily    And    sodium chloride (PF)  10 mL Intravenous Daily    enoxaparin  110 mg Subcutaneous BID    chlorhexidine  15 mL Mouth/Throat BID    influenza virus vaccine  0.5 mL Intramuscular Prior to discharge    sodium chloride flush  10 mL Intravenous 2 times per day    vitamin C  1,000 mg Oral BID    zinc sulfate  50 mg Oral BID    vitamin D3  400 Units Oral Daily     Continuous Infusions:   fentaNYL 5 mcg/ml in 0.9%  ml infusion 200 mcg/hr (11/20/20 1602)    propofol 20 mcg/kg/min (11/20/20 1745)    dextrose      midazolam 5 mg/hr (11/20/20 1652)     PRN Meds:   glucose, 15 g, PRN  dextrose, 12.5 g, PRN  glucagon (rDNA), 1 mg, PRN  dextrose, 100 mL/hr, PRN  sodium chloride, 30 mL, PRN  sodium chloride flush, 10 mL, PRN  acetaminophen, 650 mg, Q6H PRN Or  acetaminophen, 650 mg, Q6H PRN  polyethylene glycol, 17 g, Daily PRN  promethazine, 12.5 mg, Q6H PRN    Or  ondansetron, 4 mg, Q6H PRN        VENT SETTINGS (Comprehensive) (if applicable):  Vent Information  $Ventilation: $Subsequent Day  Skin Assessment: Clean, dry, & intact  Equipment ID: [id number]  Equipment Changed: Airway securing device  Vent Type: [phone]  Vent Mode: AC/VC  Vt Ordered: 500 mL  Rate Set: 26 bmp  Peak Flow: 85 L/min  Pressure Support: 0 cmH20  FiO2 : 65 %  SpO2: 100 %  SpO2/FiO2 ratio: 153.85  Sensitivity: 3  PEEP/CPAP: 12  I Time/ I Time %: 0 s  Humidification Source: Heated wire  Humidification Temp: 37  Humidification Temp Measured: 37  Circuit Condensation: Drained  Mask Type: Full face mask  Mask Size: Medium  Additional Respiratory  Assessments  Pulse: 100  Resp: 28  SpO2: 100 %  Position: Supine  Humidification Source: Heated wire  Humidification Temp: 37  Circuit Condensation: Drained  Oral Care: Mouth suctioned, Mouth swabbed, Mouth moisturizer  Subglottic Suction Done?: Yes  Cuff Pressure (cm H2O): 29 cm H2O    ABGs:   Recent Labs     11/20/20  0527   PH 7.431   PCO2 44.8   PO2 71.9*   HCO3 29.1*   BE 4.2*   O2SAT 93.9       Laboratory findings:    Complete Blood Count:   Recent Labs     11/18/20  0549 11/19/20  0553 11/20/20  0515   WBC 14.4* 13.2* 14.1*   HGB 13.4 13.0 13.7   HCT 41.0 41.2 43.3    371 345        Last 3 Blood Glucose:   Recent Labs     11/18/20  0549 11/19/20  0553 11/20/20  0515   GLUCOSE 195* 181* 217*        PT/INR:    Lab Results   Component Value Date    PROTIME 10.9 02/16/2012    INR 0.9 02/16/2012     PTT:    Lab Results   Component Value Date    APTT 103.3 11/16/2020       Comprehensive Metabolic Profile:   Recent Labs     11/18/20  0549 11/19/20  0553 11/20/20  0515    147* 148*   K 4.2 4.6 4.0   * 112* 110*   CO2 27 28 31*   BUN 23 31* 35*   CREATININE 1.0 1.0 1.1   GLUCOSE 195* 181* 217*   CALCIUM 8.2* 8.4* 8.7   PROT 5.8* 5.7* 5.9* LABALBU 2.6* 2.4* 2.6*   BILITOT 0.5 0.4 0.5   ALKPHOS 100 96 95   AST 43* 39 39   ALT 43* 44* 48*      Magnesium:   Lab Results   Component Value Date    MG 2.7 11/20/2020     Phosphorus:   Lab Results   Component Value Date    PHOS 3.1 11/20/2020     Ionized Calcium: No results found for: CAION       Troponin: No results for input(s): TROPONINI in the last 72 hours. Microbiology:  Cultures during this admission:     Blood cultures:                 [] None drawn      [x] Negative             []  Positive (Details:  )  Urine Culture:                   [x] None drawn      [] Negative             []  Positive (Details:  )  Sputum Culture:               [] None drawn       [] Negative             [x]  Positive (Details: ordered 11/16, pending )   Endotracheal aspirate:     [] None drawn       [] Negative             [x]  Positive (Details: ordered 11/16, pending )     Other pertinent Labs: Strep and legionella ag negative    Radiology/Imaging:     Chest x-ray:  11/20/20  FINDINGS:    Lines and tubes are stable.  Heart size and mediastinal contours are    unchanged.  The lungs are stable.         Impression    No change.                 ASSESSMENT:     Active Problems:    Hypoxia  Resolved Problems:    * No resolved hospital problems. *      Additional assessment:    COVID-19 pneumonia  Acute Respiratory failure secondary to COVID-19 pneumonia  Community acquired bacterial pneumonia  Hypotension -resolved        PLAN:     WEAN PER PROTOCOL:  [] No   [x] Yes  [] N/A    DISCONTINUE ANY LABS:   [x] No   [] Yes    ICU PROPHYLAXIS:  Stress ulcer:  [x] PPI Agent  [] [id number] [] Sucralfate  [] Other:  VTE:  [x] Enoxaparin  [] Unfract.  Heparin Subcut  [] EPC Cuffs    NUTRITION:  [] NPO [] Tube Feeding (Specify: ) [] TPN  [x] PO (Diet: DIET TUBE FEED CONTINUOUS/CYCLIC NPO; Semi-elemental; Orogastric; Continuous; 20; 65  Diet Tube Feed Modular: Protein Modular)    HOME MEDICATIONS RECONCILED: [] No  [] Yes    INSULIN DRIP:   [x] No   [] Yes    CONSULTATION NEEDED:  [x] No   [] Yes    FAMILY UPDATED:    [] No   [] Yes    TRANSFER OUT OF ICU:   [x] No   [] Yes    ADDITIONAL PLAN:  SYSTEMS ASSESSMENT     Neuro   Intubated, sedated   -Propofol added overnight. Stop Versed today. Continue fentanyl. Off paralytics     Respiratory   Acute hypoxic respiratory failure secondary to COVID-19 pneumonia  -Was initially on BiPAP, was intubated 11/15  -ID following  -levofloxacin for presumed community-acquired pneumonia superimposed on COVID-19 PNA  -Patient tolerating supine. Wean FiO2 and PEEP as able. Keep plateau pressures less than 30. Superimposed PNA-completed 7 days of Levaquin on 11/18     Cardiovascular   Hypotension-resolved   -On midodrine. Monitor pressures.     Gastrointestinal   Tube feeds     Renal   Hypernatremia-increase free water     Infectious Disease   COVID-19 pneumonia  On Decadron, decreased to daily. , day 10, remdesivir-finished  Presumed community-acquired pneumonia, followed by ID, finish 7 days of Levaquin     Hematology/Oncology   Hypercoagulability secondary to Covid pneumonia   -Dimer increased, continue therapeutic dose Lovenox     Endocrine   Hyperglycemia related to steroids -high-dose sliding scale insulin, Lantus 10 units daily     Social/Spiritual/DNR/Other   Full code      Diet: DIET TUBE FEED CONTINUOUS/CYCLIC NPO; Semi-elemental; Orogastric; Continuous; 20; 65  Diet Tube Feed Modular: Protein Modular  CODE Status: Full Code    Dispo: Maintain ICU level of care    1. As above  2. Tolerating supine. Wean FiO2 and PEEP as able  3. Add Lantus  4. Increase free water  5. GI prophylaxis - protonix  6. DVT Prophylaxis - lovenox BID  7. Discuss case and plan with attending, Dr. Roland Latif, DO  Resident, PGY-2  11/20/2020  6:22 PM     I personally saw, examined and provided care for the patient. Radiographs, labs and medication list were reviewed by me independently.  I spoke with bedside nursing, therapists and consultants. Critical care services and times documented are independent of procedures and multidisciplinary rounds with Residents. Additionally comprehensive, multidisciplinary rounds were conducted with the MICU team. The case was discussed in detail and plans for care were established. Review of Residents documentation was conducted and revisions were made as appropriate. I agree with the above documented exam, problem list and plan of care with the following additions:    Stopped proning, off paralytic  Wean FiO2 as able and then start weaning PEEP  Sedation needed for vent synchrony  Continue decadron  Add long acting insulin  Increase free water    36 minutes of CCT spent with the patient, reviewing the chart including imaging studies, and discussing the case with other health care professionals. This time excludes procedures.      Nish Hines MD

## 2020-11-20 NOTE — PLAN OF CARE
Problem:  Body Temperature -  Risk of, Imbalanced  Goal: Ability to maintain a body temperature within defined limits  11/20/2020 0916 by Lucero Brennan RN  Outcome: Met This Shift  11/20/2020 0328 by Sarah Cuello RN  Outcome: Met This Shift     Problem: Nutrition Deficits  Goal: Optimize nutrtional status  11/20/2020 0916 by Lucero Brennan RN  Outcome: Met This Shift  11/20/2020 0328 by Sarah Cuello RN  Outcome: Met This Shift     Problem: Risk for Fluid Volume Deficit  Goal: Maintain normal heart rhythm  11/20/2020 0916 by Lucero Brennan RN  Outcome: Met This Shift  11/20/2020 0328 by Sarah Cuello RN  Outcome: Met This Shift  Goal: Maintain absence of muscle cramping  11/20/2020 0916 by Lucero Brennan RN  Outcome: Met This Shift  11/20/2020 0328 by Sarah Cuello RN  Outcome: Met This Shift  Goal: Maintain normal serum potassium, sodium, calcium, phosphorus, and pH  11/20/2020 0916 by Lucero Brennan RN  Outcome: Met This Shift  11/20/2020 0328 by Sarah Cuello RN  Outcome: Met This Shift     Problem: Patient Education: Go to Patient Education Activity  Goal: Patient/Family Education  11/20/2020 0328 by Sarah Cuello RN  Outcome: Met This Shift     Problem: Falls - Risk of:  Goal: Will remain free from falls  Description: Will remain free from falls  11/20/2020 0916 by Lucero Brennan RN  Outcome: Met This Shift  11/20/2020 0328 by Sarah Cuello RN  Outcome: Met This Shift  Goal: Absence of physical injury  Description: Absence of physical injury  11/20/2020 0916 by Lucero Brennan RN  Outcome: Met This Shift  11/20/2020 0328 by Sarah Cuello RN  Outcome: Met This Shift     Problem: Pain:  Goal: Pain level will decrease  Description: Pain level will decrease  11/20/2020 0916 by Lucero Brennan RN  Outcome: Met This Shift  11/20/2020 0328 by Sarah Cuello RN  Outcome: Met This Shift  Goal: Control of acute pain  Description: Control of acute pain  11/20/2020 0916 by Lucero Brennan RN  Outcome: Met This Shift  11/20/2020 0328 by Geremias Mcclure RN  Outcome: Met This Shift  Goal: Control of chronic pain  Description: Control of chronic pain  11/20/2020 0916 by Moreno Hsu RN  Outcome: Met This Shift  11/20/2020 0328 by Geremias Mcclure RN  Outcome: Met This Shift     Problem: Skin Integrity:  Goal: Will show no infection signs and symptoms  Description: Will show no infection signs and symptoms  11/20/2020 0916 by Moreno Hsu RN  Outcome: Met This Shift  11/20/2020 0328 by Geremias Mcclure RN  Outcome: Met This Shift  Goal: Absence of new skin breakdown  Description: Absence of new skin breakdown  11/20/2020 0916 by Moreno Hsu RN  Outcome: Met This Shift  11/20/2020 0328 by Geremias Mcclure RN  Outcome: Met This Shift     Problem: Restraint Use - Nonviolent/Non-Self-Destructive Behavior:  Goal: Absence of restraint-related injury  Description: Absence of restraint-related injury  11/20/2020 0916 by Moreno Hsu RN  Outcome: Met This Shift  11/20/2020 0328 by Geremias Mcclure RN  Outcome: Met This Shift     Problem: Airway Clearance - Ineffective  Goal: Achieve or maintain patent airway  11/20/2020 0916 by Moreno Hsu RN  Outcome: Ongoing  11/20/2020 0328 by Geremias Mcclure RN  Outcome: Met This Shift     Problem: Gas Exchange - Impaired  Goal: Absence of hypoxia  11/20/2020 0916 by Moreno Hsu RN  Outcome: Ongoing  11/20/2020 0328 by Gereimas Mcclure RN  Outcome: Met This Shift  Goal: Promote optimal lung function  Outcome: Ongoing     Problem: Breathing Pattern - Ineffective  Goal: Ability to achieve and maintain a regular respiratory rate  11/20/2020 0916 by Moreno Hsu RN  Outcome: Ongoing  11/20/2020 0328 by Geremias Mcclure RN  Outcome: Met This Shift     Problem:  Body Temperature -  Risk of, Imbalanced  Goal: Complications related to the disease process, condition or treatment will be avoided or minimized  Outcome: Ongoing     Problem: Isolation Precautions - Risk of Spread of Infection  Goal: Prevent transmission of infection  11/20/2020 0916 by Owen Villarreal RN  Outcome: Ongoing  11/20/2020 0328 by Charlotte Moise RN  Outcome: Met This Shift     Problem:  Body Temperature -  Risk of, Imbalanced  Goal: Will regain or maintain usual level of consciousness  11/20/2020 0916 by Owen Villarreal RN  Outcome: Not Met This Shift  11/20/2020 0328 by Charlotte Moise RN  Outcome: Not Met This Shift     Problem: Loneliness or Risk for Loneliness  Goal: Demonstrate positive use of time alone when socialization is not possible  11/20/2020 0328 by Charlotte Moise RN  Outcome: Not Met This Shift     Problem: Fatigue  Goal: Verbalize increase energy and improved vitality  11/20/2020 0328 by Charlotte Moise RN  Outcome: Not Met This Shift     Problem: Restraint Use - Nonviolent/Non-Self-Destructive Behavior:  Goal: Absence of restraint indications  Description: Absence of restraint indications  11/20/2020 0916 by Owen Villarreal RN  Outcome: Not Met This Shift  11/20/2020 0328 by Charlotte Moise RN  Outcome: Not Met This Shift

## 2020-11-20 NOTE — PATIENT CARE CONFERENCE
Intensive Care Daily Quality Rounding Checklist        ICU Team Members: Dr. Anusha Garcia, Anish Latif & Melinda (residents), clinical pharmacist, charge nurse, bedside nurse, respiratory therapist     ICU Day #: NUMBER: 6     Intubation Date: November 15     Ventilator Day #: NUMBER: 6     Central Line Insertion Date: November 15                                                    Day #: NUMBER: 6      Arterial Line Insertion Date: November 15                             Day #: NUMBER: 6     DVT Prophylaxis: Lovenox     GI Prophylaxis: Protonix      Martinez Catheter Insertion Date: November 15                                        Day #: 6                             Continued need (if yes, reason documented and discussed with physician): yes, strict I/O, critically ill pt      Skin Issues/ Wounds and ordered treatment discussed on rounds: SOS precautions      Goals/ Plans for the Day: Daily labs, wean vent as able, soft wrist restraints to prevent pulling tubes

## 2020-11-20 NOTE — PROGRESS NOTES
11/20/2020  11:50 AM      Comprehensive Nutrition Assessment    Type and Reason for Visit:  Reassess    Nutrition Recommendations/Plan: Continue current TF and recommend Consult Dietitian for new TF rec/order, if propofol is weaned. With current dose of propofol and TF, pt is receiving 100% calorie/protein needs (2307 thao, 143 g pro)    Nutrition Assessment:  Pt remains intubated/sedated/paralyzed but tolerating supine. Recommend continue current TF until vent weaned and PO possible    Malnutrition Assessment:  Malnutrition Status:  Insufficient data    Context:  Acute Illness     Findings of the 6 clinical characteristics of malnutrition:  Energy Intake:  Mild decrease in energy intake (Comment)  Weight Loss:  Unable to assess     Body Fat Loss:  Unable to assess     Muscle Mass Loss:  Unable to assess    Fluid Accumulation:  No significant fluid accumulation     Strength:  Not Performed    Estimated Daily Nutrient Needs:  Energy (kcal):  ; Weight Used for Energy Requirements:  Admission     Protein (g):  125-140 (1.8-2 g/kg);  Weight Used for Protein Requirements:  Ideal        Fluid (ml/day):  Per Critical Care; Method Used for Fluid Requirements:         Nutrition Related Findings:  intubated/sedated/paralyzed, supine, OGT to TF,      Wounds:  None       Current Nutrition Therapies:    Current Tube Feeding (TF) Orders:  · Feeding Route: Orogastric  · Formula: Semi-Elemental  · Schedule: Continuous  · Additives/Modulars: Protein(once daily)  · Water Flushes: 300 ml Q4 hr  · Current TF & Flush Orders Provides: at goal  · Goal TF & Flush Orders Provides: 1560 ml/d, 1972 thao, 143 g pro, 3065 ml total free water    Additional Calorie Sources:   propofol = 335 thao    Anthropometric Measures:  · Height: 5' 8\" (172.7 cm)  · Current Body Weight: 236 lb (107 kg)(11/20)   · Admission Body Weight: 234 lb (106.1 kg)(11/18 first measured wt)    · Usual Body Weight: (UTO)     · Ideal Body Weight: 154 lbs; % Ideal Body Weight 151.3 %   · BMI: 35.9  · BMI Categories: Obese Class 2 (BMI 35.0 -39.9)(35.6 at admit wt)       Nutrition Diagnosis:   · Inadequate oral intake related to impaired respiratory function(2/2 COVID-19 PNA) as evidenced by NPO or clear liquid status due to medical condition, intubation      Nutrition Interventions:   Food and/or Nutrient Delivery:  Continue Current Tube Feeding  Nutrition Education/Counseling:  Education not indicated   Coordination of Nutrition Care:  Continue to monitor while inpatient    Goals:  pt jb EN at goal rate       Nutrition Monitoring and Evaluation:   Food/Nutrient Intake Outcomes:  Enteral Nutrition Intake/Tolerance  Physical Signs/Symptoms Outcomes:  Biochemical Data, GI Status, Fluid Status or Edema, Hemodynamic Status, Weight, Skin, Nutrition Focused Physical Findings     Discharge Planning:     Too soon to determine     Electronically signed by Kyle Mobley RD, CNSC, LD on 11/20/20 at 11:50 AM EST    Contact: [phone]

## 2020-11-20 NOTE — CARE COORDINATION
Updated plan of care. Cont vent support, fio2-65, PEEP-12. Select following. Pt NOT ready d/c. Attempted to call spouse to continue further discussion of discharge planning.  Will need to follow-MJO

## 2020-11-20 NOTE — PLAN OF CARE
Nonviolent/Non-Self-Destructive Behavior:  Goal: Absence of restraint-related injury  Description: Absence of restraint-related injury  Outcome: Met This Shift     Problem:  Body Temperature -  Risk of, Imbalanced  Goal: Will regain or maintain usual level of consciousness  Outcome: Not Met This Shift     Problem: Loneliness or Risk for Loneliness  Goal: Demonstrate positive use of time alone when socialization is not possible  Outcome: Not Met This Shift     Problem: Fatigue  Goal: Verbalize increase energy and improved vitality  Outcome: Not Met This Shift     Problem: Restraint Use - Nonviolent/Non-Self-Destructive Behavior:  Goal: Absence of restraint indications  Description: Absence of restraint indications  Outcome: Not Met This Shift

## 2020-11-20 NOTE — PROGRESS NOTES
[facility] Hospitalist   Progress Note    Admitting Date and Time: 11/11/2020  2:12 PM  Admit Dx: Hypoxia [R09.02]  Hypoxia [R09.02]    Subjective: Admitted on 11th, presented with shortness of breath, hypoxic, prior tobacco abuse, initial impression of Covid pneumonia, also THOMAS. Patient was started on dexamethasone. Did have evidence of elevated transaminases. Seen by pulmonary, severe COVID-19 with possible bacterial pneumonia. Patient with multifocal groundglass opacities. D-dimer more than 5250, patient was on heparin drip now on Lovenox 110 mg twice a day. Also received remdesivir  for full  5 days as per ID recommendations. On NIV as of 14th. Patient also remained on vitamin C as well as zinc.  Required intubation on 15th. Which improving hypotension patient is off pressors as of 18th. Patient was admitted with Hypoxia [R09.02]  Hypoxia [R09.02]. Patient resting, comfortable, paralyzed, in ICU, on vent, under airborne isolation. Per RN: Patient is off paralytics, has been started on tube feeding, tolerating well. ROS: denies fever, chills, cp, sob, n/v, HA unless stated above.      insulin lispro  0-18 Units Subcutaneous Q6H    furosemide  40 mg Intravenous BID    midodrine  10 mg Oral TID WC    dexamethasone  6 mg Oral Daily    pantoprazole  40 mg Intravenous Daily    And    sodium chloride (PF)  10 mL Intravenous Daily    enoxaparin  110 mg Subcutaneous BID    chlorhexidine  15 mL Mouth/Throat BID    influenza virus vaccine  0.5 mL Intramuscular Prior to discharge    sodium chloride flush  10 mL Intravenous 2 times per day    vitamin C  1,000 mg Oral BID    zinc sulfate  50 mg Oral BID    vitamin D3  400 Units Oral Daily     glucose, 15 g, PRN  dextrose, 12.5 g, PRN  glucagon (rDNA), 1 mg, PRN  dextrose, 100 mL/hr, PRN  sodium chloride, 30 mL, PRN  sodium chloride flush, 10 mL, PRN  acetaminophen, 650 mg, Q6H PRN    Or  acetaminophen, 650 mg, Q6H PRN  polyethylene glycol, 17 g, Daily PRN  promethazine, 12.5 mg, Q6H PRN    Or  ondansetron, 4 mg, Q6H PRN         Objective:    BP (!) 91/56   Pulse 96   Temp 96.6 °F (35.9 °C) (Bladder)   Resp 25   Ht 5' 8\" (1.727 m)   Wt 236 lb 12.4 oz (107.4 kg)   SpO2 95%   BMI 36.00 kg/m²     Due to the patient's COVID-19-positive status, to reduce exposure, contamination, and in an effort to conserve PPE, this patient was evaluated through a combination of review of the medical record, nursing assessments, other physicians' exams and assessments, as well as telemedicine interaction. Limited examination to include heart sounds S1 and S2.  Lungs show scattered crackles. Abdomen is soft, no masses felt. Trace edema. Recent Labs     11/18/20  0549 11/19/20  0553 11/20/20  0515    147* 148*   K 4.2 4.6 4.0   * 112* 110*   CO2 27 28 31*   BUN 23 31* 35*   CREATININE 1.0 1.0 1.1   GLUCOSE 195* 181* 217*   CALCIUM 8.2* 8.4* 8.7       Recent Labs     11/18/20  0549 11/19/20  0553 11/20/20  0515   WBC 14.4* 13.2* 14.1*   RBC 4.72 4.62 4.86   HGB 13.4 13.0 13.7   HCT 41.0 41.2 43.3   MCV 86.9 89.2 89.1   MCH 28.4 28.1 28.2   MCHC 32.7 31.6* 31.6*   RDW 15.5* 15.8* 15.8*    371 345   MPV 10.4 10.6 10.5     CRP 1.4  Sodium 147 /148 today   chloride 112 /110  CO2 31  BUN 35  WBC 13.2 /14.1  Hemoglobin 13  Blood glucose in the range of 150-223  Last       Radiology:   XR CHEST PORTABLE   Final Result   No change. XR CHEST 1 VIEW   Final Result   1. No interval change in the multifocal bilateral pulmonary infiltrates. XR CHEST PORTABLE   Final Result   Persistent bilateral ground-glass opacity infiltrates throughout both lungs   with fine underline reticular pattern. The infiltrates appears to be   somewhat increased since the previous examination of November 15. No   conspicuous pleural effusions observed. The heart is normal size.       RECOMMENDATION:   Further increase in bilateral

## 2020-11-21 NOTE — PROGRESS NOTES
[facility] Hospitalist   Progress Note    Admitting Date and Time: 11/11/2020  2:12 PM  Admit Dx: Hypoxia [R09.02]  Hypoxia [R09.02]    Subjective: Admitted on 11th, presented with shortness of breath, hypoxic, prior tobacco abuse, initial impression of Covid pneumonia, also THOMAS. Patient was started on dexamethasone. Did have evidence of elevated transaminases. Seen by pulmonary, severe COVID-19 with possible bacterial pneumonia. Patient with multifocal groundglass opacities. D-dimer more than 5250, patient was on heparin drip now on Lovenox 110 mg twice a day. Also received remdesivir  for full  5 days as per ID recommendations. On NIV as of 14th. Patient also remained on vitamin C as well as zinc.  Required intubation on 15th. Which improving hypotension patient is off pressors as of 18th. ID has continued dexamethasone and recommended changing lines. Patient was admitted with Hypoxia [R09.02]  Hypoxia [R09.02]. Patient resting, comfortable, off paralytics, sedated with fentanyl and Versed, in ICU, on vent, under airborne isolation. Per RN: Issues with hypoxemia, has been started on tube feeding, tolerating well. Did have difficulty with agitation, now on Seroquel. ROS: denies fever, chills, cp, sob, n/v, HA unless stated above.      insulin glargine  10 Units Subcutaneous Nightly    insulin lispro  0-18 Units Subcutaneous Q6H    furosemide  40 mg Intravenous BID    midodrine  10 mg Oral TID WC    dexamethasone  6 mg Oral Daily    pantoprazole  40 mg Intravenous Daily    And    sodium chloride (PF)  10 mL Intravenous Daily    enoxaparin  110 mg Subcutaneous BID    chlorhexidine  15 mL Mouth/Throat BID    influenza virus vaccine  0.5 mL Intramuscular Prior to discharge    sodium chloride flush  10 mL Intravenous 2 times per day    vitamin C  1,000 mg Oral BID    zinc sulfate  50 mg Oral BID    vitamin D3  400 Units Oral Daily     glucose, 15 g, PRN  dextrose, 12.5 g, PRN  glucagon (rDNA), 1 mg, PRN  dextrose, 100 mL/hr, PRN  sodium chloride, 30 mL, PRN  sodium chloride flush, 10 mL, PRN  acetaminophen, 650 mg, Q6H PRN    Or  acetaminophen, 650 mg, Q6H PRN  polyethylene glycol, 17 g, Daily PRN  promethazine, 12.5 mg, Q6H PRN    Or  ondansetron, 4 mg, Q6H PRN         Objective:    /62   Pulse 89   Temp 97.7 °F (36.5 °C) (Bladder)   Resp 26   Ht 5' 8\" (1.727 m)   Wt 236 lb 12.4 oz (107.4 kg)   SpO2 96%   BMI 36.00 kg/m²     Due to the patient's COVID-19-positive status, to reduce exposure, contamination, and in an effort to conserve PPE, this patient was evaluated through a combination of review of the medical record, nursing assessments, other physicians' exams and assessments, as well as telemedicine interaction. Limited examination to include heart sounds S1 and S2.  Lungs show scattered crackles. Abdomen is soft, no masses felt. Trace edema. Recent Labs     11/19/20  0553 11/20/20  0515 11/21/20  0550   * 148* 146   K 4.6 4.0 4.0   * 110* 105   CO2 28 31* 32*   BUN 31* 35* 33*   CREATININE 1.0 1.1 1.0   GLUCOSE 181* 217* 199*   CALCIUM 8.4* 8.7 8.8       Recent Labs     11/19/20  0553 11/20/20  0515 11/21/20  0550   WBC 13.2* 14.1* 23.1*   RBC 4.62 4.86 5.02   HGB 13.0 13.7 14.1   HCT 41.2 43.3 45.2   MCV 89.2 89.1 90.0   MCH 28.1 28.2 28.1   MCHC 31.6* 31.6* 31.2*   RDW 15.8* 15.8* 16.0*    345 386   MPV 10.6 10.5 10.9     CRP 1.4  Sodium 147 /148 /146 today   chloride 112 /110  CO2 31  BUN 35  WBC 13.2 /14.1/23.1 today  Hemoglobin 13  Blood glucose in the range of 150-223  Last       Radiology:   XR CHEST PORTABLE   Final Result   No significant change since prior study      XR CHEST PORTABLE   Final Result   No change. XR CHEST 1 VIEW   Final Result   1. No interval change in the multifocal bilateral pulmonary infiltrates.       XR CHEST PORTABLE   Final Result   Persistent bilateral ground-glass opacity infiltrates On GI prophylaxis. 8.            Hypotensive, on midodrine as well as pressors. 9.            Hyperglycemia, likely steroid induced, does remain on 10 of Lantus as well as sliding scale. 10.          We will continue current care as per the critical care as well as the specialties involved.         Electronically signed by Eric Rodriguez MD on 11/21/2020 at 9:27 AM

## 2020-11-21 NOTE — PROCEDURES
Attempt to left basilic made but unable to get SVC despite numerous patient repositioning and flushing tecniques. Procedure aborted, cephalic vein not large enough. No problem on right side. PICC    Catheter insertion date 11/21/2020     Product Number:  Ref [facility] [id number]   Lot No: [id number]   Gauge: 18 x 2   Lumen: OHMD,5KS   R Basilic    Vein Diameter : 0.54cm   Upper arm circumference (10CM ABOVE AC): 34cm   Catheter Length : 50cm   Internal Length: 46cm   External Catheter Length: 4cm   Ultrasound Used:yes  [facility] [place] confirms PICC tip is placed in the lower 1/3 of the SVC or at the Cavoatrial junction. Floor nurse notified PICC is okay to use.    : ALEJANDRA Linda RN  Assistant: MIRELLA De Los Santos-BC

## 2020-11-21 NOTE — PLAN OF CARE
Problem: Airway Clearance - Ineffective  Goal: Achieve or maintain patent airway  11/21/2020 0909 by Ibeth Davis RN  Outcome: Met This Shift  11/21/2020 0440 by Dot Billingsley RN  Outcome: Met This Shift     Problem: Body Temperature -  Risk of, Imbalanced  Goal: Ability to maintain a body temperature within defined limits  11/21/2020 0909 by Ibeth Davis RN  Outcome: Met This Shift  11/21/2020 0440 by Dot Billingsley RN  Outcome: Met This Shift     Problem: Nutrition Deficits  Goal: Optimize nutrtional status  11/21/2020 0909 by Ibeth Davis RN  Outcome: Met This Shift  11/21/2020 0440 by Dot Billingsley RN  Outcome: Met This Shift     Problem: Risk for Fluid Volume Deficit  Goal: Maintain absence of muscle cramping  11/21/2020 0440 by Dot Billingsley RN  Outcome: Met This Shift  Goal: Maintain normal serum potassium, sodium, calcium, phosphorus, and pH  11/21/2020 0440 by Dot Billingsley RN  Outcome: Met This Shift     Problem: Patient Education: Go to Patient Education Activity  Goal: Patient/Family Education  11/21/2020 0440 by Dot Billingsley RN  Outcome: Met This Shift     Problem: Falls - Risk of:  Goal: Will remain free from falls  Description: Will remain free from falls  11/21/2020 0909 by Ibeth Davis RN  Outcome: Met This Shift  11/21/2020 0440 by Dot Billingsley RN  Outcome: Met This Shift  Goal: Absence of physical injury  Description: Absence of physical injury  11/21/2020 0909 by Ibeth Davis RN  Outcome: Met This Shift  11/21/2020 0440 by Dot Billingsley RN  Outcome: Met This Shift     Problem: Pain:  Goal: Pain level will decrease  Description: Pain level will decrease  11/21/2020 0909 by Ibeth Davis RN  Outcome: Met This Shift  11/21/2020 0440 by Dot Billingsley RN  Outcome: Met This Shift  Goal: Control of acute pain  Description: Control of acute pain  11/21/2020 0909 by Ibeth Davis RN  Outcome: Met This Shift  11/21/2020 0440 by Dot Billingsley RN  Outcome:  Met This Shift  Goal: Control of chronic pain  Description: Control of chronic pain  11/21/2020 0909 by Goran Landa RN  Outcome: Met This Shift  11/21/2020 0440 by Glenda Dow RN  Outcome: Met This Shift     Problem: Skin Integrity:  Goal: Will show no infection signs and symptoms  Description: Will show no infection signs and symptoms  11/21/2020 0909 by Goran Landa RN  Outcome: Met This Shift  11/21/2020 0440 by Glenda Dow RN  Outcome: Met This Shift  Goal: Absence of new skin breakdown  Description: Absence of new skin breakdown  11/21/2020 0909 by Goran Landa RN  Outcome: Met This Shift  11/21/2020 0440 by Glenda Dow RN  Outcome: Met This Shift     Problem: Restraint Use - Nonviolent/Non-Self-Destructive Behavior:  Goal: Absence of restraint-related injury  Description: Absence of restraint-related injury  11/21/2020 0909 by Goran Landa RN  Outcome: Met This Shift  11/21/2020 0440 by Glenda Dow RN  Outcome: Met This Shift     Problem: Gas Exchange - Impaired  Goal: Absence of hypoxia  Outcome: Ongoing  Goal: Promote optimal lung function  Outcome: Ongoing     Problem: Breathing Pattern - Ineffective  Goal: Ability to achieve and maintain a regular respiratory rate  11/21/2020 0909 by Goran Landa RN  Outcome: Ongoing  11/21/2020 0440 by Glenda Dow RN  Outcome: Met This Shift     Problem:  Body Temperature -  Risk of, Imbalanced  Goal: Complications related to the disease process, condition or treatment will be avoided or minimized  Outcome: Ongoing     Problem: Isolation Precautions - Risk of Spread of Infection  Goal: Prevent transmission of infection  11/21/2020 0909 by Goran Landa RN  Outcome: Ongoing  11/21/2020 0440 by Glenda Dow RN  Outcome: Met This Shift     Problem: Risk for Fluid Volume Deficit  Goal: Maintain normal heart rhythm  11/21/2020 0909 by Goran Landa RN  Outcome: Ongoing  11/21/2020 0440 by Glenda Dow RN  Outcome: Met This

## 2020-11-21 NOTE — PROGRESS NOTES
[facility] Hospitalist   Progress Note    Admitting Date and Time: 11/11/2020  2:12 PM  Admit Dx: Hypoxia [R09.02]  Hypoxia [R09.02]    Subjective: Admitted on 11th, presented with shortness of breath, hypoxic, prior tobacco abuse, initial impression of Covid pneumonia, also THOMAS. Patient was started on dexamethasone. Did have evidence of elevated transaminases. Seen by pulmonary, severe COVID-19 with possible bacterial pneumonia. Patient with multifocal groundglass opacities. D-dimer more than 5250, patient was on heparin drip now on Lovenox 110 mg twice a day. Also received remdesivir  for full  5 days as per ID recommendations. On NIV as of 14th. Patient also remained on vitamin C as well as zinc.  Required intubation on 15th. Which improving hypotension patient is off pressors as of 18th. ID has continued dexamethasone and recommended changing lines. Patient was admitted with Hypoxia [R09.02]  Hypoxia [R09.02]. Patient resting, comfortable, paralyzed, in ICU, on vent, under airborne isolation. Per RN: Patient is off paralytics, has been started on tube feeding, tolerating well. ROS: denies fever, chills, cp, sob, n/v, HA unless stated above.      insulin glargine  10 Units Subcutaneous Nightly    insulin lispro  0-18 Units Subcutaneous Q6H    furosemide  40 mg Intravenous BID    midodrine  10 mg Oral TID WC    dexamethasone  6 mg Oral Daily    pantoprazole  40 mg Intravenous Daily    And    sodium chloride (PF)  10 mL Intravenous Daily    enoxaparin  110 mg Subcutaneous BID    chlorhexidine  15 mL Mouth/Throat BID    influenza virus vaccine  0.5 mL Intramuscular Prior to discharge    sodium chloride flush  10 mL Intravenous 2 times per day    vitamin C  1,000 mg Oral BID    zinc sulfate  50 mg Oral BID    vitamin D3  400 Units Oral Daily     glucose, 15 g, PRN  dextrose, 12.5 g, PRN  glucagon (rDNA), 1 mg, PRN  dextrose, 100 mL/hr, PRN  sodium chloride, 30 mL, PRN  sodium chloride flush, 10 mL, PRN  acetaminophen, 650 mg, Q6H PRN    Or  acetaminophen, 650 mg, Q6H PRN  polyethylene glycol, 17 g, Daily PRN  promethazine, 12.5 mg, Q6H PRN    Or  ondansetron, 4 mg, Q6H PRN         Objective:    /62   Pulse 89   Temp 97.7 °F (36.5 °C) (Bladder)   Resp 26   Ht 5' 8\" (1.727 m)   Wt 236 lb 12.4 oz (107.4 kg)   SpO2 96%   BMI 36.00 kg/m²     Due to the patient's COVID-19-positive status, to reduce exposure, contamination, and in an effort to conserve PPE, this patient was evaluated through a combination of review of the medical record, nursing assessments, other physicians' exams and assessments, as well as telemedicine interaction. Limited examination to include heart sounds S1 and S2.  Lungs show scattered crackles. Abdomen is soft, no masses felt. Trace edema. Recent Labs     11/19/20  0553 11/20/20  0515 11/21/20  0550   * 148* 146   K 4.6 4.0 4.0   * 110* 105   CO2 28 31* 32*   BUN 31* 35* 33*   CREATININE 1.0 1.1 1.0   GLUCOSE 181* 217* 199*   CALCIUM 8.4* 8.7 8.8       Recent Labs     11/19/20  0553 11/20/20  0515 11/21/20  0550   WBC 13.2* 14.1* 23.1*   RBC 4.62 4.86 5.02   HGB 13.0 13.7 14.1   HCT 41.2 43.3 45.2   MCV 89.2 89.1 90.0   MCH 28.1 28.2 28.1   MCHC 31.6* 31.6* 31.2*   RDW 15.8* 15.8* 16.0*    345 386   MPV 10.6 10.5 10.9     CRP 1.4  Sodium 147 /148 today   chloride 112 /110  CO2 31  BUN 35  WBC 13.2 /14.1  Hemoglobin 13  Blood glucose in the range of 150-223  Last       Radiology:   XR CHEST PORTABLE   Final Result   No significant change since prior study      XR CHEST PORTABLE   Final Result   No change. XR CHEST 1 VIEW   Final Result   1. No interval change in the multifocal bilateral pulmonary infiltrates. XR CHEST PORTABLE   Final Result   Persistent bilateral ground-glass opacity infiltrates throughout both lungs   with fine underline reticular pattern.   The infiltrates appears to be   somewhat increased since the previous examination of November 15. No   conspicuous pleural effusions observed. The heart is normal size. RECOMMENDATION:   Further increase in bilateral infiltrates since November 15. XR CHEST PORTABLE   Final Result   Support devices in good position. Persistent interstitial and airspace   opacities in both lungs. XR ABDOMEN FOR NG/OG/NE TUBE PLACEMENT   Final Result   Support devices in good position. Persistent interstitial and airspace   opacities in both lungs. CT CHEST WO CONTRAST   Final Result   Multifocal confluent and ground-glass airspace disease in the lungs   bilaterally most consistent with multifocal pneumonia. Emphysematous changes in the lungs with peripheral bulla. Nonspecific mediastinal nodes, which may be reactive. Limited evaluation without intravenous contrast.         CT ABDOMEN PELVIS WO CONTRAST Additional Contrast? None   Final Result   No evidence of acute intra-abdominal pathology. XR CHEST PORTABLE   Final Result   1. Significant bilateral multifocal pulmonary infiltrates             Assessment:    Active Problems:    Hypoxia  Resolved Problems:    * No resolved hospital problems. *      Plan:  1. Acute hypoxic respiratory failure, requiring support with ventilator, patient remains in ICU, under care from critical care. 2.          More due to COVID-19 pneumonia, patient has been seen by ID, completed therapy with remdesivir, patient remains on Decadron, improvement in CRP. 3.          Patient with significantly elevated D-dimer, was on IV heparin, now remains on Lovenox 110 mg twice a day. 4.          Suspected bacterial pneumonia, patient does remain on Levaquin. 5.          THOMAS, creatinine has improved back to normal level. 6.           Borderline BP, remains on midodrine. 7.           On GI prophylaxis.         Electronically signed by Jean Paul Coates MD on 11/21/2020 at 9:24 AM

## 2020-11-21 NOTE — PROGRESS NOTES
Chest: Good breath sounds bilaterally. No crackles were heard. Cardiovascular: Heart sounds rhythmic and regular. Abdomen: Diminished bowel sounds. Soft to palpation. Extremities: No edema. Lines: Right IJ TLC 11/15/2020. Right radial arterial line 11/15/2020. Laboratory and Tests Review:  Lab Results   Component Value Date    WBC 23.1 (H) 11/21/2020    WBC 14.1 (H) 11/20/2020    WBC 13.2 (H) 11/19/2020    HGB 14.1 11/21/2020    HCT 45.2 11/21/2020    MCV 90.0 11/21/2020     11/21/2020     Lab Results   Component Value Date    NEUTROABS 12.55 (H) 11/20/2020    NEUTROABS 11.35 (H) 11/19/2020    NEUTROABS 13.10 (H) 11/18/2020     No results found for: [facility]  Lab Results   Component Value Date    ALT 53 (H) 11/21/2020    AST 43 (H) 11/21/2020    ALKPHOS 91 11/21/2020    BILITOT 0.7 11/21/2020     Lab Results   Component Value Date     11/21/2020    K 4.0 11/21/2020    K 3.7 11/15/2020     11/21/2020    CO2 32 11/21/2020    BUN 33 11/21/2020    CREATININE 1.0 11/21/2020    CREATININE 1.1 11/20/2020    CREATININE 1.0 11/19/2020    GFRAA >60 11/21/2020    LABGLOM >60 11/21/2020    GLUCOSE 199 11/21/2020    GLUCOSE 154 02/16/2012    PROT 6.2 11/21/2020    LABALBU 3.0 11/21/2020    CALCIUM 8.8 11/21/2020    BILITOT 0.7 11/21/2020    ALKPHOS 91 11/21/2020    AST 43 11/21/2020    ALT 53 11/21/2020     Lab Results   Component Value Date    CRP 1.4 (H) 11/19/2020    CRP 8.6 (H) 11/17/2020    CRP 12.4 (H) 11/15/2020     Lab Results   Component Value Date    SEDRATE 80 (H) 11/12/2020     Radiology:  Chest x-ray shows bilateral interstitial infiltrates    Microbiology:   Streptococcus pneumoniae/Legionella urine Ag: Negative  Blood cultures 11/11/2020: Negative x2  Respiratory culture 11/17/2020: OP nikita reduced  Nares screen MRSA: Pending    No results for input(s): PROCAL in the last 72 hours. ASSESSMENT:  · Moderate to severe SARS-CoV-2 infection with pneumonia.   Completed 5 days of Remdesivir 11/16/2020. CRP is fluctuating. · Acute respiratory failure  · Lymphopenia secondary to SARS-CoV-2 infection  · Leukocytosis associated to the above and steroids, stable  · Probable community-acquired pneumonia.   Completed 7 days of Levofloxacin 11/18/2020  · Elevation of transaminases associated to the above, fluctuating    PLAN:  · Continue daily dexamethasone  · Anticoagulation  · Supportive treatment  · Consider changing lines    Discussed with Dr. Stefani Ward  7:02 AM  11/21/2020

## 2020-11-21 NOTE — PLAN OF CARE
restraint-related injury  Outcome: Met This Shift     Problem:  Body Temperature -  Risk of, Imbalanced  Goal: Will regain or maintain usual level of consciousness  Outcome: Not Met This Shift     Problem: Fatigue  Goal: Verbalize increase energy and improved vitality  Outcome: Not Met This Shift     Problem: Restraint Use - Nonviolent/Non-Self-Destructive Behavior:  Goal: Absence of restraint indications  Description: Absence of restraint indications  Outcome: Not Met This Shift

## 2020-11-21 NOTE — PROGRESS NOTES
Critical Care Team - Daily Progress Note         Date and time: 11/21/2020 4:46 PM  Patient's name:  Lolita Barraza Record Number: [id number]  Patient's account/billing number: [de-identified]  Patient's YOB: 1955  Age: 77 y.o. Date of Admission: 11/11/2020  2:12 PM  Length of stay during current admission: 10      Primary Care Physician: No primary care provider on file. ICU Attending Physician: Dr. Afshin Polanco    Code Status: Full Code    Reason for ICU admission: Acute hypoxic respiratory failure secondary to COVID-19 pneumonia      SUBJECTIVE:     BRIEF HISTORY: History is gathered from medical records, patient was in respiratory distress on BiPAP, emergently being intubated on arrival in the ICU.     Mansi pearce 65 y.o. arrived to the ED 11/11 with fever, nausea, vomiting.  He reports the symptoms began 6 days prior. Loss of appetite red green-white productive sputum, urinary frequency, diarrhea rhinorrhea, nasal congestion chest pain right upper quadrant abdominal pain.  He had been on a Z-Michael from his primary physician no improvement in symptoms he complains of right sided pleuritic chest pain with cough.  ABG within normal range, leukocytosis, lymphopenia elevated D-dimer greater than 5250 he was started on heparin drip, ferritin 1710 and admitted await ID consult for treatment will need remdesivir, infectious work-up.  He arrived hypoxic was placed on 15 L nonrebreather he denies history of PEs or DVTs      Patient was admitted to the hospital, was on BiPAP, OptiFlow. On 11/15/2020, patient was unable to obtain O2 sat above upper 80s, was in respiratory distress with respiratory rate of 40-50/min. Because of his increasing respiratory distress on BiPAP, patient was transferred to ICU, intubated. He has been followed by pulmonology, infectious disease throughout hospital stay.      OVERNIGHT EVENTS:    11/16/20: Some soft BPs in the evening after being proned, Silas-Synephrine fentaNYL 5 mcg/ml in 0.9%  ml infusion 200 mcg/hr (11/21/20 1248)    norepinephrine 6 mcg/min (11/21/20 1556)    propofol 30 mcg/kg/min (11/21/20 1248)    dextrose       PRN Meds:   sodium chloride flush, 10 mL, PRN  heparin flush, 3 mL, PRN  glucose, 15 g, PRN  dextrose, 12.5 g, PRN  glucagon (rDNA), 1 mg, PRN  dextrose, 100 mL/hr, PRN  sodium chloride, 30 mL, PRN  sodium chloride flush, 10 mL, PRN  acetaminophen, 650 mg, Q6H PRN    Or  acetaminophen, 650 mg, Q6H PRN  polyethylene glycol, 17 g, Daily PRN  promethazine, 12.5 mg, Q6H PRN    Or  ondansetron, 4 mg, Q6H PRN        VENT SETTINGS (Comprehensive) (if applicable):  Vent Information  $Ventilation: $Subsequent Day  Skin Assessment: Clean, dry, & intact  Equipment ID: [id number]  Equipment Changed: Airway securing device  Vent Type: [id number]  Vent Mode: AC/VC  Vt Ordered: 500 mL  Rate Set: 26 bmp  Peak Flow: 85 L/min  Pressure Support: 0 cmH20  FiO2 : 75 %  SpO2: 99 %  SpO2/FiO2 ratio: 132  Sensitivity: 3  PEEP/CPAP: 12  I Time/ I Time %: 0 s  Humidification Source: Heated wire  Humidification Temp: 37  Humidification Temp Measured: 36.9  Circuit Condensation: Drained  Mask Type: Full face mask  Mask Size: Medium  Additional Respiratory  Assessments  Pulse: 86  Resp: 26  SpO2: 99 %  Position: Semi-Barnett's  Humidification Source: Heated wire  Humidification Temp: 37  Circuit Condensation: Drained  Oral Care: Mouth swabbed, Mouth suctioned, Mouth moisturizer  Subglottic Suction Done?: Yes  Cuff Pressure (cm H2O): 29 cm H2O    ABGs:   Recent Labs     11/21/20  0626   PH 7.403   PCO2 52.5*   PO2 82.7   HCO3 32.0*   BE 5.8*   O2SAT 95.4       Laboratory findings:    Complete Blood Count:   Recent Labs     11/19/20  0553 11/20/20  0515 11/21/20  0550   WBC 13.2* 14.1* 23.1*   HGB 13.0 13.7 14.1   HCT 41.2 43.3 45.2    345 386        Last 3 Blood Glucose:   Recent Labs     11/19/20  0553 11/20/20  0515 11/21/20  0550   GLUCOSE 181* 217* 199*        PT/INR: Lab Results   Component Value Date    PROTIME 10.9 02/16/2012    INR 0.9 02/16/2012     PTT:    Lab Results   Component Value Date    APTT 103.3 11/16/2020       Comprehensive Metabolic Profile:   Recent Labs     11/19/20  0553 11/20/20  0515 11/21/20  0550   * 148* 146   K 4.6 4.0 4.0   * 110* 105   CO2 28 31* 32*   BUN 31* 35* 33*   CREATININE 1.0 1.1 1.0   GLUCOSE 181* 217* 199*   CALCIUM 8.4* 8.7 8.8   PROT 5.7* 5.9* 6.2*   LABALBU 2.4* 2.6* 3.0*   BILITOT 0.4 0.5 0.7   ALKPHOS 96 95 91   AST 39 39 43*   ALT 44* 48* 53*      Magnesium:   Lab Results   Component Value Date    MG 2.2 11/21/2020     Phosphorus:   Lab Results   Component Value Date    PHOS 4.3 11/21/2020     Ionized Calcium: No results found for: CAION       Troponin: No results for input(s): TROPONINI in the last 72 hours. Microbiology:  Cultures during this admission:     Blood cultures:                 [] None drawn      [x] Negative             []  Positive (Details:  )  Urine Culture:                   [x] None drawn      [] Negative             []  Positive (Details:  )  Sputum Culture:               [] None drawn       [] Negative             [x]  Positive (Details: repeat sent today 11/21 pending)  Endotracheal aspirate:     [] None drawn       [] Negative             [x]  Positive (Details: repeat send today pending)  Other pertinent Labs: Strep and legionella ag negative    Radiology/Imaging:     Chest x-ray:  11/20/20  FINDINGS:    Lines and tubes are stable.  Heart size and mediastinal contours are    unchanged.  The lungs are stable.         Impression    No change.                 ASSESSMENT:     Active Problems:    Hypoxia  Resolved Problems:    * No resolved hospital problems.  *      Additional assessment:    COVID-19 pneumonia  Acute Respiratory failure secondary to COVID-19 pneumonia  Community acquired bacterial pneumonia  Hypotension -resolved  hyperglycemia        PLAN:     WEAN PER PROTOCOL:  [] No   [x] Yes  [] N/A    DISCONTINUE ANY LABS:   [x] No   [] Yes    ICU PROPHYLAXIS:  Stress ulcer:  [x] PPI Agent  [] [id number] [] Sucralfate  [] Other:  VTE:  [x] Enoxaparin  [] Unfract. Heparin Subcut  [] EPC Cuffs    NUTRITION:  [] NPO [] Tube Feeding (Specify: ) [] TPN  [x] PO (Diet: DIET TUBE FEED CONTINUOUS/CYCLIC NPO; Semi-elemental; Orogastric; Continuous; 20; 65  Diet Tube Feed Modular: Protein Modular)    HOME MEDICATIONS RECONCILED: [] No  [] Yes    INSULIN DRIP:   [x] No   [] Yes    CONSULTATION NEEDED:  [x] No   [] Yes    FAMILY UPDATED:    [] No   [] Yes    TRANSFER OUT OF ICU:   [x] No   [] Yes    ADDITIONAL PLAN:  SYSTEMS ASSESSMENT     Neuro   Intubated, sedated   -Stop Versed. Propofol and fentanyl only. Add Seroquel. Off paralytics     Respiratory   Acute hypoxic respiratory failure secondary to COVID-19 pneumonia  -Was initially on BiPAP, was intubated 11/15  -ID following  -levofloxacin--finished for presumed community-acquired pneumonia superimposed on COVID-19 PNA  -Patient tolerating supine. Wean FiO2 and PEEP as able. Keep plateau pressures less than 30.  -Repeat respiratory culture    Superimposed PNA-completed 7 days of Levaquin on 11/18     Cardiovascular   Hypotension--shock? Dehydration?   -On midodrine. Monitor pressures.   -Levo as needed for MAP greater than 65   --1.5 L, 500 cc bolus and monitor.   Stop Lasix    EKG to check QTC for possible Seroquel     Gastrointestinal   Tube feeds     Renal   Hypernatremia-resolved  negtive1.5L- Hold Lasix, 500 cc bolus     Infectious Disease   COVID-19 pneumonia   -On Decadron, decreased to daily- day 10. remdesivir-finished    Superimposed community-acquired pneumonia,   - followed by ID, finish 7 days of Levaquin    Leukocytosis   -Repeat blood culture   -Respiratory culture   -Urinalysis   -Remove CVC and art line, place PICC     Hematology/Oncology   Hypercoagulability secondary to Covid pneumonia   -Dimer increased, continue therapeutic dose Lovenox     Endocrine   Hyperglycemia related to steroids -high-dose sliding scale insulin, Lantus 10 units daily     Social/Spiritual/DNR/Other   Full code      Diet: DIET TUBE FEED CONTINUOUS/CYCLIC NPO; Semi-elemental; Orogastric; Continuous; 20; 65  Diet Tube Feed Modular: Protein Modular  CODE Status: Full Code    Dispo: Maintain ICU level of care    1. As above  2. Wean FiO2 and PEEP as able  3. PICC line today  4. Infectious work-up, follow cultures  5. Start Seroquel, stop Versed  6. GI prophylaxis - protonix  7. DVT Prophylaxis - lovenox BID  8. Discuss case and plan with attending, Dr. Roland Latif DO  Resident, PGY-2  11/21/2020  4:46 PM     I personally saw, examined and provided care for the patient. Radiographs, labs and medication list were reviewed by me independently. I spoke with bedside nursing, therapists and consultants. Critical care services and times documented are independent of procedures and multidisciplinary rounds with Residents. Additionally comprehensive, multidisciplinary rounds were conducted with the MICU team. The case was discussed in detail and plans for care were established. Review of Residents documentation was conducted and revisions were made as appropriate. I agree with the above documented exam, problem list and plan of care with the following additions:    Remains critically ill on mechanical ventilation  FiO2 on vent has waxed and waned  He has required a significant amount of sedation - prefer he comes completely off of versed at this point  Start seroquel, continue propofol/fentanyl  Replace lines   Supportive care    37 minutes of CCT spent with the patient, reviewing the chart including imaging studies, and discussing the case with other health care professionals. This time excludes procedures.      David Gordon MD

## 2020-11-22 NOTE — PLAN OF CARE
pharmacologic interventions.   Goal: Pain level will decrease  Description: Pain level will decrease  Outcome: Met This Shift  Goal: Control of acute pain  Description: Control of acute pain  Outcome: Met This Shift  Goal: Control of chronic pain  Description: Control of chronic pain  Outcome: Met This Shift     Problem: Skin Integrity:  Goal: Will show no infection signs and symptoms  Description: Will show no infection signs and symptoms  Outcome: Met This Shift  Goal: Absence of new skin breakdown  Description: Absence of new skin breakdown  Outcome: Met This Shift     Problem: Restraint Use - Nonviolent/Non-Self-Destructive Behavior:  Goal: Absence of restraint indications  Description: Absence of restraint indications  Outcome: Met This Shift  Goal: Absence of restraint-related injury  Description: Absence of restraint-related injury  Outcome: Met This Shift

## 2020-11-22 NOTE — PROGRESS NOTES
[facility] Hospitalist   Progress Note    Admitting Date and Time: 11/11/2020  2:12 PM  Admit Dx: Hypoxia [R09.02]  Hypoxia [R09.02]    Subjective: Admitted on 11th, presented with shortness of breath, hypoxic, prior tobacco abuse, initial impression of Covid pneumonia, also THOMAS. Patient was started on dexamethasone. Did have evidence of elevated transaminases. Seen by pulmonary, severe COVID-19 with possible bacterial pneumonia. Patient with multifocal groundglass opacities. D-dimer more than 5250, patient was on heparin drip now on Lovenox 110 mg twice a day. Also received remdesivir  for full  5 days as per ID recommendations. On NIV as of 14th. Patient also remained on vitamin C as well as zinc.  Required intubation on 15th. Which improving hypotension patient is off pressors as of 18th. ID has continued dexamethasone and recommended changing lines. Patient with GPC bacteremia in clusters, ID has started Vanco, continued dexamethasone, removal of A-line, unfortunately poor prognosis. Patient was admitted with Hypoxia [R09.02]  Hypoxia [R09.02]. Patient resting, comfortable, off paralytics, sedated with fentanyl and Versed, in ICU, on vent, under airborne isolation. No communication. Per RN: Issues with worsening hypoxemia, has been started on tube feeding, tolerating well. Persistent difficulty with agitation even though on Seroquel. Had been tachypneic as well as tachycardic. ROS: denies fever, chills, cp, sob, n/v, HA unless stated above.      heparin flush  3 mL Intravenous 2 times per day    QUEtiapine  25 mg Oral BID    insulin glargine  10 Units Subcutaneous Nightly    insulin lispro  0-18 Units Subcutaneous Q6H    midodrine  10 mg Oral TID WC    dexamethasone  6 mg Oral Daily    pantoprazole  40 mg Intravenous Daily    And    sodium chloride (PF)  10 mL Intravenous Daily    enoxaparin  110 mg Subcutaneous BID    chlorhexidine  15 mL Mouth/Throat BID    remdesivir, patient remains on Decadron, improvement in CRP. 3.          Patient with significantly elevated D-dimer, was on IV heparin, now remains on Lovenox 110 mg twice a day. 4.          Suspected bacterial pneumonia, now no longer on Levaquin, worsening leukocytosis. 5.          THOMAS, creatinine has improved back to normal level. 6.           Borderline BP, remains on midodrine. 7.           On GI prophylaxis. 8.            Hypotensive, on midodrine as well as pressors. 9.            Hyperglycemia, likely steroid induced, does remain on 10 of Lantus as well as sliding scale. 10.         Bacteremia, now on IV Vanco.  ID on board. 11.         Prognosis guarded.         Electronically signed by Nicolasa Pearce MD on 11/22/2020 at 8:51 AM

## 2020-11-22 NOTE — PLAN OF CARE
Problem:  Body Temperature -  Risk of, Imbalanced  Goal: Ability to maintain a body temperature within defined limits  11/22/2020 0928 by Cait Leal RN  Outcome: Met This Shift  11/22/2020 0104 by Silva Obando RN  Outcome: Met This Shift     Problem: Isolation Precautions - Risk of Spread of Infection  Goal: Prevent transmission of infection  11/22/2020 0104 by Silva Obando RN  Outcome: Met This Shift     Problem: Nutrition Deficits  Goal: Optimize nutrtional status  11/22/2020 0928 by Cait Leal RN  Outcome: Met This Shift  11/22/2020 0104 by Silva Obando RN  Outcome: Met This Shift     Problem: Risk for Fluid Volume Deficit  Goal: Maintain normal heart rhythm  11/22/2020 0928 by Cait Leal RN  Outcome: Met This Shift  11/22/2020 0104 by Silva Obando RN  Outcome: Met This Shift  Goal: Maintain absence of muscle cramping  11/22/2020 0104 by Silva Obando RN  Outcome: Met This Shift  Goal: Maintain normal serum potassium, sodium, calcium, phosphorus, and pH  11/22/2020 0928 by Cait Leal RN  Outcome: Met This Shift  11/22/2020 0104 by Silva Obando RN  Outcome: Met This Shift     Problem: Loneliness or Risk for Loneliness  Goal: Demonstrate positive use of time alone when socialization is not possible  11/22/2020 0104 by Silva Obando RN  Outcome: Met This Shift     Problem: Fatigue  Goal: Verbalize increase energy and improved vitality  11/22/2020 0104 by Silva Obando RN  Outcome: Met This Shift     Problem: Patient Education: Go to Patient Education Activity  Goal: Patient/Family Education  11/22/2020 0104 by Silva Obando RN  Outcome: Met This Shift     Problem: Falls - Risk of:  Goal: Will remain free from falls  Description: Will remain free from falls  11/22/2020 0928 by Cait Leal RN  Outcome: Met This Shift  11/22/2020 0104 by Silva Obando RN  Outcome: Met This Shift  Goal: Absence of physical injury  Description: Absence of physical injury  11/22/2020 0928 by Lucero Brennan RN  Outcome: Met This Shift  11/22/2020 0104 by Feliberto Elkins RN  Outcome: Met This Shift     Problem: Skin Integrity:  Goal: Will show no infection signs and symptoms  Description: Will show no infection signs and symptoms  11/22/2020 0928 by Lucero Brennan RN  Outcome: Met This Shift  11/22/2020 0104 by Feliberto Elkins RN  Outcome: Met This Shift  Goal: Absence of new skin breakdown  Description: Absence of new skin breakdown  11/22/2020 0928 by Lucero Brennan RN  Outcome: Met This Shift  11/22/2020 0104 by Feliberto Elkins RN  Outcome: Met This Shift     Problem: Restraint Use - Nonviolent/Non-Self-Destructive Behavior:  Goal: Absence of restraint-related injury  Description: Absence of restraint-related injury  11/22/2020 0928 by Lucero Brennan RN  Outcome: Met This Shift  11/22/2020 0104 by Feliberto Elkins RN  Outcome: Met This Shift     Problem: Airway Clearance - Ineffective  Goal: Achieve or maintain patent airway  11/22/2020 0928 by Lucero Brennan RN  Outcome: Ongoing  11/22/2020 0104 by Feliberto Elkins RN  Outcome: Met This Shift     Problem: Gas Exchange - Impaired  Goal: Absence of hypoxia  11/22/2020 0928 by Lucero Brennan RN  Outcome: Ongoing  11/22/2020 0104 by Feliberto Elkins RN  Outcome: Met This Shift  Goal: Promote optimal lung function  11/22/2020 0928 by Lucero Brennan RN  Outcome: Ongoing  11/22/2020 0104 by Feliberto Elkins RN  Outcome: Met This Shift     Problem:  Body Temperature -  Risk of, Imbalanced  Goal: Complications related to the disease process, condition or treatment will be avoided or minimized  11/22/2020 0928 by Lucero Brennan RN  Outcome: Ongoing  11/22/2020 0104 by Feliberto Elkins RN  Outcome: Met This Shift     Problem: Pain:  Goal: Pain level will decrease  Description: Pain level will decrease  11/22/2020 0928 by Lucero Brennan RN  Outcome: Ongoing  11/22/2020 0104 by Roshni Garcia RN  Outcome: Met This Shift  Goal: Control of acute pain  Description: Control of acute pain  11/22/2020 0928 by Jarad Hanson RN  Outcome: Ongoing  11/22/2020 0104 by Roshni Garcia RN  Outcome: Met This Shift  Goal: Control of chronic pain  Description: Control of chronic pain  11/22/2020 0928 by Jarad Hanson RN  Outcome: Ongoing  11/22/2020 0104 by Roshni Garcia RN  Outcome: Met This Shift     Problem: Breathing Pattern - Ineffective  Goal: Ability to achieve and maintain a regular respiratory rate  11/22/2020 0928 by Jarad Hanson RN  Outcome: Not Met This Shift  11/22/2020 0104 by Roshni Garcia RN  Outcome: Met This Shift     Problem:  Body Temperature -  Risk of, Imbalanced  Goal: Will regain or maintain usual level of consciousness  11/22/2020 0928 by Jarad Hanson RN  Outcome: Not Met This Shift  11/22/2020 0104 by Roshni Garcia RN  Outcome: Met This Shift     Problem: Restraint Use - Nonviolent/Non-Self-Destructive Behavior:  Goal: Absence of restraint indications  Description: Absence of restraint indications  11/22/2020 0928 by Jarad Hanson RN  Outcome: Not Met This Shift  11/22/2020 0104 by Roshni Garcia RN  Outcome: Met This Shift

## 2020-11-22 NOTE — PROGRESS NOTES
Critical Care Team - Daily Progress Note         Date and time: 11/22/2020 9:44 AM  Patient's name:  Alton Sheth Record Number: [id number]  Patient's account/billing number: [de-identified]  Patient's YOB: 1955  Age: 77 y.o. Date of Admission: 11/11/2020  2:12 PM  Length of stay during current admission: 11      Primary Care Physician: No primary care provider on file. ICU Attending Physician: Dr. Fraser Leader    Code Status: Full Code    Reason for ICU admission: Acute hypoxic respiratory failure secondary to COVID-19 pneumonia      SUBJECTIVE:     BRIEF HISTORY: History is gathered from medical records, patient was in respiratory distress on BiPAP, emergently being intubated on arrival in the ICU.     Palomo pearce 65 y.o. arrived to the ED 11/11 with fever, nausea, vomiting.  He reports the symptoms began 6 days prior. Loss of appetite red green-white productive sputum, urinary frequency, diarrhea rhinorrhea, nasal congestion chest pain right upper quadrant abdominal pain.  He had been on a Z-Michael from his primary physician no improvement in symptoms he complains of right sided pleuritic chest pain with cough.  ABG within normal range, leukocytosis, lymphopenia elevated D-dimer greater than 5250 he was started on heparin drip, ferritin 1710 and admitted await ID consult for treatment will need remdesivir, infectious work-up.  He arrived hypoxic was placed on 15 L nonrebreather he denies history of PEs or DVTs      Patient was admitted to the hospital, was on BiPAP, OptiFlow. On 11/15/2020, patient was unable to obtain O2 sat above upper 80s, was in respiratory distress with respiratory rate of 40-50/min. Because of his increasing respiratory distress on BiPAP, patient was transferred to ICU, intubated. He has been followed by pulmonology, infectious disease throughout hospital stay.      OVERNIGHT EVENTS:    11/16/20: Some soft BPs in the evening after being proned, Silas-Synephrine (36.9 °C), Min:97.7 °F (36.5 °C), Max:99.1 °F (37.3 °C)      Patient Vitals for the past 6 hrs:   Temp Temp src Pulse Resp SpO2   11/22/20 0900 -- -- 132 (!) 34 97 %   11/22/20 0800 99.1 °F (37.3 °C) Bladder 105 30 (!) 88 %   11/22/20 0745 -- -- 104 (!) 39 (!) 89 %   11/22/20 0730 -- -- 101 (!) 33 (!) 88 %   11/22/20 0715 -- -- 107 29 (!) 88 %   11/22/20 0700 -- -- 107 29 91 %   11/22/20 0645 -- -- 106 26 92 %   11/22/20 0630 -- -- 109 (!) 37 91 %   11/22/20 0615 -- -- 110 30 91 %   11/22/20 0600 -- -- 117 (!) 41 (!) 89 %   11/22/20 0545 -- -- 126 (!) 37 90 %   11/22/20 0540 -- -- 144 (!) 40 (!) 73 %   11/22/20 0535 -- -- 124 (!) 35 (!) 74 %   11/22/20 0530 -- -- 129 (!) 35 (!) 76 %   11/22/20 0525 -- -- 130 (!) 37 --   11/22/20 0520 -- -- 130 (!) 40 --   11/22/20 0515 -- -- 123 (!) 38 (!) 72 %   11/22/20 0510 -- -- 121 (!) 42 --   11/22/20 0505 -- -- 114 (!) 36 --   11/22/20 0500 -- -- 115 (!) 34 (!) 71 %   11/22/20 0455 -- -- 119 (!) 37 --   11/22/20 0450 -- -- 105 30 --   11/22/20 0445 -- -- 105 27 (!) 89 %   11/22/20 0440 -- -- 104 26 --   11/22/20 0435 -- -- 105 30 --   11/22/20 0430 -- -- 105 (!) 32 (!) 89 %   11/22/20 0425 -- -- 105 28 --   11/22/20 0420 -- -- 106 30 --   11/22/20 0415 -- -- 106 30 (!) 88 %   11/22/20 0410 -- -- 106 30 --   11/22/20 0405 -- -- 105 28 --   11/22/20 0400 99 °F (37.2 °C) Bladder 106 23 (!) 87 %   11/22/20 0345 -- -- 106 23 (!) 89 %         Intake/Output Summary (Last 24 hours) at 11/22/2020 0944  Last data filed at 11/22/2020 0900  Gross per 24 hour   Intake 5986.48 ml   Output 4150 ml   Net 1836.48 ml     Wt Readings from Last 2 Encounters:   11/20/20 236 lb 12.4 oz (107.4 kg)     Body mass index is 36 kg/m². PHYSICAL EXAMINATION:  General: Intubated, sedated, supine  Eyes: conjunctivae/corneas clear, sclera non icteric  Ears: no obvious scars, no lesions, no masses  Mouth: mucous membranes somewhat dry, no obvious oral sores.   ET tube in place  Head: normocephalic, atraumatic  Neck: no JVD, trachea midline  Lungs: Diminished bilaterally  Heart: tachycardic, regular rhythm, no murmur, normal S1, S2  Abdomen: soft, non-tender; bowel sounds normal; no masses, no organomegaly  Extremities: no lower extremity edema, extremities atraumatic, no cyanosis, no clubbing, 2+ pedal pulses palpated  Skin: normal color, normal texture, normal turgor, no rashes, no lesions  Neurologic: unable to assess   MEDICATIONS:    Scheduled Meds:   heparin flush  3 mL Intravenous 2 times per day    QUEtiapine  25 mg Oral BID    insulin glargine  10 Units Subcutaneous Nightly    insulin lispro  0-18 Units Subcutaneous Q6H    midodrine  10 mg Oral TID WC    pantoprazole  40 mg Intravenous Daily    And    sodium chloride (PF)  10 mL Intravenous Daily    enoxaparin  110 mg Subcutaneous BID    chlorhexidine  15 mL Mouth/Throat BID    influenza virus vaccine  0.5 mL Intramuscular Prior to discharge    sodium chloride flush  10 mL Intravenous 2 times per day    vitamin C  1,000 mg Oral BID    zinc sulfate  50 mg Oral BID    vitamin D3  400 Units Oral Daily     Continuous Infusions:   fentaNYL 5 mcg/ml in 0.9%  ml infusion 200 mcg/hr (11/22/20 0801)    norepinephrine 4 mcg/min (11/22/20 0938)    propofol 50 mcg/kg/min (11/22/20 0556)    dextrose       PRN Meds:   sodium chloride flush, 10 mL, PRN  heparin flush, 3 mL, PRN  glucose, 15 g, PRN  dextrose, 12.5 g, PRN  glucagon (rDNA), 1 mg, PRN  dextrose, 100 mL/hr, PRN  sodium chloride, 30 mL, PRN  sodium chloride flush, 10 mL, PRN  acetaminophen, 650 mg, Q6H PRN    Or  acetaminophen, 650 mg, Q6H PRN  polyethylene glycol, 17 g, Daily PRN  promethazine, 12.5 mg, Q6H PRN    Or  ondansetron, 4 mg, Q6H PRN        VENT SETTINGS (Comprehensive) (if applicable):  Vent Information  $Ventilation: $Subsequent Day  Skin Assessment: Clean, dry, & intact  Equipment ID: [id number]  Equipment Changed: Airway securing device  Vent Type: [id number]  Vent Mode: AC/VC  Vt Ordered: 500 mL  Rate Set: 26 bmp  Peak Flow: 85 L/min  Pressure Support: 0 cmH20  FiO2 : 100 %  SpO2: 97 %  SpO2/FiO2 ratio: 97  Sensitivity: 3  PEEP/CPAP: 12  I Time/ I Time %: 0 s  Humidification Source: Heated wire  Humidification Temp: 37  Humidification Temp Measured: 37  Circuit Condensation: Drained  Mask Type: Full face mask  Mask Size: Medium  Additional Respiratory  Assessments  Pulse: 132  Resp: (!) 34  SpO2: 97 %  Position: Semi-Barnett's  Humidification Source: Heated wire  Humidification Temp: 37  Circuit Condensation: Drained  Oral Care: Mouth suctioned, Mouthwash, Mouth swabbed  Subglottic Suction Done?: Yes  Cuff Pressure (cm H2O): 29 cm H2O    ABGs:   Recent Labs     11/22/20 0648   PH 7.444   PCO2 42.9   PO2 69.5*   HCO3 28.7*   BE 4.2*   O2SAT 93.3       Laboratory findings:    Complete Blood Count:   Recent Labs     11/20/20  0515 11/21/20  0550 11/22/20  0515   WBC 14.1* 23.1* 21.9*   HGB 13.7 14.1 13.6   HCT 43.3 45.2 43.1    386 302        Last 3 Blood Glucose:   Recent Labs     11/20/20  0515 11/21/20  0550 11/22/20  0515   GLUCOSE 217* 199* 187*        PT/INR:    Lab Results   Component Value Date    PROTIME 10.9 02/16/2012    INR 0.9 02/16/2012     PTT:    Lab Results   Component Value Date    APTT 103.3 11/16/2020       Comprehensive Metabolic Profile:   Recent Labs     11/20/20  0515 11/21/20  0550 11/22/20  0515   * 146 143   K 4.0 4.0 4.2   * 105 103   CO2 31* 32* 31*   BUN 35* 33* 32*   CREATININE 1.1 1.0 0.9   GLUCOSE 217* 199* 187*   CALCIUM 8.7 8.8 9.0   PROT 5.9* 6.2* 6.1*   LABALBU 2.6* 3.0* 2.6*   BILITOT 0.5 0.7 0.5   ALKPHOS 95 91 94   AST 39 43* 42*   ALT 48* 53* 48*      Magnesium:   Lab Results   Component Value Date    MG 2.0 11/22/2020     Phosphorus:   Lab Results   Component Value Date    PHOS 3.7 11/22/2020     Ionized Calcium: No results found for: JAKE       Troponin: No results for input(s): TROPONINI in the last 72 hours.    Microbiology:  Cultures during this admission:     Blood cultures:                 [] None drawn      [x] Negative             []  Positive (Details:  )  Urine Culture:                   [x] None drawn      [] Negative             []  Positive (Details:  )  Sputum Culture:               [] None drawn       [] Negative             [x]  Positive (Details: repeat sent today 11/21 pending)  Endotracheal aspirate:     [] None drawn       [] Negative             [x]  Positive (Details: repeat send today pending)  Other pertinent Labs: Strep and legionella ag negative    Radiology/Imaging:     Chest x-ray:  11/20/20  FINDINGS:    Lines and tubes are stable.  Heart size and mediastinal contours are    unchanged.  The lungs are stable.         Impression    No change.                 ASSESSMENT:     Active Problems:    Hypoxia  Resolved Problems:    * No resolved hospital problems. *      Additional assessment:    COVID-19 pneumonia  Acute Respiratory failure secondary to COVID-19 pneumonia  Community acquired bacterial pneumonia  Hypotension -resolved  hyperglycemia        PLAN:     WEAN PER PROTOCOL:  [] No   [x] Yes  [] N/A    DISCONTINUE ANY LABS:   [x] No   [] Yes    ICU PROPHYLAXIS:  Stress ulcer:  [x] PPI Agent  [] [id number] [] Sucralfate  [] Other:  VTE:  [x] Enoxaparin  [] Unfract. Heparin Subcut  [] EPC Cuffs    NUTRITION:  [] NPO [] Tube Feeding (Specify: ) [] TPN  [x] PO (Diet: DIET TUBE FEED CONTINUOUS/CYCLIC NPO; Semi-elemental; Orogastric; Continuous; 20; 65  Diet Tube Feed Modular: Protein Modular)    HOME MEDICATIONS RECONCILED: [] No  [] Yes    INSULIN DRIP:   [x] No   [] Yes    CONSULTATION NEEDED:  [x] No   [] Yes    FAMILY UPDATED:    [] No   [] Yes    TRANSFER OUT OF ICU:   [x] No   [] Yes    ADDITIONAL PLAN:  SYSTEMS ASSESSMENT     Neuro   Intubated, sedated   -Stop Versed. Propofol and fentanyl only. Add Seroquel. Off paralytics   -1 dose of Versed overnight.   Add PRNs?     Respiratory Acute hypoxic respiratory failure secondary to COVID-19 pneumonia  -Was initially on BiPAP, was intubated 11/15  -ID following  -levofloxacin--finished for presumed community-acquired pneumonia superimposed on COVID-19 PNA  -Patient tolerating supine. Wean FiO2 and PEEP as able. Keep plateau pressures less than 30.  -Repeat respiratory culture    Superimposed PNA-completed 7 days of Levaquin on 11/18     Cardiovascular   Hypotension--shock? Dehydration?   -On midodrine. Monitor pressures.   -Levo as needed for MAP greater than 65. intermittently need   --1.5 L, 500 cc bolus and monitor. Stop Lasix    EKG to check QTC for possible Seroquel     Gastrointestinal   Tube feeds     Renal   Hypernatremia-resolved  Holding Lasix     Infectious Disease   COVID-19 pneumonia   -On Decadron, decreased to daily--finished. remdesivir-finished    Superimposed community-acquired pneumonia,   - followed by ID, finish 7 days of Levaquin    Leukocytosis   -Repeat blood culture   -Respiratory culture   -Urinalysis   -PICC (cvc removed)   -remove art line and replace soon, day 7     Hematology/Oncology   Hypercoagulability secondary to Covid pneumonia   -Dimer now down trending, decrease lovenox to prophylactic dose? ?     Endocrine   Hyperglycemia related to steroids -high-dose sliding scale insulin, Lantus 10 units daily. --Better controlled     Social/Spiritual/DNR/Other   Full code      Diet: DIET TUBE FEED CONTINUOUS/CYCLIC NPO; Semi-elemental; Orogastric; Continuous; 20; 65  Diet Tube Feed Modular: Protein Modular  CODE Status: Full Code    Dispo: Maintain ICU level of care    1. As above  2. Wean FiO2 and PEEP as able  3. PICC line yesterday  4. Change art line today or tomorrow  5. Off decadron  6. GI prophylaxis - protonix  7. DVT Prophylaxis - lovenox BID  8.  Discuss case and plan with attending, Dr. Lesly Latif DO  Resident, PGY-2  11/22/2020  9:44 AM     I personally saw, examined and provided care for the patient. Radiographs, labs and medication list were reviewed by me independently. I spoke with bedside nursing, therapists and consultants. Critical care services and times documented are independent of procedures and multidisciplinary rounds with Residents. Additionally comprehensive, multidisciplinary rounds were conducted with the MICU team. The case was discussed in detail and plans for care were established. Review of Residents documentation was conducted and revisions were made as appropriate. I agree with the above documented exam, problem list and plan of care with the following additions:    Literally was nearly out of bed last night and therefore essentially de-recruited any lung we had improved. He is now back on 100% FiO2 and PEEP 14  Increase sedation and schedule oral meds  GPC bacteremia for which he is now on vancomycin  Has new picc  Remove arterial line  Prognosis poor    37 minutes of CCT spent with the patient, reviewing the chart including imaging studies, and discussing the case with other health care professionals. This time excludes procedures.      David Gordon MD

## 2020-11-22 NOTE — PATIENT CARE CONFERENCE
Intensive Care Daily Quality Rounding Checklist        ICU Team Members: Dr. Lexa Dias, charge nurse, bedside nurse, respiratory therapist     ICU Day #: NUMBER: 8     Intubation Date: November 15     Ventilator Day #: NUMBER: [address] Insertion Phil Lapping                                                    NLB #: NUMBER: 8      Arterial Line Insertion Date: Shayna Madrigal                             CNA #: NUMBER: 8     DVT Prophylaxis: Lovenox     GI Prophylaxis: Protonix      Martinez Catheter Insertion Marcos Lapping                                        Day #: 8                             Continued need (if yes, reason documented and discussed with physician): yes, strict I/O, critically ill pt      Skin Issues/ Wounds and ordered treatment discussed on rounds: SOS precautions      Goals/ Plans for the Day: Versed, Blood cultures, increase lantus, continue critical care management

## 2020-11-22 NOTE — PROGRESS NOTES
[address] [place] [address] [facility]  [facility]  Progress Note    SUBJECTIVE:  Chief Complaint   Patient presents with    Shortness of Breath     76% room air at arrival     Fever    Emesis     Patient is still in the ICU. He is still sedated but no longer paralyzed. He is very restless and breathing over the ventilator. Minimal secretions from the ET tube. Review of systems:  As stated above in the chief complaint, otherwise negative. Medications:  Scheduled Meds:   heparin flush  3 mL Intravenous 2 times per day    QUEtiapine  25 mg Oral BID    insulin glargine  10 Units Subcutaneous Nightly    insulin lispro  0-18 Units Subcutaneous Q6H    midodrine  10 mg Oral TID WC    dexamethasone  6 mg Oral Daily    pantoprazole  40 mg Intravenous Daily    And    sodium chloride (PF)  10 mL Intravenous Daily    enoxaparin  110 mg Subcutaneous BID    chlorhexidine  15 mL Mouth/Throat BID    influenza virus vaccine  0.5 mL Intramuscular Prior to discharge    sodium chloride flush  10 mL Intravenous 2 times per day    vitamin C  1,000 mg Oral BID    zinc sulfate  50 mg Oral BID    vitamin D3  400 Units Oral Daily     Continuous Infusions:   fentaNYL 5 mcg/ml in 0.9%  ml infusion 200 mcg/hr (20 0801)    norepinephrine 8 mcg/min (20 0722)    propofol 50 mcg/kg/min (20 0556)    dextrose       PRN Meds:sodium chloride flush, heparin flush, glucose, dextrose, glucagon (rDNA), dextrose, sodium chloride, sodium chloride flush, acetaminophen **OR** acetaminophen, polyethylene glycol, promethazine **OR** ondansetron    OBJECTIVE:  /62   Pulse 105   Temp 99.1 °F (37.3 °C) (Bladder)   Resp 30   Ht 5' 8\" (1.727 m)   Wt 236 lb 12.4 oz (107.4 kg)   SpO2 (!) 88%   BMI 36.00 kg/m²   Temp  Av.4 °F (36.9 °C)  Min: 97.7 °F (36.5 °C)  Max: 99.1 °F (37.3 °C)  Constitutional: The patient is lying in bed. He is intubated, sedated and paralyzed. He is supinated.   FiO2 up to 100%. PEEP up to 14. Skin: Dry. No rashes were noted. HEENT: Round pupils. No jaundice. ET tube. OG tube. Neck: Supple to movements. Chest: Good breath sounds bilaterally. No crackles were heard. Cardiovascular: Heart sounds rhythmic and regular. Abdomen: Diminished bowel sounds. Soft to palpation. Extremities: No edema. Lines: Right PICC 11/21/2020.     Laboratory and Tests Review:  Lab Results   Component Value Date    WBC 21.9 (H) 11/22/2020    WBC 23.1 (H) 11/21/2020    WBC 14.1 (H) 11/20/2020    HGB 13.6 11/22/2020    HCT 43.1 11/22/2020    MCV 90.2 11/22/2020     11/22/2020     Lab Results   Component Value Date    NEUTROABS 20.56 (H) 11/21/2020    NEUTROABS 12.55 (H) 11/20/2020    NEUTROABS 11.35 (H) 11/19/2020     No results found for: CRPHS  Lab Results   Component Value Date    ALT 48 (H) 11/22/2020    AST 42 (H) 11/22/2020    ALKPHOS 94 11/22/2020    BILITOT 0.5 11/22/2020     Lab Results   Component Value Date     11/22/2020    K 4.2 11/22/2020    K 3.7 11/15/2020     11/22/2020    CO2 31 11/22/2020    BUN 32 11/22/2020    CREATININE 0.9 11/22/2020    CREATININE 1.0 11/21/2020    CREATININE 1.1 11/20/2020    GFRAA >60 11/22/2020    LABGLOM >60 11/22/2020    GLUCOSE 187 11/22/2020    GLUCOSE 154 02/16/2012    PROT 6.1 11/22/2020    LABALBU 2.6 11/22/2020    CALCIUM 9.0 11/22/2020    BILITOT 0.5 11/22/2020    ALKPHOS 94 11/22/2020    AST 42 11/22/2020    ALT 48 11/22/2020     Lab Results   Component Value Date    CRP 0.5 (H) 11/21/2020    CRP 1.4 (H) 11/19/2020    CRP 8.6 (H) 11/17/2020     Lab Results   Component Value Date    SEDRATE 80 (H) 11/12/2020     Radiology:  Chest x-ray shows bilateral interstitial infiltrates    Microbiology:   Streptococcus pneumoniae/Legionella urine Ag: Negative  Blood cultures 11/11/2020: Negative x2  Respiratory culture 11/17/2020: OP nikita reduced  Nares screen MRSA: Pending    No results for input(s): PROCAL in the last 72 hours.    ASSESSMENT:  · Moderate to severe SARS-CoV-2 infection with pneumonia. Completed 5 days of Remdesivir 11/16/2020. CRP is fluctuating. · Acute respiratory failure  · Lymphopenia secondary to SARS-CoV-2 infection  · Leukocytosis associated to the above and steroids, stable  · Probable community-acquired pneumonia. Completed 7 days of Levofloxacin 11/18/2020  · Elevation of transaminases associated to the above, fluctuating  · GPC in clusters bacteremia. Probable CLABSI versus contaminant. TLC was removed but not cultured. A-line could not be changed    PLAN:  · Start Vancomycin.   Repeat blood cultures   · Continue daily dexamethasone  · Anticoagulation  · Supportive treatment  · Arterial should be removed and tip culture  · Prognosis is poor    Discussed with nursing    Juan C Maurice  8:53 AM  11/22/2020

## 2020-11-23 NOTE — PATIENT CARE CONFERENCE
Intensive Care Daily Quality Rounding Checklist        ICU Team Members: Dr. Kandice Beauchamp, charge nurse, bedside nurse, respiratory therapist     ICU Day #: NUMBER: 9     Intubation Date: November 15     Ventilator Day #: NUMBER: 9     Central Line Insertion Date: PICC Gerre Ards                                                    QPS #: NUMBER: 2      Arterial Line Insertion Date: Steve Closs                             OLZ #: NUMBER: 9     DVT Prophylaxis: Lovenox     GI Prophylaxis: Protonix      Martinez Catheter Insertion Marisol Meeter                                        Day #: 9                             Continued need (if yes, reason documented and discussed with physician): yes, strict I/O, critically ill pt      Skin Issues/ Wounds and ordered treatment discussed on rounds: SOS precautions      Goals/ Plans for the Day:vent management, monitor labs and replace as needed, start Depakote,  Remove and replace arterial line, central line e- replacement orders, repeat blood gases today, lorrie

## 2020-11-23 NOTE — PROGRESS NOTES
[address] [place] [address] [facility]  [facility]  Progress Note    SUBJECTIVE:  Chief Complaint   Patient presents with    Shortness of Breath     76% room air at arrival     Fever    Emesis     The patient is still in the ICU. He had to go back on paralytics. Still on a ventilator. Running low-grade fevers. Review of systems:  As stated above in the chief complaint, otherwise negative.     Medications:  Scheduled Meds:   midazolam  4 mg Intravenous Once    vancomycin  1,000 mg Intravenous Q12H    insulin glargine  20 Units Subcutaneous Nightly    oxyCODONE  10 mg Oral Q6H    heparin flush  3 mL Intravenous 2 times per day    QUEtiapine  25 mg Oral BID    insulin lispro  0-18 Units Subcutaneous Q6H    midodrine  10 mg Oral TID WC    pantoprazole  40 mg Intravenous Daily    And    sodium chloride (PF)  10 mL Intravenous Daily    enoxaparin  110 mg Subcutaneous BID    chlorhexidine  15 mL Mouth/Throat BID    influenza virus vaccine  0.5 mL Intramuscular Prior to discharge    sodium chloride flush  10 mL Intravenous 2 times per day    vitamin C  1,000 mg Oral BID    zinc sulfate  50 mg Oral BID    vitamin D3  400 Units Oral Daily     Continuous Infusions:   cisatracurium (NIMBEX) infusion 2.5 mcg/kg/min (20 0710)    midazolam 10 mg/hr (20 0434)    fentaNYL 5 mcg/ml in 0.9%  ml infusion 200 mcg/hr (20 0605)    norepinephrine 10 mcg/kg/min (20 0645)    propofol 40 mcg/kg/min (20 0715)    dextrose       PRN Meds:sodium chloride flush, heparin flush, glucose, dextrose, glucagon (rDNA), dextrose, sodium chloride, sodium chloride flush, acetaminophen **OR** acetaminophen, polyethylene glycol, promethazine **OR** ondansetron    OBJECTIVE:  BP (!) 100/55   Pulse 88   Temp 98.7 °F (37.1 °C) (Bladder)   Resp 26   Ht 5' 8\" (1.727 m)   Wt 236 lb 12.4 oz (107.4 kg)   SpO2 97%   BMI 36.00 kg/m²   Temp  Av.2 °F (37.3 °C)  Min: 98.7 °F (37.1 °C)  Max: cultures 11/11/2020: Negative x2  Blood cultures 11/21/2020: CoNS in 1 of 2 sets  Of blood cultures 1/22/2020: Negative so far  Respiratory culture 11/17/2020: OP nikita reduced  Nares screen MRSA: Pending    No results for input(s): PROCAL in the last 72 hours. ASSESSMENT:  · Moderate to severe SARS-CoV-2 infection with pneumonia. Completed 5 days of Remdesivir 11/16/2020. CRP came down nicely but then increased when he had fevers associated to bloodstream infection. Normalizing after TLC was removed  · Acute respiratory failure  · Lymphopenia secondary to SARS-CoV-2 infection  · CoNS bacteremia. Probable CLABSI versus contaminant. TLC was removed but not cultured. A-line could not be changed  · Fevers associated to possible CLABSI  · Leukocytosis associated to the above  · Probable community-acquired pneumonia. Completed 7 days of Levofloxacin 11/18/2020  · Elevation of transaminases associated to the above, improved    PLAN:  · Continue Vancomycin.   Check trough today  · Consider weaning dexamethasone  · Anticoagulation  · Supportive treatment  · Arterial should be removed and tip cultured  · Prognosis is poor    Chetna Alba  8:46 AM  11/23/2020

## 2020-11-23 NOTE — PROGRESS NOTES
[facility] CARE AT [place] Hospitalist   Progress Note    Admitting Date and Time: 11/11/2020  2:12 PM  Admit Dx: Hypoxia [R09.02]  Hypoxia [R09.02]    Subjective: Admitted on 11th, presented with shortness of breath, hypoxic, prior tobacco abuse, initial impression of Covid pneumonia, also THOMAS. Patient was started on dexamethasone. Did have evidence of elevated transaminases. Seen by pulmonary, severe COVID-19 with possible bacterial pneumonia. Patient with multifocal groundglass opacities. D-dimer more than 5250, patient was on heparin drip now on Lovenox 110 mg twice a day. Also received remdesivir  for full  5 days as per ID recommendations. On NIV as of 14th. Patient also remained on vitamin C as well as zinc.  Required intubation on 15th. Which improving hypotension patient is off pressors as of 18th. ID has continued dexamethasone and recommended changing lines. Patient with GPC bacteremia in clusters, ID has started Vanco, continued dexamethasone, removal of A-line, unfortunately poor prognosis. Patient was admitted with Hypoxia [R09.02]  Hypoxia [R09.02]. Patient resting, comfortable, off paralytics, sedated with fentanyl and Versed, in ICU, on vent, under airborne isolation. No communication. Per RN: Worsening hypoxemia. ROS: denies fever, chills, cp, sob, n/v, HA unless stated above.      QUEtiapine  25 mg Oral BID    divalproex  500 mg Oral 2 times per day    vancomycin  1,000 mg Intravenous Q12H    insulin glargine  20 Units Subcutaneous Nightly    oxyCODONE  10 mg Oral Q6H    heparin flush  3 mL Intravenous 2 times per day    insulin lispro  0-18 Units Subcutaneous Q6H    midodrine  10 mg Oral TID WC    pantoprazole  40 mg Intravenous Daily    And    sodium chloride (PF)  10 mL Intravenous Daily    enoxaparin  110 mg Subcutaneous BID    chlorhexidine  15 mL Mouth/Throat BID    influenza virus vaccine  0.5 mL Intramuscular Prior to discharge    sodium chloride flush 10 mL Intravenous 2 times per day    vitamin C  1,000 mg Oral BID    zinc sulfate  50 mg Oral BID    vitamin D3  400 Units Oral Daily     magnesium sulfate, 1 g, PRN  potassium chloride, 20 mEq, PRN  sodium phosphate IVPB, 0.16 mmol/kg, PRN    Or  sodium phosphate IVPB, 0.32 mmol/kg, PRN  sodium chloride flush, 10 mL, PRN  heparin flush, 3 mL, PRN  glucose, 15 g, PRN  dextrose, 12.5 g, PRN  glucagon (rDNA), 1 mg, PRN  dextrose, 100 mL/hr, PRN  sodium chloride, 30 mL, PRN  sodium chloride flush, 10 mL, PRN  acetaminophen, 650 mg, Q6H PRN    Or  acetaminophen, 650 mg, Q6H PRN  polyethylene glycol, 17 g, Daily PRN  promethazine, 12.5 mg, Q6H PRN    Or  ondansetron, 4 mg, Q6H PRN         Objective:    BP (!) 100/55   Pulse 99   Temp 98.4 °F (36.9 °C) (Bladder)   Resp 27   Ht 5' 8\" (1.727 m)   Wt 236 lb 12.4 oz (107.4 kg)   SpO2 95%   BMI 36.00 kg/m²     Due to the patient's COVID-19-positive status, to reduce exposure, contamination, and in an effort to conserve PPE, this patient was evaluated through a combination of review of the medical record, nursing assessments, other physicians' exams and assessments, as well as telemedicine interaction. Limited examination to include heart sounds S1 and S2.  Lungs show scattered crackles. Abdomen is soft, no masses felt. Trace edema.     Recent Labs     11/21/20  0550 11/22/20  0515 11/23/20  0540    143 140   K 4.0 4.2 3.8    103 100   CO2 32* 31* 30*   BUN 33* 32* 28*   CREATININE 1.0 0.9 0.9   GLUCOSE 199* 187* 176*   CALCIUM 8.8 9.0 8.4*       Recent Labs     11/21/20  0550 11/22/20  0515 11/23/20  0540   WBC 23.1* 21.9* 16.8*   RBC 5.02 4.78 4.21   HGB 14.1 13.6 12.0*   HCT 45.2 43.1 37.8   MCV 90.0 90.2 89.8   MCH 28.1 28.5 28.5   MCHC 31.2* 31.6* 31.7*   RDW 16.0* 15.8* 16.0*    302 238   MPV 10.9 10.9 11.4     CRP 1.4  Sodium 147 /148 /146 today   chloride 112 /110  CO2 31  BUN 35  WBC 13.2 /14.1/23.1 /21.9  Hemoglobin 13  Blood glucose in the range of 150-223  Last   Lactic acid 2.9      Radiology:   XR CHEST PORTABLE   Final Result   1. There is no interval change in extensive bilateral airspace disease. XR CHEST PORTABLE   Final Result   No significant change since prior study      XR CHEST PORTABLE   Final Result   No change. XR CHEST 1 VIEW   Final Result   1. No interval change in the multifocal bilateral pulmonary infiltrates. XR CHEST PORTABLE   Final Result   Persistent bilateral ground-glass opacity infiltrates throughout both lungs   with fine underline reticular pattern. The infiltrates appears to be   somewhat increased since the previous examination of November 15. No   conspicuous pleural effusions observed. The heart is normal size. RECOMMENDATION:   Further increase in bilateral infiltrates since November 15. XR CHEST PORTABLE   Final Result   Support devices in good position. Persistent interstitial and airspace   opacities in both lungs. XR ABDOMEN FOR NG/OG/NE TUBE PLACEMENT   Final Result   Support devices in good position. Persistent interstitial and airspace   opacities in both lungs. CT CHEST WO CONTRAST   Final Result   Multifocal confluent and ground-glass airspace disease in the lungs   bilaterally most consistent with multifocal pneumonia. Emphysematous changes in the lungs with peripheral bulla. Nonspecific mediastinal nodes, which may be reactive. Limited evaluation without intravenous contrast.         CT ABDOMEN PELVIS WO CONTRAST Additional Contrast? None   Final Result   No evidence of acute intra-abdominal pathology. XR CHEST PORTABLE   Final Result   1. Significant bilateral multifocal pulmonary infiltrates             Assessment:    Active Problems:    Hypoxia  Resolved Problems:    * No resolved hospital problems. *      Plan:  1.  Acute hypoxic respiratory failure, requiring support with ventilator, patient remains in ICU, under care

## 2020-11-23 NOTE — PROCEDURES
Arterial Line Placement Procedure Note                     Indication: Need for serial blood work, arterial blood gases and mechanical ventilation    Consent: The family members were counseled regarding the procedure, its indications, risks, potential complications and alternatives, and any questions were answered. Consent was obtained to proceed. Terrell's Test: Normal    Procedure: The skin over the right femoral artery was prepped with betadine and draped in a sterile fashion. Local anesthesia was obtained by infiltration using 1% Lidocaine without epinephrine. A 20 gauge arterial line catheter was then inserted, using a modified Seldinger technique, into the vessel. The transducer set was then attached and securely fastened to the skin with sutures. Waveforms on the monitor were observed and found to be adequate. The patient had good distal perfusion after the procedure. The site was then dressed in a sterile fashion. The patient tolerated the procedure well. Complications: 2 attempts made in the left wrist without success. Attention was then made to the femoral line with 1 attempt.

## 2020-11-23 NOTE — PLAN OF CARE
Problem: Airway Clearance - Ineffective  Goal: Achieve or maintain patent airway  Outcome: Met This Shift     Problem: Breathing Pattern - Ineffective  Goal: Ability to achieve and maintain a regular respiratory rate  Outcome: Met This Shift     Problem:  Body Temperature -  Risk of, Imbalanced  Goal: Ability to maintain a body temperature within defined limits  Outcome: Met This Shift     Problem: Isolation Precautions - Risk of Spread of Infection  Goal: Prevent transmission of infection  Outcome: Met This Shift     Problem: Nutrition Deficits  Goal: Optimize nutrtional status  Outcome: Met This Shift     Problem: Risk for Fluid Volume Deficit  Goal: Maintain normal heart rhythm  Outcome: Met This Shift  Goal: Maintain absence of muscle cramping  Outcome: Met This Shift  Goal: Maintain normal serum potassium, sodium, calcium, phosphorus, and pH  Outcome: Met This Shift     Problem: Patient Education: Go to Patient Education Activity  Goal: Patient/Family Education  Outcome: Met This Shift     Problem: Falls - Risk of:  Goal: Will remain free from falls  Description: Will remain free from falls  Outcome: Met This Shift  Goal: Absence of physical injury  Description: Absence of physical injury  Outcome: Met This Shift     Problem: Pain:  Goal: Pain level will decrease  Description: Pain level will decrease  Outcome: Met This Shift  Goal: Control of acute pain  Description: Control of acute pain  Outcome: Met This Shift  Goal: Control of chronic pain  Description: Control of chronic pain  Outcome: Met This Shift     Problem: Skin Integrity:  Goal: Will show no infection signs and symptoms  Description: Will show no infection signs and symptoms  Outcome: Met This Shift  Goal: Absence of new skin breakdown  Description: Absence of new skin breakdown  Outcome: Met This Shift     Problem: Restraint Use - Nonviolent/Non-Self-Destructive Behavior:  Goal: Absence of restraint-related injury  Description: Absence of restraint-related injury  Outcome: Met This Shift     Problem: Gas Exchange - Impaired  Goal: Absence of hypoxia  Outcome: Not Met This Shift  Goal: Promote optimal lung function  Outcome: Not Met This Shift     Problem:  Body Temperature -  Risk of, Imbalanced  Goal: Will regain or maintain usual level of consciousness  Outcome: Not Met This Shift     Problem: Loneliness or Risk for Loneliness  Goal: Demonstrate positive use of time alone when socialization is not possible  Outcome: Not Met This Shift     Problem: Fatigue  Goal: Verbalize increase energy and improved vitality  Outcome: Not Met This Shift     Problem: Restraint Use - Nonviolent/Non-Self-Destructive Behavior:  Goal: Absence of restraint indications  Description: Absence of restraint indications  Outcome: Not Met This Shift

## 2020-11-23 NOTE — PROGRESS NOTES
Critical Care Team - Daily Progress Note         Date and time: 11/23/2020 4:52 PM  Patient's name:  Negar Latif Record Number: [id number]  Patient's account/billing number: [de-identified]  Patient's YOB: 1955  Age: 77 y.o. Date of Admission: 11/11/2020  2:12 PM  Length of stay during current admission: 12      Primary Care Physician: No primary care provider on file. ICU Attending Physician: Dr. Josefa Davis Status: Full Code    Reason for ICU admission: Acute hypoxic respiratory failure secondary to COVID-19 pneumonia      SUBJECTIVE:     BRIEF HISTORY: History is gathered from medical records, patient was in respiratory distress on BiPAP, emergently being intubated on arrival in the ICU.     Ghazal pearce 65 y.o. arrived to the ED 11/11 with fever, nausea, vomiting.  He reports the symptoms began 6 days prior. Loss of appetite red green-white productive sputum, urinary frequency, diarrhea rhinorrhea, nasal congestion chest pain right upper quadrant abdominal pain.  He had been on a Z-Michael from his primary physician no improvement in symptoms he complains of right sided pleuritic chest pain with cough.  ABG within normal range, leukocytosis, lymphopenia elevated D-dimer greater than 5250 he was started on heparin drip, ferritin 1710 and admitted await ID consult for treatment will need remdesivir, infectious work-up.  He arrived hypoxic was placed on 15 L nonrebreather he denies history of PEs or DVTs      Patient was admitted to the hospital, was on BiPAP, OptiFlow. On 11/15/2020, patient was unable to obtain O2 sat above upper 80s, was in respiratory distress with respiratory rate of 40-50/min. Because of his increasing respiratory distress on BiPAP, patient was transferred to ICU, intubated. He has been followed by pulmonology, infectious disease throughout hospital stay.      OVERNIGHT EVENTS:    11/16/20: Some soft BPs in the evening after being proned, Silas-Synephrine started. 11/17/20: Patient supine. Will attempt 6 hours and switch back to prone. 11/18/20: Patient prone overnight. Sit to supine this morning. Tolerated supine throughout the day. Put back to prone for 1 more night. 11/19/20: Patient switched to supine. Will repeat gas. Off of pressors. 11/20/20: Tolerated parenteral day. Became agitated overnight and propofol was started. .  11/21/20: Needing pressors overnight. O2 saturation good and less moving patient. With any movement or agitation, O2 saturation drops. Back on 3 sedatives overnight. leukocytosis, infectious work-up. 11/22/20: Agitated overnight. Attempt to stand up out of bed, he had feet on the ground. Nursing put him back in bed. He then calmed. Patient again became agitated with tachycardia and hypertension. He is going for Versed. Resting comfortably in bed. 11/23/20; still agitated overnight. Attempted to stand up again. Restarted back on paralytics.     CURRENT VENTILATION STATUS:     [x] Ventilator  [] BIPAP  [] Nasal Cannula [] Room Air        SECRETIONS : Amount:  [x] Small [] Moderate  [] Large  [] None  Color:     [] White [] Colored  [] Bloody    SEDATION:    [x] Propofol gtt  [] Versed gtt  [] Ativan gtt   [] No Sedation    PARALYZED:  [x] No    [] Yes      VASOPRESSORS:  [] No    [x] Yes    If yes -   [x] Levophed       [] Dopamine     [] Vasopressin       [] Dobutamine  [] Phenylephrine         [] Epinephrine    CENTRAL LINES:     [] No   [] Yes   (Date of Insertion:  11/15 switching to PICC today)           If yes -     [x] Right IJ     [] Left IJ [] Right Femoral [] Left Femoral                   [] Right Subclavian [] Left Subclavian       HANSON'S CATHETER:   [] No   [x] Yes  (Date of Insertion:  11/15 )     URINE OUTPUT:            [x] Good   [] Low              [] Anuric      OBJECTIVE:     VITAL SIGNS:  BP (!) 100/55   Pulse 99   Temp 98.4 °F (36.9 °C) (Bladder)   Resp 27   Ht 5' 8\" (1.727 m)   Wt 236 lb 12.4 oz (107.4 kg)   SpO2 95%   BMI 36.00 kg/m²   Tmax over 24 hours:  Temp (24hrs), Av.5 °F (36.9 °C), Min:97.5 °F (36.4 °C), Max:99.1 °F (37.3 °C)      Patient Vitals for the past 6 hrs:   Temp Temp src Pulse Resp SpO2   20 1615 -- -- 99 27 95 %   20 1600 98.4 °F (36.9 °C) Bladder 99 28 95 %   20 1500 -- -- 102 28 92 %   20 1400 -- -- 96 28 92 %   20 1300 -- -- 94 27 94 %   20 1200 97.5 °F (36.4 °C) Bladder 78 28 94 %   20 1100 -- -- 78 26 97 %         Intake/Output Summary (Last 24 hours) at 2020 1652  Last data filed at 2020 1500  Gross per 24 hour   Intake 6457 ml   Output 3415 ml   Net 3042 ml     Wt Readings from Last 2 Encounters:   20 236 lb 12.4 oz (107.4 kg)     Body mass index is 36 kg/m². PHYSICAL EXAMINATION:  General: Intubated, sedated, supine  Eyes: conjunctivae/corneas clear, sclera non icteric  Ears: no obvious scars, no lesions, no masses  Mouth: mucous membranes somewhat dry, no obvious oral sores.   ET tube in place  Head: normocephalic, atraumatic  Neck: no JVD, trachea midline  Lungs: Diminished bilaterally  Heart: tachycardic, regular rhythm, no murmur, normal S1, S2  Abdomen: soft, non-tender; bowel sounds normal; no masses, no organomegaly  Extremities: no lower extremity edema, extremities atraumatic, no cyanosis, no clubbing, 2+ pedal pulses palpated  Skin: normal color, normal texture, normal turgor, no rashes, no lesions  Neurologic: unable to assess   MEDICATIONS:    Scheduled Meds:   QUEtiapine  25 mg Oral BID    divalproex  500 mg Oral 2 times per day    vancomycin  1,000 mg Intravenous Q12H    insulin glargine  20 Units Subcutaneous Nightly    oxyCODONE  10 mg Oral Q6H    heparin flush  3 mL Intravenous 2 times per day    insulin lispro  0-18 Units Subcutaneous Q6H    midodrine  10 mg Oral TID WC    pantoprazole  40 mg Intravenous Daily    And    sodium chloride (PF)  10 mL Intravenous Daily    enoxaparin 110 mg Subcutaneous BID    chlorhexidine  15 mL Mouth/Throat BID    influenza virus vaccine  0.5 mL Intramuscular Prior to discharge    sodium chloride flush  10 mL Intravenous 2 times per day    vitamin C  1,000 mg Oral BID    zinc sulfate  50 mg Oral BID    vitamin D3  400 Units Oral Daily     Continuous Infusions:   cisatracurium (NIMBEX) infusion 2 mcg/kg/min (11/23/20 1341)    midazolam 10 mg/hr (11/23/20 0434)    fentaNYL 5 mcg/ml in 0.9%  ml infusion 200 mcg/hr (11/23/20 1300)    norepinephrine 10 mcg/kg/min (11/23/20 0645)    propofol 40 mcg/kg/min (11/23/20 1331)    dextrose       PRN Meds:   magnesium sulfate, 1 g, PRN  potassium chloride, 20 mEq, PRN  sodium phosphate IVPB, 0.16 mmol/kg, PRN    Or  sodium phosphate IVPB, 0.32 mmol/kg, PRN  sodium chloride flush, 10 mL, PRN  heparin flush, 3 mL, PRN  glucose, 15 g, PRN  dextrose, 12.5 g, PRN  glucagon (rDNA), 1 mg, PRN  dextrose, 100 mL/hr, PRN  sodium chloride, 30 mL, PRN  sodium chloride flush, 10 mL, PRN  acetaminophen, 650 mg, Q6H PRN    Or  acetaminophen, 650 mg, Q6H PRN  polyethylene glycol, 17 g, Daily PRN  promethazine, 12.5 mg, Q6H PRN    Or  ondansetron, 4 mg, Q6H PRN        VENT SETTINGS (Comprehensive) (if applicable):  Vent Information  $Ventilation: $Subsequent Day  Skin Assessment: Clean, dry, & intact  Equipment ID: [id number]  Equipment Changed: Airway securing device  Vent Type: [id number]  Vent Mode: AC/VC  Vt Ordered: 400 mL  Rate Set: 28 bmp  Peak Flow: 70 L/min  Pressure Support: 0 cmH20  FiO2 : 90 %  SpO2: 95 %  SpO2/FiO2 ratio: 105.56  Sensitivity: 3  PEEP/CPAP: 14  I Time/ I Time %: 0 s  Humidification Source: Heated wire  Humidification Temp: 37  Humidification Temp Measured: 37  Circuit Condensation: Drained  Mask Type: Full face mask  Mask Size: Medium  Additional Respiratory  Assessments  Pulse: 99  Resp: 27  SpO2: 95 %  Position: Semi-Barnett's  Humidification Source: Heated wire  Humidification Temp: Lasha Kramer Condensation: Drained  Oral Care: Mouth suctioned, Mouth swabbed, Mouth moisturizer  Subglottic Suction Done?: Yes  Cuff Pressure (cm H2O): 29 cm H2O    ABGs:   Recent Labs     11/23/20  1259   PH 7.276*   PCO2 65.6*   PO2 75.6   HCO3 29.9*   BE 1.5   O2SAT 92.6       Laboratory findings:    Complete Blood Count:   Recent Labs     11/21/20  0550 11/22/20  0515 11/23/20  0540   WBC 23.1* 21.9* 16.8*   HGB 14.1 13.6 12.0*   HCT 45.2 43.1 37.8    302 238        Last 3 Blood Glucose:   Recent Labs     11/21/20  0550 11/22/20  0515 11/23/20  0540   GLUCOSE 199* 187* 176*        PT/INR:    Lab Results   Component Value Date    PROTIME 10.9 02/16/2012    INR 0.9 02/16/2012     PTT:    Lab Results   Component Value Date    APTT 103.3 11/16/2020       Comprehensive Metabolic Profile:   Recent Labs     11/21/20  0550 11/22/20  0515 11/23/20  0540    143 140   K 4.0 4.2 3.8    103 100   CO2 32* 31* 30*   BUN 33* 32* 28*   CREATININE 1.0 0.9 0.9   GLUCOSE 199* 187* 176*   CALCIUM 8.8 9.0 8.4*   PROT 6.2* 6.1* 5.5*   LABALBU 3.0* 2.6* 2.7*   BILITOT 0.7 0.5 0.4   ALKPHOS 91 94 81   AST 43* 42* 32   ALT 53* 48* 40      Magnesium:   Lab Results   Component Value Date    MG 2.0 11/23/2020     Phosphorus:   Lab Results   Component Value Date    PHOS 4.0 11/23/2020     Ionized Calcium: No results found for: CAION       Troponin: No results for input(s): TROPONINI in the last 72 hours.     Microbiology:  Cultures during this admission:     Blood cultures:                 [] None drawn      [] Negative             [x]  Positive (Details: Coag negative staph)  Urine Culture:                   [x] None drawn      [] Negative             []  Positive (Details:  )  Sputum Culture:               [] None drawn       [] Negative             [x]  Positive (Details: repeat sent today 11/21 pending)  Endotracheal aspirate:     [] None drawn       [] Negative             [x]  Positive (Candida)  Other pertinent Labs: Strep and legionella ag negative    Radiology/Imaging:     Chest x-ray:  11/23/20     Impression    1. There is no interval change in extensive bilateral airspace disease. ASSESSMENT:     Active Problems:    Hypoxia  Resolved Problems:    * No resolved hospital problems. *      Additional assessment:    COVID-19 pneumonia  Acute Respiratory failure secondary to COVID-19 pneumonia  Community acquired bacterial pneumonia  Hypotension -resolved  hyperglycemia        PLAN:     WEAN PER PROTOCOL:  [] No   [x] Yes  [] N/A    DISCONTINUE ANY LABS:   [x] No   [] Yes    ICU PROPHYLAXIS:  Stress ulcer:  [x] PPI Agent  [] Q4Bdlax [] Sucralfate  [] Other:  VTE:  [x] Enoxaparin  [] Unfract. Heparin Subcut  [] EPC Cuffs    NUTRITION:  [] NPO [] Tube Feeding (Specify: ) [] TPN  [x] PO (Diet: DIET TUBE FEED CONTINUOUS/CYCLIC NPO; Semi-elemental; Orogastric; Continuous; 20; 65  Diet Tube Feed Modular: Protein Modular)    HOME MEDICATIONS RECONCILED: [] No  [] Yes    INSULIN DRIP:   [x] No   [] Yes    CONSULTATION NEEDED:  [x] No   [] Yes    FAMILY UPDATED:    [] No   [x] Yes    TRANSFER OUT OF ICU:   [x] No   [] Yes    ADDITIONAL PLAN:  SYSTEMS ASSESSMENT     Neuro   Intubated, sedated, paralyzed   -Increased agitation. Continue fentanyl, Versed, propofol. Add Depakote. - since hypoxia, restart paralytic   -triglyceride level     Respiratory   Acute hypoxic respiratory failure secondary to COVID-19 pneumonia  -Was initially on BiPAP, was intubated 11/15  -ID following  -levofloxacin--finished for presumed community-acquired pneumonia superimposed on COVID-19 PNA  -Patient tolerating supine. Wean FiO2 and PEEP as able. Keep plateau pressures less than 30.  -Repeat respiratory culture + candida  -Repeat gas at 8 PM    Superimposed PNA-completed 7 days of Levaquin on 11/18     Cardiovascular   Hypotension--shock? Dehydration?   -On midodrine. Monitor pressures.   -Levo as needed for MAP greater than 65.  intermittently made as appropriate. I agree with the above documented exam, problem list and plan of care. Abx   Echo   Full vent support   ards protocol, vent adjusted   Rozina Toney M.D.    Pulmonary/Critical Care Medicine   50 min cct excluding procedures

## 2020-11-24 NOTE — PATIENT CARE CONFERENCE
Intensive Care Daily Quality Rounding Checklist        ICU Team Members: Dr. Sachin Aranda, charge nurse, bedside nurse, respiratory therapist     ICU Day #: NUMBER: 10     Intubation Date: November 15     Ventilator Day #: NUMBER: 10     Central Line Insertion Date: PICC Nanetteever SCHNEIDER #: NUMBER: 3      Arterial Line Insertion Date: Pepito Dellbarbara BARILLAS #: NUMBER: 10     DVT Prophylaxis: Lovenox     GI Prophylaxis: Protonix      Martinez Catheter Insertion Lafayette Dart                                        Day #: 10                             Continued need (if yes, reason documented and discussed with physician): yes, strict I/O, critically ill pt      Skin Issues/ Wounds and ordered treatment discussed on rounds: SOS precautions      Goals/ Plans for the Day:wean vent, monitor and replace e-, wean pressors as tolerated

## 2020-11-24 NOTE — PROGRESS NOTES
[address] [place] [address] [facility]  Progress Note    SUBJECTIVE:  Chief Complaint   Patient presents with    Shortness of Breath     76% room air at arrival     Fever    Emesis     The patient is still in the ICU. He is still intubated, sedated and paralyzed. Review of systems:  As stated above in the chief complaint, otherwise negative.     Medications:  Scheduled Meds:   QUEtiapine  25 mg Oral BID    divalproex  500 mg Oral 2 times per day    vancomycin  1,000 mg Intravenous Q12H    insulin glargine  20 Units Subcutaneous Nightly    oxyCODONE  10 mg Oral Q6H    heparin flush  3 mL Intravenous 2 times per day    insulin lispro  0-18 Units Subcutaneous Q6H    midodrine  10 mg Oral TID WC    pantoprazole  40 mg Intravenous Daily    And    sodium chloride (PF)  10 mL Intravenous Daily    enoxaparin  110 mg Subcutaneous BID    chlorhexidine  15 mL Mouth/Throat BID    influenza virus vaccine  0.5 mL Intramuscular Prior to discharge    sodium chloride flush  10 mL Intravenous 2 times per day    vitamin C  1,000 mg Oral BID    zinc sulfate  50 mg Oral BID    vitamin D3  400 Units Oral Daily     Continuous Infusions:   cisatracurium (NIMBEX) infusion 2.5 mcg/kg/min (11/24/20 0241)    midazolam 10 mg/hr (11/24/20 0312)    fentaNYL 5 mcg/ml in 0.9%  ml infusion 200 mcg/hr (11/24/20 0340)    norepinephrine 6 mcg/min (11/23/20 1847)    propofol 40 mcg/kg/min (11/24/20 0422)    dextrose       PRN Meds:magnesium sulfate, potassium chloride, sodium phosphate IVPB **OR** sodium phosphate IVPB, sodium chloride flush, heparin flush, glucose, dextrose, glucagon (rDNA), dextrose, sodium chloride, sodium chloride flush, acetaminophen **OR** acetaminophen, polyethylene glycol, promethazine **OR** ondansetron    OBJECTIVE:  /65   Pulse 103   Temp 98.5 °F (36.9 °C) (Bladder)   Resp 29   Ht 5' 8\" (1.727 m)   Wt 236 lb 12.4 oz (107.4 kg)   SpO2 94%   BMI 36.00 kg/m²   Temp Av.4 °F (36.9 °C)  Min: 97.5 °F (36.4 °C)  Max: 99 °F (37.2 °C)  Constitutional: The patient is lying in bed. He is intubated, sedated and paralyzed. He is supinated. FiO2 down to 80%. PEEP14. No changes. Skin: Dry. No rashes were noted. HEENT: Round pupils. No jaundice. ET tube. OG tube. Neck: Supple to movements. Chest: Good breath sounds bilaterally. No crackles were heard. Cardiovascular: Heart sounds rhythmic and regular. Abdomen: Diminished bowel sounds. Soft to palpation. Extremities: No edema. Lines: Right PICC 2020. Right femoral arterial line 2020.     Laboratory and Tests Review:  Lab Results   Component Value Date    WBC 10.1 2020    WBC 16.8 (H) 2020    WBC 21.9 (H) 2020    HGB 12.1 (L) 2020    HCT 39.7 2020    MCV 92.8 2020     2020     Lab Results   Component Value Date    NEUTROABS 9.29 (H) 2020    NEUTROABS 13.44 (H) 2020    NEUTROABS 16.64 (H) 2020     No results found for: [facility]  Lab Results   Component Value Date    ALT 42 (H) 2020    AST 31 2020    ALKPHOS 82 2020    BILITOT 0.5 2020     Lab Results   Component Value Date     2020    K 4.6 2020    K 3.7 11/15/2020     2020    CO2 33 2020    BUN 19 2020    CREATININE 0.8 2020    CREATININE 0.9 2020    CREATININE 0.9 2020    GFRAA >60 2020    LABGLOM >60 2020    GLUCOSE 160 2020    GLUCOSE 154 2012    PROT 5.6 2020    LABALBU 2.6 2020    CALCIUM 8.7 2020    BILITOT 0.5 2020    ALKPHOS 82 2020    AST 31 2020    ALT 42 2020     Lab Results   Component Value Date    CRP 0.4 2020    CRP 0.5 (H) 2020    CRP 1.4 (H) 2020     Lab Results   Component Value Date    SEDRATE 80 (H) 2020     Radiology:  Chest x-ray shows bilateral interstitial infiltrates    Microbiology: Streptococcus pneumoniae/Legionella urine Ag: Negative  Blood cultures 11/11/2020: Negative x2  Blood cultures 11/21/2020: CoNS in 1 of 2 sets  Of blood cultures 1/22/2020: Negative so far  Respiratory culture 11/17/2020: OP nikita reduced  Respiratory culture 11/21/2020: Candida albicans  Nares screen MRSA: Pending  Arterial line tip culture 11/23/2020: Pending    No results for input(s): PROCAL in the last 72 hours. ASSESSMENT:  · Moderate to severe SARS-CoV-2 infection with pneumonia. Completed 5 days of Remdesivir 11/16/2020  · Acute respiratory failure. Ventilator dependent  · Lymphopenia secondary to SARS-CoV-2 infection  · CoNS bacteremia. Probable CLABSI versus contaminant. TLC was removed but not cultured. A-line could not be changed  · Low-grade fevers associated to possible CLABSI  · Leukocytosis associated to the above, improving  · Probable community-acquired pneumonia. Completed 7 days of Levofloxacin 11/18/2020   · Airway colonization with Candida  · Elevation of transaminases associated to the above, improved    PLAN:  · Continue Vancomycin.   Trough was 17.6 yesterday  · Consider weaning dexamethasone  · Anticoagulation  · At this point no need to treat Candida colonization of airways  · Arterial should be removed and tip cultured  · Prognosis is poor  · Patient will need tracheostomy and PEG    Edilma Bridges  8:54 AM  11/24/2020

## 2020-11-24 NOTE — PROGRESS NOTES
Critical Care Team - Daily Progress Note         Date and time: 11/24/2020 7:29 PM  Patient's name:  Esvin Bryant Record Number: [id number]  Patient's account/billing number: [de-identified]  Patient's YOB: 1955  Age: 77 y.o. Date of Admission: 11/11/2020  2:12 PM  Length of stay during current admission: 13      Primary Care Physician: No primary care provider on file. ICU Attending Physician: Dr. Katherleen Skiff Status: Full Code    Reason for ICU admission: Acute hypoxic respiratory failure secondary to COVID-19 pneumonia      SUBJECTIVE:     BRIEF HISTORY: History is gathered from medical records, patient was in respiratory distress on BiPAP, emergently being intubated on arrival in the ICU.     Lalo pearce 65 y.o. arrived to the ED 11/11 with fever, nausea, vomiting.  He reports the symptoms began 6 days prior. Loss of appetite red green-white productive sputum, urinary frequency, diarrhea rhinorrhea, nasal congestion chest pain right upper quadrant abdominal pain.  He had been on a Z-Michael from his primary physician no improvement in symptoms he complains of right sided pleuritic chest pain with cough.  ABG within normal range, leukocytosis, lymphopenia elevated D-dimer greater than 5250 he was started on heparin drip, ferritin 1710 and admitted await ID consult for treatment will need remdesivir, infectious work-up.  He arrived hypoxic was placed on 15 L nonrebreather he denies history of PEs or DVTs      Patient was admitted to the hospital, was on BiPAP, OptiFlow. On 11/15/2020, patient was unable to obtain O2 sat above upper 80s, was in respiratory distress with respiratory rate of 40-50/min. Because of his increasing respiratory distress on BiPAP, patient was transferred to ICU, intubated. He has been followed by pulmonology, infectious disease throughout hospital stay.      OVERNIGHT EVENTS:    11/16/20: Some soft BPs in the evening after being proned, Silas-Synephrine started. 11/17/20: Patient supine. Will attempt 6 hours and switch back to prone. 11/18/20: Patient prone overnight. Sit to supine this morning. Tolerated supine throughout the day. Put back to prone for 1 more night. 11/19/20: Patient switched to supine. Will repeat gas. Off of pressors. 11/20/20: Tolerated parenteral day. Became agitated overnight and propofol was started. .  11/21/20: Needing pressors overnight. O2 saturation good and less moving patient. With any movement or agitation, O2 saturation drops. Back on 3 sedatives overnight. leukocytosis, infectious work-up. 11/22/20: Agitated overnight. Attempt to stand up out of bed, he had feet on the ground. Nursing put him back in bed. He then calmed. Patient again became agitated with tachycardia and hypertension. He is going for Versed. Resting comfortably in bed. 11/23/20; still agitated overnight. Attempted to stand up again. Restarted back on paralytics. 11/24/20:  Wean fio2 and vent as able.  Off pressors    CURRENT VENTILATION STATUS:     [x] Ventilator  [] BIPAP  [] Nasal Cannula [] Room Air        SECRETIONS : Amount:  [x] Small [] Moderate  [] Large  [] None  Color:     [] White [] Colored  [] Bloody    SEDATION:    [x] Propofol gtt  [] Versed gtt  [] Ativan gtt   [] No Sedation    PARALYZED:  [x] No    [] Yes      VASOPRESSORS:  [x] No    [] Yes    If yes -   [] Levophed       [] Dopamine     [] Vasopressin       [] Dobutamine  [] Phenylephrine         [] Epinephrine    CENTRAL LINES:     [] No   [] Yes   (Date of Insertion:  11/15 switching to PICC today)           If yes -     [x] Right IJ     [] Left IJ [] Right Femoral [] Left Femoral                   [] Right Subclavian [] Left Subclavian       HANSON'S CATHETER:   [] No   [x] Yes  (Date of Insertion:  11/15 )     URINE OUTPUT:            [x] Good   [] Low              [] Anuric      OBJECTIVE:     VITAL SIGNS:  /65   Pulse 107   Temp 98.8 °F (37.1 °C) (Bladder) Resp 29   Ht 5' 8\" (1.727 m)   Wt 236 lb 12.4 oz (107.4 kg)   SpO2 100%   BMI 36.00 kg/m²   Tmax over 24 hours:  Temp (24hrs), Av.9 °F (37.2 °C), Min:98.5 °F (36.9 °C), Max:99.4 °F (37.4 °C)      Patient Vitals for the past 6 hrs:   Pulse Resp SpO2   20 1400 107 29 100 %   20 1359 107 29 100 %         Intake/Output Summary (Last 24 hours) at 2020 1929  Last data filed at 2020 0500  Gross per 24 hour   Intake 3665 ml   Output 3100 ml   Net 565 ml     Wt Readings from Last 2 Encounters:   20 236 lb 12.4 oz (107.4 kg)     Body mass index is 36 kg/m². PHYSICAL EXAMINATION:  General: Intubated, sedated, supine  Eyes: conjunctivae/corneas clear, sclera non icteric  Ears: no obvious scars, no lesions, no masses  Mouth: mucous membranes somewhat dry, no obvious oral sores.   ET tube in place  Head: normocephalic, atraumatic  Neck: no JVD, trachea midline  Lungs: Diminished bilaterally  Heart: tachycardic, regular rhythm, no murmur, normal S1, S2  Abdomen: soft, non-tender; bowel sounds normal; no masses, no organomegaly  Extremities: 1+ extremity edema, extremities atraumatic, no cyanosis, no clubbing, 2+ pedal pulses palpated  Skin: normal color, normal texture, normal turgor, no rashes, no lesions  Neurologic: unable to assess   MEDICATIONS:    Scheduled Meds:   QUEtiapine  25 mg Oral BID    divalproex  500 mg Oral 2 times per day    vancomycin  1,000 mg Intravenous Q12H    insulin glargine  20 Units Subcutaneous Nightly    oxyCODONE  10 mg Oral Q6H    heparin flush  3 mL Intravenous 2 times per day    insulin lispro  0-18 Units Subcutaneous Q6H    midodrine  10 mg Oral TID WC    pantoprazole  40 mg Intravenous Daily    And    sodium chloride (PF)  10 mL Intravenous Daily    enoxaparin  110 mg Subcutaneous BID    chlorhexidine  15 mL Mouth/Throat BID    influenza virus vaccine  0.5 mL Intramuscular Prior to discharge    sodium chloride flush  10 mL Intravenous 2 11/23/20 2049 11/24/20 0541   PH 7.227*  --    PCO2 71.9*  --    PO2 68.2*  --    HCO3 29.2*  --    BE -0.2 3.0*   O2SAT 90.2* 92.0       Laboratory findings:    Complete Blood Count:   Recent Labs     11/22/20 0515 11/23/20 0540 11/24/20 0520   WBC 21.9* 16.8* 10.1   HGB 13.6 12.0* 12.1*   HCT 43.1 37.8 39.7    238 189        Last 3 Blood Glucose:   Recent Labs     11/22/20 0515 11/23/20 0540 11/24/20 0520   GLUCOSE 187* 176* 160*        PT/INR:    Lab Results   Component Value Date    PROTIME 10.9 02/16/2012    INR 0.9 02/16/2012     PTT:    Lab Results   Component Value Date    APTT 103.3 11/16/2020       Comprehensive Metabolic Profile:   Recent Labs     11/22/20 0515 11/23/20 0540 11/24/20 0520    140 139   K 4.2 3.8 4.6    100 101   CO2 31* 30* 33*   BUN 32* 28* 19   CREATININE 0.9 0.9 0.8   GLUCOSE 187* 176* 160*   CALCIUM 9.0 8.4* 8.7   PROT 6.1* 5.5* 5.6*   LABALBU 2.6* 2.7* 2.6*   BILITOT 0.5 0.4 0.5   ALKPHOS 94 81 82   AST 42* 32 31   ALT 48* 40 42*      Magnesium:   Lab Results   Component Value Date    MG 2.1 11/24/2020     Phosphorus:   Lab Results   Component Value Date    PHOS 4.6 11/24/2020     Ionized Calcium: No results found for: CAION       Troponin: No results for input(s): TROPONINI in the last 72 hours. Microbiology:  Cultures during this admission:     Blood cultures:                 [] None drawn      [] Negative             [x]  Positive (Details: Coag negative staph)  Urine Culture:                   [x] None drawn      [] Negative             []  Positive (Details:  )  Sputum Culture:               [] None drawn       [] Negative             [x]  Positive (Details: repeat sent today 11/21 pending)  Endotracheal aspirate:     [] None drawn       [] Negative             [x]  Positive (Candida)  Other pertinent Labs: Strep and legionella ag negative    Radiology/Imaging:     Chest x-ray:  11/23/20     Impression    1.  There is no interval change in extensive bilateral airspace disease. ASSESSMENT:     Active Problems:    Hypoxia    COVID-19    Hypoxemia    Palliative care by specialist    Counseling regarding advance care planning and goals of care  Resolved Problems:    * No resolved hospital problems. *      Additional assessment:    COVID-19 pneumonia  Acute Respiratory failure secondary to COVID-19 pneumonia  Community acquired bacterial pneumonia  Hypotension -resolved  hyperglycemia        PLAN:     WEAN PER PROTOCOL:  [] No   [x] Yes  [] N/A    DISCONTINUE ANY LABS:   [x] No   [] Yes    ICU PROPHYLAXIS:  Stress ulcer:  [x] PPI Agent  [] [id number] [] Sucralfate  [] Other:  VTE:  [x] Enoxaparin  [] Unfract. Heparin Subcut  [] EPC Cuffs    NUTRITION:  [] NPO [] Tube Feeding (Specify: ) [] TPN  [x] PO (Diet: DIET TUBE FEED CONTINUOUS/CYCLIC NPO; Semi-elemental; Orogastric; Continuous; 20; 65  Diet Tube Feed Modular: Protein Modular)    HOME MEDICATIONS RECONCILED: [] No  [] Yes    INSULIN DRIP:   [x] No   [] Yes    CONSULTATION NEEDED:  [x] No   [] Yes    FAMILY UPDATED:    [] No   [x] Yes    TRANSFER OUT OF ICU:   [x] No   [] Yes    ADDITIONAL PLAN:  SYSTEMS ASSESSMENT     Neuro   Intubated, sedated, paralyzed   -Increased agitation. Continue fentanyl, Versed, propofol. Add Depakote. And wean as able   - since hypoxia, restarted paralytic   -triglyceride level-- okay     Respiratory   Acute hypoxic respiratory failure secondary to COVID-19 pneumonia  -Was initially on BiPAP, was intubated 11/15  -ID following  -levofloxacin--finished for presumed community-acquired pneumonia superimposed on COVID-19 PNA  -Patient tolerating supine. Wean FiO2 and PEEP as able. Keep plateau pressures less than 30.  -Repeat respiratory culture + candida  -wean fio2    Superimposed PNA-completed 7 days of Levaquin on 11/18     Cardiovascular   Hypotension--resolved   -On midodrine. Monitor pressures.   -Levo as needed for MAP greater than 65.  intermittently need  Prolonged qtc     Gastrointestinal   Tube feeds     Renal   Hypernatremia-resolved  Holding Lasix     Infectious Disease   COVID-19 pneumonia   -On Decadron--finished. remdesivir-finished   -ARDS protocol   -wean as able    Superimposed community-acquired pneumonia,   - followed by ID, finish 7 days of Levaquin    Leukocytosis   -Repeat blood culture + gram-positive cocci   -Respiratory culture + Candida   -PICC    Gram + cocci bacteremia   -vacnco   -repeat cx   -Remove and replace art line   -LAKIA    Repeat cultures still pending     Hematology/Oncology   Hypercoagulability secondary to Covid pneumonia   -Dimer now down trending, decrease lovenox to prophylactic dose? ?     Endocrine   Hyperglycemia related to steroids -high-dose sliding scale insulin, Lantus 10 units daily. --Better controlled     Social/Spiritual/DNR/Other   Full code  Palliative care consult      Diet: DIET TUBE FEED CONTINUOUS/CYCLIC NPO; Semi-elemental; Orogastric; Continuous; 20; 65  Diet Tube Feed Modular: Protein Modular  CODE Status: Full Code    Dispo: Maintain ICU level of care    1. As above  2. Wean FiO2 and PEEP as able  3. Palliative care consult  4. GI prophylaxis - protonix  5. DVT Prophylaxis - lovenox BID  6. Discuss case and plan with attending, Dr. Alysha Garzon, DO  Resident, PGY-2  11/24/2020  7:29 PM     I personally saw, examined and provided care for the patient. Radiographs, labs and medication list were reviewed by me independently. I spoke with bedside nursing, therapists and consultants. Critical care services and times documented are independent of procedures and multidisciplinary rounds with Residents. Additionally comprehensive, multidisciplinary rounds were conducted with the MICU team. The case was discussed in detail and plans for care were established. Review of Residents documentation was conducted and revisions were made as appropriate.  I agree with the above documented exam, problem

## 2020-11-24 NOTE — CARE COORDINATION
COVID POSITIVE 11/11. Vent/ intubated since 11/15-FIO2 80%, PEEP 14- requiring iv pressor, on paralytic. Was independent at home w/ wife PTA.  [facility] door referral. Will follow [facility]

## 2020-11-24 NOTE — PROGRESS NOTES
Diagnosis:   · Inadequate oral intake related to impaired respiratory function(2/2 COVID-19 PNA) as evidenced by NPO or clear liquid status due to medical condition, intubation      Nutrition Interventions:   Food and/or Nutrient Delivery:  Continue Current Tube Feeding  Nutrition Education/Counseling:  Education not indicated   Coordination of Nutrition Care:  Continue to monitor while inpatient    Goals:  Pt jb EN at goal rate       Nutrition Monitoring and Evaluation:   Food/Nutrient Intake Outcomes:  Enteral Nutrition Intake/Tolerance  Physical Signs/Symptoms Outcomes:  Biochemical Data, GI Status, Fluid Status or Edema, Hemodynamic Status, Weight, Skin, Nutrition Focused Physical Findings     Discharge Planning:     Too soon to determine     Electronically signed by Bonnie Santillan RD, CNSC, LD on 11/24/20 at 11:19 AM EST    Contact: [phone]

## 2020-11-24 NOTE — CONSULTS
paralysis and sedation. He is now tolerating change position however having significant agitation at night. Today is ICU day #10 and ventilator day #10. Chart reviewed discussed with bedside RN and ICU team.    Discussion held over telephone due to current visitor restriction secondary to [address] pandemic    Details of Conversation:  Awaiting return call    Past Medical History:   Diagnosis Date    Palpitations        Past Surgical History:   Procedure Laterality Date    PICC LINE INSERTION NURSE  11/21/2020          Inpatient medications reviewed: yes  Home Medications reviewed: yes    No Known Allergies    History reviewed. No pertinent family history. Social history:  [place] status: yes  Marital status:   Living status: with family:  spouse   Work history: unknown  Tobacco use: history  Drug use: no  Alcohol use: no    Review of Systems:  Unable to obtained due to current condition/level of consciousness of patient    OBJECTIVE:   According to institutional recommendations and guidelines, a face-to-face encounter was not performed due to the current efforts to prevent transmission of COVID-19 and the need to preserve PPE for other caregivers. Relevant records, nursing assessment, and diagnostic testing including laboratory results and imaging were reviewed. Please reference any relevant documentation elsewhere. Patient was observed through the window and observation as reported below. Care will be coordinated with the primary services and consultants.     Prognosis: unknown    Physical Exam:  /65   Pulse 107   Temp 98.8 °F (37.1 °C) (Bladder)   Resp 29   Ht 5' 8\" (1.727 m)   Wt 236 lb 12.4 oz (107.4 kg)   SpO2 100%   BMI 36.00 kg/m²   Gen: sedated and intubated,paralyzed  HEENT: endotracheal tube and OG   Lungs: respirations nonlabored and mechanical ventilation   Heart: regular rate and rhythm per monitor  Abdomen: last BM 11/14  : henry catheter   Skin: no wounds documented per nursing assessment  Neuro: Paralyzed and sedated    Objective data reviewed: labs, images, records, medication use, vitals and chart  Relevant data: per HPI    Time/Communication  Greater than 50% of time spent, total 30 minutes in counseling and coordination of care at the bedside/over the telephone regarding see above. Thank you for allowing Palliative Medicine to participate in the care of Marleny Farris. Provider Doc Billingsley PA-C  Palliative Medicine    Note: This report was completed using computerize voiced recognition software. Every effort has been made to ensure accuracy; however, inadvertent computerized transcription errors may be present.

## 2020-11-24 NOTE — PLAN OF CARE
breakdown  Description: Absence of new skin breakdown  Outcome: Met This Shift     Problem:  Body Temperature -  Risk of, Imbalanced  Goal: Will regain or maintain usual level of consciousness  Outcome: Not Met This Shift     Problem: Loneliness or Risk for Loneliness  Goal: Demonstrate positive use of time alone when socialization is not possible  Outcome: Not Met This Shift     Problem: Fatigue  Goal: Verbalize increase energy and improved vitality  Outcome: Not Met This Shift

## 2020-11-24 NOTE — PROGRESS NOTES
[facility] Progress Note    Admitting Date and Time: 11/11/2020  2:12 PM  Admit Dx: Hypoxia [R09.02]  Hypoxia [R09.02]    Subjective:  Patient is being followed for Hypoxia [R09.02]  Hypoxia [R09.02]   Pt is intubated and paralyzed,   Still hypoxic, off pressors. ROS: denies fever, chills, cp, n/v, HA unless stated above.       QUEtiapine  25 mg Oral BID    divalproex  500 mg Oral 2 times per day    vancomycin  1,000 mg Intravenous Q12H    insulin glargine  20 Units Subcutaneous Nightly    oxyCODONE  10 mg Oral Q6H    heparin flush  3 mL Intravenous 2 times per day    insulin lispro  0-18 Units Subcutaneous Q6H    midodrine  10 mg Oral TID WC    pantoprazole  40 mg Intravenous Daily    And    sodium chloride (PF)  10 mL Intravenous Daily    enoxaparin  110 mg Subcutaneous BID    chlorhexidine  15 mL Mouth/Throat BID    influenza virus vaccine  0.5 mL Intramuscular Prior to discharge    sodium chloride flush  10 mL Intravenous 2 times per day    vitamin C  1,000 mg Oral BID    zinc sulfate  50 mg Oral BID    vitamin D3  400 Units Oral Daily     magnesium sulfate, 1 g, PRN  potassium chloride, 20 mEq, PRN  sodium phosphate IVPB, 0.16 mmol/kg, PRN    Or  sodium phosphate IVPB, 0.32 mmol/kg, PRN  sodium chloride flush, 10 mL, PRN  heparin flush, 3 mL, PRN  glucose, 15 g, PRN  dextrose, 12.5 g, PRN  glucagon (rDNA), 1 mg, PRN  dextrose, 100 mL/hr, PRN  sodium chloride, 30 mL, PRN  sodium chloride flush, 10 mL, PRN  acetaminophen, 650 mg, Q6H PRN    Or  acetaminophen, 650 mg, Q6H PRN  polyethylene glycol, 17 g, Daily PRN  promethazine, 12.5 mg, Q6H PRN    Or  ondansetron, 4 mg, Q6H PRN         Objective:    /65   Pulse 114   Temp 99.4 °F (37.4 °C) (Bladder)   Resp 29   Ht 5' 8\" (1.727 m)   Wt 236 lb 12.4 oz (107.4 kg)   SpO2 93%   BMI 36.00 kg/m²     General Appearance: intubated and paralyzed, pronated  Skin: warm and dry  Head: normocephalic and atraumatic  Eyes: pupils equal, round, and reactive to light, extraocular eye movements intact, conjunctivae normal  Neck: neck supple and non tender without mass   Pulmonary/Chest: diminished  bilaterally- no wheezes, rales or rhonchi, normal air movement  Cardiovascular: normal rate, prontaed  Extremities: no cyanosis, no clubbing and no edema  Neurologic: intubated and paralyzed        Recent Labs     11/22/20 0515 11/23/20  0540 11/24/20  0520    140 139   K 4.2 3.8 4.6    100 101   CO2 31* 30* 33*   BUN 32* 28* 19   CREATININE 0.9 0.9 0.8   GLUCOSE 187* 176* 160*   CALCIUM 9.0 8.4* 8.7       Recent Labs     11/22/20 0515 11/23/20 0540 11/24/20  0520   WBC 21.9* 16.8* 10.1   RBC 4.78 4.21 4.28   HGB 13.6 12.0* 12.1*   HCT 43.1 37.8 39.7   MCV 90.2 89.8 92.8   MCH 28.5 28.5 28.3   MCHC 31.6* 31.7* 30.5*   RDW 15.8* 16.0* 16.0*    238 189   MPV 10.9 11.4 11.3         Assessment:    Active Problems:    Hypoxia  Resolved Problems:    * No resolved hospital problems. *      Plan:  1. Acute hypoxic respiratory failure due to COVID-19 pneumonia, presented with oxygen saturation 76% on room air, intubated and paralyzed, on pressors, currently on 70% FiO2 with O2 sat 92%,  in supine position  2. Acute COVID-19 pneumonia, start the patient on Decadron, ID was consulted for remdesivir. Patient might be in cytokine storm,   3. Superimposed bacterial pneumonia finished 7 days of Levaquin. 4.  CLABSI, central line removed and art line was changed, started per ID on IV vanco.  5.  Suspecting cytokine storm with elevated D-dimer, started full dose anticoagulation. 6.  Acute renal failure, presented with creatinine of 2.4 not sure about the patient's baseline he might have CKD. Patient received IV fluids, resolved, cr at 0.8  7. Elevated transaminases, most likely due to COVID-19 infection and possible cytokine storm, trending down, continue to monitor.   8. Septic shock developed during the admission, due to COVID 19 pneumonia, on pressors, still on steroids. NOTE: This report was transcribed using voice recognition software. Every effort was made to ensure accuracy; however, inadvertent computerized transcription errors may be present.   Electronically signed by Chaz Danielson MD on 11/24/2020 at 11:01 AM

## 2020-11-25 NOTE — PROGRESS NOTES
Critical Care Team - Daily Progress Note         Date and time: 11/25/2020 3:52 PM  Patient's name:  Lamine Sabillon Record Number: [id number]  Patient's account/billing number: [de-identified]  Patient's YOB: 1955  Age: 77 y.o. Date of Admission: 11/11/2020  2:12 PM  Length of stay during current admission: 14      Primary Care Physician: No primary care provider on file. ICU Attending Physician: Dr. Olimpia Weber Status: Full Code    Reason for ICU admission: Acute hypoxic respiratory failure secondary to COVID-19 pneumonia      SUBJECTIVE:     BRIEF HISTORY: History is gathered from medical records, patient was in respiratory distress on BiPAP, emergently being intubated on arrival in the ICU.     Ion pearce 65 y.o. arrived to the ED 11/11 with fever, nausea, vomiting.  He reports the symptoms began 6 days prior. Loss of appetite red green-white productive sputum, urinary frequency, diarrhea rhinorrhea, nasal congestion chest pain right upper quadrant abdominal pain.  He had been on a Z-Michael from his primary physician no improvement in symptoms he complains of right sided pleuritic chest pain with cough.  ABG within normal range, leukocytosis, lymphopenia elevated D-dimer greater than 5250 he was started on heparin drip, ferritin 1710 and admitted await ID consult for treatment will need remdesivir, infectious work-up.  He arrived hypoxic was placed on 15 L nonrebreather he denies history of PEs or DVTs      Patient was admitted to the hospital, was on BiPAP, OptiFlow. On 11/15/2020, patient was unable to obtain O2 sat above upper 80s, was in respiratory distress with respiratory rate of 40-50/min. Because of his increasing respiratory distress on BiPAP, patient was transferred to ICU, intubated. He has been followed by pulmonology, infectious disease throughout hospital stay.      OVERNIGHT EVENTS:    11/16/20: Some soft BPs in the evening after being proned, Silas-Synephrine Anuric      OBJECTIVE:     VITAL SIGNS:  BP (!) 114/54   Pulse 77   Temp 99.4 °F (37.4 °C)   Resp 30   Ht 5' 8\" (1.727 m)   Wt 239 lb 10.2 oz (108.7 kg)   SpO2 94%   BMI 36.44 kg/m²   Tmax over 24 hours:  Temp (24hrs), Av.8 °F (37.7 °C), Min:98.4 °F (36.9 °C), Max:101.7 °F (38.7 °C)      Patient Vitals for the past 6 hrs:   Temp Temp src Pulse Resp SpO2 Weight   20 1500 -- -- 77 30 94 % --   20 1454 99.4 °F (37.4 °C) -- 83 30 -- --   20 1400 -- -- 84 28 94 % --   20 1300 -- -- 88 30 95 % --   20 1200 98.4 °F (36.9 °C) Bladder 93 29 95 % 239 lb 10.2 oz (108.7 kg)   20 1100 -- -- 96 30 94 % --   20 1000 -- -- 96 29 95 % --         Intake/Output Summary (Last 24 hours) at 2020 1552  Last data filed at 2020 1348  Gross per 24 hour   Intake 7898 ml   Output 74121 ml   Net -3052 ml     Wt Readings from Last 2 Encounters:   20 239 lb 10.2 oz (108.7 kg)     Body mass index is 36.44 kg/m². PHYSICAL EXAMINATION:  General: Intubated, sedated, supine  Eyes: conjunctivae/corneas clear, sclera non icteric  Ears: no obvious scars, no lesions, no masses  Mouth: mucous membranes somewhat dry, no obvious oral sores.   ET tube in place  Head: normocephalic, atraumatic  Neck: no JVD, trachea midline  Lungs: Diminished bilaterally  Heart: tachycardic, regular rhythm, no murmur, normal S1, S2  Abdomen: soft, non-tender; bowel sounds normal; no masses, no organomegaly  Extremities: 1-2+ extremity edema, extremities atraumatic, no cyanosis, no clubbing, 2+ pedal pulses palpated  Skin: normal color, normal texture, normal turgor, no rashes, no lesions  Neurologic: unable to assess   MEDICATIONS:    Scheduled Meds:   fluconazole  400 mg Intravenous Q24H    hydrocortisone sodium succinate PF  100 mg Intravenous Q8H    sterile water        sodium chloride  1,000 mL Intravenous Once    QUEtiapine  25 mg Oral BID    divalproex  500 mg Oral 2 times per day    vancomycin  1,000 mg Intravenous Q12H    insulin glargine  20 Units Subcutaneous Nightly    oxyCODONE  10 mg Oral Q6H    heparin flush  3 mL Intravenous 2 times per day    insulin lispro  0-18 Units Subcutaneous Q6H    midodrine  10 mg Oral TID WC    pantoprazole  40 mg Intravenous Daily    And    sodium chloride (PF)  10 mL Intravenous Daily    enoxaparin  110 mg Subcutaneous BID    chlorhexidine  15 mL Mouth/Throat BID    influenza virus vaccine  0.5 mL Intramuscular Prior to discharge    sodium chloride flush  10 mL Intravenous 2 times per day    vitamin C  1,000 mg Oral BID    zinc sulfate  50 mg Oral BID    vitamin D3  400 Units Oral Daily     Continuous Infusions:   cisatracurium (NIMBEX) infusion Stopped (11/25/20 1201)    midazolam 10 mg/hr (11/25/20 1258)    fentaNYL 5 mcg/ml in 0.9%  ml infusion 200 mcg/hr (11/25/20 1445)    norepinephrine 15 mcg/min (11/25/20 1200)    propofol 20 mcg/kg/min (11/25/20 1015)    dextrose       PRN Meds:   magnesium sulfate, 1 g, PRN  potassium chloride, 20 mEq, PRN  sodium phosphate IVPB, 0.16 mmol/kg, PRN    Or  sodium phosphate IVPB, 0.32 mmol/kg, PRN  sodium chloride flush, 10 mL, PRN  heparin flush, 3 mL, PRN  glucose, 15 g, PRN  dextrose, 12.5 g, PRN  glucagon (rDNA), 1 mg, PRN  dextrose, 100 mL/hr, PRN  sodium chloride, 30 mL, PRN  sodium chloride flush, 10 mL, PRN  acetaminophen, 650 mg, Q6H PRN    Or  acetaminophen, 650 mg, Q6H PRN  polyethylene glycol, 17 g, Daily PRN  promethazine, 12.5 mg, Q6H PRN    Or  ondansetron, 4 mg, Q6H PRN        VENT SETTINGS (Comprehensive) (if applicable):  Vent Information  $Ventilation: $Subsequent Day  Skin Assessment: Clean, dry, & intact  Equipment ID: [id number]  Equipment Changed: Airway securing device  Vent Type: [id number]  Vent Mode: AC/VC  Vt Ordered: 400 mL  Rate Set: 30 bmp  Peak Flow: 70 L/min  Pressure Support: 0 cmH20  FiO2 : 100 %  SpO2: 94 %  SpO2/FiO2 ratio: 94  Sensitivity: 3  PEEP/CPAP: 14  I Time/ I Time %: 0 s  Humidification Source: Heated wire  Humidification Temp: 37  Humidification Temp Measured: 36.8  Circuit Condensation: Drained  Mask Type: Full face mask  Mask Size: Medium  Additional Respiratory  Assessments  Pulse: 77  Resp: 30  SpO2: 94 %  Position: Semi-Barnett's  Humidification Source: Heated wire  Humidification Temp: 37  Circuit Condensation: Drained  Oral Care: Mouth swabbed, Mouth moisturizer, Mouth suctioned, Suction toothette  Subglottic Suction Done?: Yes  Cuff Pressure (cm H2O): 29 cm H2O    ABGs:   Recent Labs     11/25/20  0306   PH 7.349*   PCO2 66.4*   PO2 54.2*   HCO3 35.8*   BE 7.8*   O2SAT 86.4*       Laboratory findings:    Complete Blood Count:   Recent Labs     11/23/20  0540 11/24/20 0520 11/25/20  0525   WBC 16.8* 10.1 11.0   HGB 12.0* 12.1* 12.4*   HCT 37.8 39.7 40.1    189 177        Last 3 Blood Glucose:   Recent Labs     11/23/20  0540 11/24/20  0520 11/25/20  0525   GLUCOSE 176* 160* 157*        PT/INR:    Lab Results   Component Value Date    PROTIME 10.9 02/16/2012    INR 0.9 02/16/2012     PTT:    Lab Results   Component Value Date    APTT 103.3 11/16/2020       Comprehensive Metabolic Profile:   Recent Labs     11/23/20  0540 11/24/20 0520 11/25/20  0525    139 139   K 3.8 4.6 5.2*    101 99   CO2 30* 33* 35*   BUN 28* 19 17   CREATININE 0.9 0.8 0.8   GLUCOSE 176* 160* 157*   CALCIUM 8.4* 8.7 9.0   PROT 5.5* 5.6* 5.8*   LABALBU 2.7* 2.6* 2.9*   BILITOT 0.4 0.5 0.4   ALKPHOS 81 82 85   AST 32 31 29   ALT 40 42* 37      Magnesium:   Lab Results   Component Value Date    MG 2.1 11/25/2020     Phosphorus:   Lab Results   Component Value Date    PHOS 4.6 11/25/2020     Ionized Calcium: No results found for: CAION       Troponin: No results for input(s): TROPONINI in the last 72 hours.     Microbiology:  Cultures during this admission:     Blood cultures:                 [] None drawn      [] Negative             [x]  Positive (Details: Coag negative consultants. Critical care services and times documented are independent of procedures and multidisciplinary rounds with Residents. Additionally comprehensive, multidisciplinary rounds were conducted with the MICU team. The case was discussed in detail and plans for care were established. Review of Residents documentation was conducted and revisions were made as appropriate. I agree with the above documented exam, problem list and plan of care. Surya Aguiar M.D.    Pulmonary/Critical Care Medicine   40 min cct excluding procedures

## 2020-11-25 NOTE — PROGRESS NOTES
[address] [place] [address] [facility]  Progress Note    SUBJECTIVE:  Chief Complaint   Patient presents with    Shortness of Breath     76% room air at arrival     Fever    Emesis     The patient is still in the ICU. He is still intubated, sedated and paralyzed. He is still on vasopressors. He is still having fevers. Review of systems:  As stated above in the chief complaint, otherwise negative.     Medications:  Scheduled Meds:   calcium gluconate IVPB  1 g Intravenous Once    furosemide  40 mg Intravenous Once    QUEtiapine  25 mg Oral BID    divalproex  500 mg Oral 2 times per day    vancomycin  1,000 mg Intravenous Q12H    insulin glargine  20 Units Subcutaneous Nightly    oxyCODONE  10 mg Oral Q6H    heparin flush  3 mL Intravenous 2 times per day    insulin lispro  0-18 Units Subcutaneous Q6H    midodrine  10 mg Oral TID WC    pantoprazole  40 mg Intravenous Daily    And    sodium chloride (PF)  10 mL Intravenous Daily    enoxaparin  110 mg Subcutaneous BID    chlorhexidine  15 mL Mouth/Throat BID    influenza virus vaccine  0.5 mL Intramuscular Prior to discharge    sodium chloride flush  10 mL Intravenous 2 times per day    vitamin C  1,000 mg Oral BID    zinc sulfate  50 mg Oral BID    vitamin D3  400 Units Oral Daily     Continuous Infusions:   cisatracurium (NIMBEX) infusion 2.5 mcg/kg/min (11/24/20 1641)    midazolam 10 mg/hr (11/25/20 0048)    fentaNYL 5 mcg/ml in 0.9%  ml infusion 200 mcg/hr (11/25/20 0752)    norepinephrine 15 mcg/min (11/25/20 0655)    propofol 30 mcg/kg/min (11/25/20 0451)    dextrose       PRN Meds:magnesium sulfate, potassium chloride, sodium phosphate IVPB **OR** sodium phosphate IVPB, sodium chloride flush, heparin flush, glucose, dextrose, glucagon (rDNA), dextrose, sodium chloride, sodium chloride flush, acetaminophen **OR** acetaminophen, polyethylene glycol, promethazine **OR** ondansetron    OBJECTIVE:  BP (!) 114/54 Value Date    SEDRATE 80 (H) 11/12/2020     Radiology:  Chest x-ray shows bilateral interstitial infiltrates    Microbiology:   Streptococcus pneumoniae/Legionella urine Ag: Negative  Blood cultures 11/11/2020: Negative x2  Blood cultures 11/21/2020: CoNS in 1 of 2 sets  Of blood cultures 1/22/2020: Negative so far  Respiratory culture 11/17/2020: OP nikita reduced  Respiratory culture 11/21/2020: Candida albicans  Nares screen MRSA: Pending  Arterial line tip culture 11/23/2020: >15 colonies Staphylococcus species    No results for input(s): PROCAL in the last 72 hours. ASSESSMENT:  · Moderate to severe SARS-CoV-2 infection with pneumonia. Completed 5 days of Remdesivir 11/16/2020  · Acute respiratory failure. Ventilator dependent  · Lymphopenia secondary to SARS-CoV-2 infection  · CoNS bacteremia. Probable CLABSI versus contaminant. TLC was removed but not cultured. A-line was changed over a guidewire  · Septic shock. Possible adrenal insufficiency  · Fevers associated to the above  · Leukocytosis associated to the above, improving  · Probable community-acquired pneumonia. Completed 7 days of Levofloxacin 11/18/2020   · Airway colonization with Candida  · Elevation of transaminases associated to the above, improved    PLAN:  · Continue Vancomycin.   Trough was 17.6 11/23/2020  · Start Fluconazole for Candida overgrowth  · Anticoagulation  · Cortisol level  · Consider stress doses of steroids since dexamethasone was just out a couple of days ago  · At this point no need to treat Candida colonization of airways  · Prognosis is poor  · Patient will need tracheostomy and PEG    Discussed with ICU team    Ezra Farias  8:38 AM  11/25/2020

## 2020-11-25 NOTE — CARE COORDINATION
COVID POSITIVE 11/11. Vent/ intubated since 11/15-FIO2 80%, PEEP 14-on paralytic. Was independent at home w/ wife PTA. [facility] following-[facility] referral. Will need to discuss discharge planning w/ wife when pt is more medically stable.   Stephanie Avilez

## 2020-11-25 NOTE — PROGRESS NOTES
Palliative Care Department  [phone]  Palliative Care Progress Note  Provider Didier Domínguez PA-C    Linda Garcia  [id number]  Hospital Day: 15    Referring Provider: Wyatt Ross MD   Palliative Medicine was consulted for assistance with: Goals of care and family support     CHIEF COMPLAINT: Fever, nausea, vomiting    Brief Summary:  Linda Garcia is a 77 y.o. with minimal past medical history was admitted on 11/11/2020 with diagnosis(ses) of Covid-19 and progressive hypoxia requiring intubation and admission to the ICU. ASSESSMENT/PLAN:     PERTINENT HOSPITAL DIAGNOSES:     Viral Pneumonia, COVID-19  -  ID following  -  Treatment: Completed remdesivir and dexamethasone  -  Treatment for secondary bacterial infection vancomycin status post 7 days of levofloxacin     Acute hypoxic respiratory failure  - Treatment per ICU team/primary service        PALLIATIVE CARE ENCOUNTER  - Capacity: At this time, Linda Garcia, Does Not have capacity for medical decision-making.  Capacity is time limited and situation/question specific  - Outcome of goals of care meeting: Continue full code     - Code Status:   Full  - Advanced directives: None  - Surrogate decision maker/Legal NOK:     Contacts:              -[address] [facility] [phone]              -  Other Bernie Faith [phone]     - Spiritual assessment: To be determined  - Bereavement and grief: to be determined    - DISPO: per ICU     Referrals to: none today     SUBJECTIVE:   Chart reviewed  Discussed with bedside RN, ICU rounding team    ICU/Ventilator day #11   Attempting to wean vasopressors  Continued fevers  intubated/sedated/paralyzed    Discussion held over telephone due to current visitor restriction secondary to [address] pandemic  Details of Conversation: Palliative medicine services were introduced to the patient's wife over the phone. She feels she is received good updates from the ICU staff and is aware of his current situation.   CODE STATUS was reviewed and she wishes him to remain a full code. She feels he is a strong active man and will be able to pull through this. There are no formal advanced directives completed. 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 Support was provided through active listening, her life review of his accomplishments and hard work. Palliative medicine will continue to follow and further discuss goals of care. Inpatient medications reviewed: yes  Home Medications reviewed: yes    OBJECTIVE:   According to institutional recommendations and guidelines, a face-to-face encounter was not performed due to the current efforts to prevent transmission of COVID-19 and the need to preserve PPE for other caregivers. Relevant records, nursing assessment, and diagnostic testing including laboratory results and imaging were reviewed. Please reference any relevant documentation elsewhere. Patient was observed through the window and observation as reported below. Care will be coordinated with the primary services and consultants.     Prognosis: Poor    Physical Exam:  BP (!) 114/54   Pulse 96   Temp 99.7 °F (37.6 °C) (Bladder)   Resp 29   Ht 5' 8\" (1.727 m)   Wt 236 lb 12.4 oz (107.4 kg)   SpO2 95%   BMI 36.00 kg/m²   Gen: sedated and intubated,paralyzed  HEENT: endotracheal tube and OG   Lungs: respirations nonlabored and mechanical ventilation   Heart: regular rate and rhythm per monitor  Abdomen: last BM 11/14  : henry catheter   Skin: no wounds documented per nursing assessment  Neuro: Paralyzed and sedated    Objective data reviewed: labs, images, records, medication use, vitals and chart  Relevant data: COVID19 + 11/25    Time/Communication  Greater than 50% of time spent, total 40 minutes in counseling and coordination of care at the bedside/over the telephone regarding goals of care, symptom management, diagnosis and prognosis and see above. Thank you for allowing Palliative Medicine to participate in the care of Colton Owens. Provider Basim Escobedo PA-C  Palliative Medicine    Note: This report was completed using [employer] voiced recognition software. Every effort has been made to ensure accuracy; however, inadvertent computerized transcription errors may be present.

## 2020-11-25 NOTE — PATIENT CARE CONFERENCE
Intensive Care Daily Quality Rounding Checklist        ICU Team Members: Dr. Sawyer Goodman, residents, charge nurse, bedside nurse, respiratory therapist     ICU Day #: NUMBER: 11     Intubation Date: November 15     Ventilator Day #: NUMBER: 11     Central Line Insertion Verl Barn                                                    Day #: NUMBER: 4      Arterial Line Insertion Date: Pansy Sorrel                             Day #: NUMBER: 11     DVT Prophylaxis: Lovenox     GI Prophylaxis: Protonix      Martinez Catheter Insertion Simón Dace                                        Day #: 11                             Continued need (if yes, reason documented and discussed with physician): yes, strict I/O, critically ill pt      Skin Issues/ Wounds and ordered treatment discussed on rounds: SOS precautions      Goals/ Plans for the Day: hydrocortisone, check NICOM, wean pressor, stop paralytic, repeat COVID, wean vent as able

## 2020-11-25 NOTE — PROGRESS NOTES
[facility] Progress Note    Admitting Date and Time: 11/11/2020  2:12 PM  Admit Dx: Hypoxia [R09.02]  Hypoxia [R09.02]    Subjective:  Patient is being followed for Hypoxia [R09.02]  Hypoxia [R09.02]   Pt is intubated and paralyzed,   Still hypoxic, back on pressors. ROS: denies fever, chills, cp, n/v, HA unless stated above.       QUEtiapine  25 mg Oral BID    divalproex  500 mg Oral 2 times per day    vancomycin  1,000 mg Intravenous Q12H    insulin glargine  20 Units Subcutaneous Nightly    oxyCODONE  10 mg Oral Q6H    heparin flush  3 mL Intravenous 2 times per day    insulin lispro  0-18 Units Subcutaneous Q6H    midodrine  10 mg Oral TID WC    pantoprazole  40 mg Intravenous Daily    And    sodium chloride (PF)  10 mL Intravenous Daily    enoxaparin  110 mg Subcutaneous BID    chlorhexidine  15 mL Mouth/Throat BID    influenza virus vaccine  0.5 mL Intramuscular Prior to discharge    sodium chloride flush  10 mL Intravenous 2 times per day    vitamin C  1,000 mg Oral BID    zinc sulfate  50 mg Oral BID    vitamin D3  400 Units Oral Daily     magnesium sulfate, 1 g, PRN  potassium chloride, 20 mEq, PRN  sodium phosphate IVPB, 0.16 mmol/kg, PRN    Or  sodium phosphate IVPB, 0.32 mmol/kg, PRN  sodium chloride flush, 10 mL, PRN  heparin flush, 3 mL, PRN  glucose, 15 g, PRN  dextrose, 12.5 g, PRN  glucagon (rDNA), 1 mg, PRN  dextrose, 100 mL/hr, PRN  sodium chloride, 30 mL, PRN  sodium chloride flush, 10 mL, PRN  acetaminophen, 650 mg, Q6H PRN    Or  acetaminophen, 650 mg, Q6H PRN  polyethylene glycol, 17 g, Daily PRN  promethazine, 12.5 mg, Q6H PRN    Or  ondansetron, 4 mg, Q6H PRN         Objective:    BP (!) 118/53   Pulse 118   Temp 99.8 °F (37.7 °C) (Bladder)   Resp 30   Ht 5' 8\" (1.727 m)   Wt 236 lb 12.4 oz (107.4 kg)   SpO2 91%   BMI 36.00 kg/m²     General Appearance: intubated and paralyzed, pronated  Skin: warm and dry  Head: normocephalic and atraumatic  Eyes: pupils equal, round, and reactive to light, extraocular eye movements intact, conjunctivae normal  Neck: neck supple and non tender without mass   Pulmonary/Chest: diminished  bilaterally- no wheezes, rales or rhonchi, normal air movement  Cardiovascular: normal rate, prontaed  Extremities: no cyanosis, no clubbing and no edema  Neurologic: intubated and paralyzed        Recent Labs     11/23/20 0540 11/24/20  0520 11/25/20  0525    139 139   K 3.8 4.6 5.2*    101 99   CO2 30* 33* 35*   BUN 28* 19 17   CREATININE 0.9 0.8 0.8   GLUCOSE 176* 160* 157*   CALCIUM 8.4* 8.7 9.0       Recent Labs     11/23/20 0540 11/24/20  0520 11/25/20  0525   WBC 16.8* 10.1 11.0   RBC 4.21 4.28 4.37   HGB 12.0* 12.1* 12.4*   HCT 37.8 39.7 40.1   MCV 89.8 92.8 91.8   MCH 28.5 28.3 28.4   MCHC 31.7* 30.5* 30.9*   RDW 16.0* 16.0* 16.0*    189 177   MPV 11.4 11.3 11.4         Assessment:    Active Problems:    Hypoxia    COVID-19    Hypoxemia    Palliative care by specialist    Counseling regarding advance care planning and goals of care  Resolved Problems:    * No resolved hospital problems. *      Plan:  1. Acute hypoxic respiratory failure due to COVID-19 pneumonia, presented with oxygen saturation 76% on room air, intubated and paralyzed, on pressors, currently on 60% FiO2 with O2 sat 92%,  in supine position  2. Acute COVID-19 pneumonia, finished Decadron, ID on board, finished 5 days of remdesivir  3. Superimposed bacterial pneumonia finished 7 days of Levaquin. 4.  CLABSI, central line removed and art line was changed, started per ID on IV vanco.  5.  Suspecting cytokine storm with elevated D-dimer, started full dose anticoagulation. 6.  Acute renal failure, presented with creatinine of 2.4 not sure about the patient's baseline he might have CKD. Patient received IV fluids, resolved, cr at 0.8  7.   Elevated transaminases, most likely due to COVID-19 infection and possible cytokine storm, trending down, continue to monitor. 8. Septic shock developed during the admission, due to COVID 19 pneumonia, on pressors, still on steroids. NOTE: This report was transcribed using voice recognition software. Every effort was made to ensure accuracy; however, inadvertent computerized transcription errors may be present.   Electronically signed by Robyn Lockett MD on 11/25/2020 at 7:07 AM

## 2020-11-26 NOTE — PROGRESS NOTES
Palliative Care Department  [phone]  Palliative Care Progress Note  Provider Lila Alejandro PA-C    Carroll Rogers  [id number]  Hospital Day: 16    Referring Provider: Cielo Richard MD   Palliative Medicine was consulted for assistance with: Goals of care and family support     CHIEF COMPLAINT: Fever, nausea, vomiting    Brief Summary:  Carroll Rogers is a 77 y.o. with minimal past medical history was admitted on 11/11/2020 with diagnosis(ses) of Covid-19 and progressive hypoxia requiring intubation and admission to the ICU. ASSESSMENT/PLAN:     PERTINENT HOSPITAL DIAGNOSES:     Viral Pneumonia, COVID-19  -  ID following  -  Treatment: Completed remdesivir and dexamethasone  -  Treatment for secondary bacterial infection vancomycin status post 7 days of levofloxacin     Acute hypoxic respiratory failure  - Treatment per ICU team/primary service  -Unable to complete tracheostomy at this point due to support requirements  -Paralysis restarted due to hypoxia      PALLIATIVE CARE ENCOUNTER  - Capacity: At this time, Carroll Rogers, Does Not have capacity for medical decision-making.  Capacity is time limited and situation/question specific  - Code Status:   Full  - Advanced directives: None  - Surrogate decision maker/Legal NOK:     Contacts:              -[address] [facility] [address] [facility] [phone]   - Spiritual assessment:  To be determined  - Bereavement and grief: to be determined    - DISPO: per ICU     Referrals to: none today     SUBJECTIVE:   Chart reviewed  Discussed with bedside RN, ICU rounding team    ICU/Ventilator day #12   Attempting to wean vasopressors  Paralysis lifted temporarily, patient became hypoxic and paralytic restarted    Inpatient medications reviewed: yes  Home Medications reviewed: yes    OBJECTIVE:   According to institutional recommendations and guidelines, a face-to-face encounter was not performed due to the current efforts to prevent transmission of COVID-19 and the need to preserve PPE for other caregivers. Relevant records, nursing assessment, and diagnostic testing including laboratory results and imaging were reviewed. Please reference any relevant documentation elsewhere. Patient was observed through the window and observation as reported below. Care will be coordinated with the primary services and consultants. Prognosis: Poor    Physical Exam:  BP (!) 114/54   Pulse 77   Temp 96.8 °F (36 °C) (Bladder)   Resp 30   Ht 5' 8\" (1.727 m)   Wt 239 lb 10.2 oz (108.7 kg)   SpO2 93%   BMI 36.44 kg/m²   Gen: sedated and intubated,paralyzed  HEENT: endotracheal tube and OG   Lungs: respirations nonlabored and mechanical ventilation   Heart: regular rate and rhythm per monitor  Abdomen: last BM 11/14  : henry catheter   Skin: no wounds documented per nursing assessment  Neuro: Paralyzed and sedated    Objective data reviewed: labs, images, records, medication use, vitals and chart  Relevant data: COVID19 + 11/25    Time/Communication  Greater than 50% of time spent, total 15 minutes in counseling and coordination of care at the bedside/over the telephone regarding see above. Thank you for allowing Palliative Medicine to participate in the care of Eloina Mccallum. Provider Sahara Kevin PA-C  Palliative Medicine    Note: This report was completed using [employer] voiced recognition software. Every effort has been made to ensure accuracy; however, inadvertent computerized transcription errors may be present. present

## 2020-11-26 NOTE — PROGRESS NOTES
Critical Care Team - Daily Progress Note         Date and time: 11/26/2020 3:00 PM  Patient's name:  Cassie Nieves Record Number: [id number]  Patient's account/billing number: [de-identified]  Patient's YOB: 1955  Age: 77 y.o. Date of Admission: 11/11/2020  2:12 PM  Length of stay during current admission: 15      Primary Care Physician: No primary care provider on file. ICU Attending Physician: Dr. Alessandra Rodas Status: Full Code    Reason for ICU admission: Acute hypoxic respiratory failure secondary to COVID-19 pneumonia      SUBJECTIVE:     BRIEF HISTORY: History is gathered from medical records, patient was in respiratory distress on BiPAP, emergently being intubated on arrival in the ICU.     Daxa pearce 65 y.o. arrived to the ED 11/11 with fever, nausea, vomiting.  He reports the symptoms began 6 days prior. Loss of appetite red green-white productive sputum, urinary frequency, diarrhea rhinorrhea, nasal congestion chest pain right upper quadrant abdominal pain.  He had been on a Z-Michael from his primary physician no improvement in symptoms he complains of right sided pleuritic chest pain with cough.  ABG within normal range, leukocytosis, lymphopenia elevated D-dimer greater than 5250 he was started on heparin drip, ferritin 1710 and admitted await ID consult for treatment will need remdesivir, infectious work-up.  He arrived hypoxic was placed on 15 L nonrebreather he denies history of PEs or DVTs      Patient was admitted to the hospital, was on BiPAP, OptiFlow. On 11/15/2020, patient was unable to obtain O2 sat above upper 80s, was in respiratory distress with respiratory rate of 40-50/min. Because of his increasing respiratory distress on BiPAP, patient was transferred to ICU, intubated. He has been followed by pulmonology, infectious disease throughout hospital stay.      OVERNIGHT EVENTS:    11/16/20: Some soft BPs in the evening after being proned, Silas-Synephrine started. 11/17/20: Patient supine. Will attempt 6 hours and switch back to prone. 11/18/20: Patient prone overnight. Sit to supine this morning. Tolerated supine throughout the day. Put back to prone for 1 more night. 11/19/20: Patient switched to supine. Will repeat gas. Off of pressors. 11/20/20: Tolerated parenteral day. Became agitated overnight and propofol was started. .  11/21/20: Needing pressors overnight. O2 saturation good and less moving patient. With any movement or agitation, O2 saturation drops. Back on 3 sedatives overnight. leukocytosis, infectious work-up. 11/22/20: Agitated overnight. Attempt to stand up out of bed, he had feet on the ground. Nursing put him back in bed. He then calmed. Patient again became agitated with tachycardia and hypertension. He is going for Versed. Resting comfortably in bed. 11/23/20; still agitated overnight. Attempted to stand up again. Restarted back on paralytics. 11/24/20:  Wean fio2 and vent as able. Off pressors  11/25/20: back on pressors, art line tip growing staph. Start stress dose steroids  11/26/20: still requiring pressors.  Became hypoxic again despite maxed vent setting, reparalyzed    CURRENT VENTILATION STATUS:     [x] Ventilator  [] BIPAP  [] Nasal Cannula [] Room Air        SECRETIONS : Amount:  [x] Small [] Moderate  [] Large  [] None  Color:     [] White [] Colored  [] Bloody    SEDATION:    [x] Propofol gtt  [x] Versed gtt  [] Ativan gtt   [] No Sedation    PARALYZED:  [] No    [x] Yes      VASOPRESSORS:  [] No    [x] Yes    If yes -   [x] Levophed       [] Dopamine     [] Vasopressin       [] Dobutamine  [] Phenylephrine         [] Epinephrine    CENTRAL LINES:     [] No   [] Yes   (Date of Insertion:  11/15 switching to PICC today)           If yes -     [x] Right IJ     [] Left IJ [] Right Femoral [] Left Femoral                   [] Right Subclavian [] Left Subclavian       HANSON'S CATHETER:   [] No   [x] Yes  (Date of Insertion:  11/15 )     URINE OUTPUT:            [x] Good   [] Low              [] Anuric      OBJECTIVE:     VITAL SIGNS:  BP (!) 114/54   Pulse 77   Temp 96.8 °F (36 °C) (Bladder)   Resp 30   Ht 5' 8\" (1.727 m)   Wt 239 lb 10.2 oz (108.7 kg)   SpO2 93%   BMI 36.44 kg/m²   Tmax over 24 hours:  Temp (24hrs), Av.6 °F (36.4 °C), Min:96.8 °F (36 °C), Max:98.9 °F (37.2 °C)      Patient Vitals for the past 6 hrs:   Temp Temp src Pulse Resp SpO2   20 1244 -- -- 77 30 93 %   20 1200 96.8 °F (36 °C) Bladder 79 30 94 %   20 1145 -- -- 81 29 93 %   20 1130 -- -- 88 8 92 %   20 1115 -- -- 85 29 93 %   20 1100 -- -- 86 30 94 %   20 1045 -- -- 86 29 94 %   20 1030 -- -- 87 30 93 %   20 1015 -- -- 88 30 93 %   20 1000 -- -- 89 30 94 %   20 0945 -- -- 92 30 93 %   20 0930 -- -- 95 22 (!) 88 %   20 0915 -- -- 95 12 (!) 84 %         Intake/Output Summary (Last 24 hours) at 2020 1500  Last data filed at 2020 1200  Gross per 24 hour   Intake 3649.53 ml   Output 4200 ml   Net -550.47 ml     Wt Readings from Last 2 Encounters:   20 239 lb 10.2 oz (108.7 kg)     Body mass index is 36.44 kg/m². PHYSICAL EXAMINATION:  General: Intubated, sedated, supine  Eyes: conjunctivae/corneas clear, sclera non icteric  Ears: no obvious scars, no lesions, no masses  Mouth: mucous membranes somewhat dry, no obvious oral sores.   ET tube in place  Head: normocephalic, atraumatic  Neck: no JVD, trachea midline  Lungs: Diminished bilaterally  Heart: tachycardic, regular rhythm, no murmur, normal S1, S2  Abdomen: soft, non-tender; bowel sounds normal; no masses, no organomegaly  Extremities: no extremity edema, extremities atraumatic, no cyanosis, no clubbing, 2+ pedal pulses palpated  Skin: normal color, normal texture, normal turgor, no rashes, no lesions  Neurologic: unable to assess   MEDICATIONS:    Scheduled Meds:   dornase alpha  2.5 mg Inhalation Daily    albuterol  2.5 mg Nebulization Q6H    acetylcysteine  600 mg Inhalation BID    fluconazole  400 mg Intravenous Q24H    hydrocortisone sodium succinate PF  100 mg Intravenous Q8H    QUEtiapine  25 mg Oral BID    divalproex  500 mg Oral 2 times per day    insulin glargine  20 Units Subcutaneous Nightly    oxyCODONE  10 mg Oral Q6H    heparin flush  3 mL Intravenous 2 times per day    insulin lispro  0-18 Units Subcutaneous Q6H    midodrine  10 mg Oral TID WC    pantoprazole  40 mg Intravenous Daily    And    sodium chloride (PF)  10 mL Intravenous Daily    enoxaparin  110 mg Subcutaneous BID    chlorhexidine  15 mL Mouth/Throat BID    influenza virus vaccine  0.5 mL Intramuscular Prior to discharge    sodium chloride flush  10 mL Intravenous 2 times per day    vitamin C  1,000 mg Oral BID    zinc sulfate  50 mg Oral BID    vitamin D3  400 Units Oral Daily     Continuous Infusions:   cisatracurium (NIMBEX) infusion 3.5 mcg/kg/min (11/26/20 1326)    midazolam 8 mg/hr (11/26/20 1245)    fentaNYL 5 mcg/ml in 0.9%  ml infusion 200 mcg/hr (11/26/20 1042)    norepinephrine 8 mcg/min (11/26/20 0930)    propofol 25 mcg/kg/min (11/26/20 1326)    dextrose       PRN Meds:   magnesium sulfate, 1 g, PRN  potassium chloride, 20 mEq, PRN  sodium phosphate IVPB, 0.16 mmol/kg, PRN    Or  sodium phosphate IVPB, 0.32 mmol/kg, PRN  sodium chloride flush, 10 mL, PRN  heparin flush, 3 mL, PRN  glucose, 15 g, PRN  dextrose, 12.5 g, PRN  glucagon (rDNA), 1 mg, PRN  dextrose, 100 mL/hr, PRN  sodium chloride, 30 mL, PRN  sodium chloride flush, 10 mL, PRN  acetaminophen, 650 mg, Q6H PRN    Or  acetaminophen, 650 mg, Q6H PRN  polyethylene glycol, 17 g, Daily PRN  promethazine, 12.5 mg, Q6H PRN    Or  ondansetron, 4 mg, Q6H PRN        VENT SETTINGS (Comprehensive) (if applicable):  Vent Information  $Ventilation: $Subsequent Day  Skin Assessment: Clean, dry, & intact  Equipment ID: [id number]  Equipment Changed: Airway securing device  Vent Type: [id number]  Vent Mode: AC/VC  Vt Ordered: 400 mL  Rate Set: 30 bmp  Peak Flow: 70 L/min  Pressure Support: 0 cmH20  FiO2 : 95 %  SpO2: 93 %  SpO2/FiO2 ratio: 97.89  Sensitivity: 3  PEEP/CPAP: 14  I Time/ I Time %: 0 s  Humidification Source: Heated wire  Humidification Temp: 37  Humidification Temp Measured: 36.9  Circuit Condensation: Drained  Mask Type: Full face mask  Mask Size: Medium  Additional Respiratory  Assessments  Pulse: 77  Resp: 30  SpO2: 93 %  Position: Semi-Barnett's  Humidification Source: Heated wire  Humidification Temp: 37  Circuit Condensation: Drained  Oral Care: Mouthwash, Mouth swabbed  Subglottic Suction Done?: Yes  Cuff Pressure (cm H2O): 29 cm H2O    ABGs:   Recent Labs     11/26/20 0540   PH 7.394   PCO2 62.3*   PO2 60.9*   HCO3 37.2*   BE 10.1*   O2SAT 89.3*       Laboratory findings:    Complete Blood Count:   Recent Labs     11/24/20 0520 11/25/20 0525 11/26/20 0530   WBC 10.1 11.0 9.8   HGB 12.1* 12.4* 11.7*   HCT 39.7 40.1 37.1    177 152        Last 3 Blood Glucose:   Recent Labs     11/24/20 0520 11/25/20 0525 11/26/20  0530   GLUCOSE 160* 157* 213*        PT/INR:    Lab Results   Component Value Date    PROTIME 10.9 02/16/2012    INR 0.9 02/16/2012     PTT:    Lab Results   Component Value Date    APTT 103.3 11/16/2020       Comprehensive Metabolic Profile:   Recent Labs     11/24/20 0520 11/25/20 0525 11/26/20  0530    139 142   K 4.6 5.2* 4.8    99 101   CO2 33* 35* 36*   BUN 19 17 19   CREATININE 0.8 0.8 0.7   GLUCOSE 160* 157* 213*   CALCIUM 8.7 9.0 8.7   PROT 5.6* 5.8* 5.4*   LABALBU 2.6* 2.9* 2.9*   BILITOT 0.5 0.4 0.5   ALKPHOS 82 85 78   AST 31 29 33   ALT 42* 37 40      Magnesium:   Lab Results   Component Value Date    MG 2.2 11/26/2020     Phosphorus:   Lab Results   Component Value Date    PHOS 3.6 11/26/2020     Ionized Calcium: No results found for: JAKE       Troponin: No results for input(s): TROPONINI in the last 72 hours. Microbiology:  Cultures during this admission:     Blood cultures:                 [] None drawn      [] Negative             [x]  Positive (Details: Coag negative staph)  Urine Culture:                   [x] None drawn      [] Negative             []  Positive (Details:  )  Sputum Culture:               [] None drawn       [] Negative             [x]  Positive (Details: repeat sent today 11/21 pending)  Endotracheal aspirate:     [] None drawn       [] Negative             [x]  Positive (Candida)  Other pertinent Labs: Strep and legionella ag negative  Art line tip-- staph aureus    Radiology/Imaging:     Chest x-ray:  11/23/20     Impression    1. There is no interval change in extensive bilateral airspace disease. ASSESSMENT:     Active Problems:    Hypoxia    COVID-19    Hypoxemia    Palliative care by specialist    Counseling regarding advance care planning and goals of care  Resolved Problems:    * No resolved hospital problems. *      Additional assessment:    COVID-19 pneumonia  Acute Respiratory failure secondary to COVID-19 pneumonia  Community acquired bacterial pneumonia  Hypotension -resolved  Hyperglycemia  Septic shock        PLAN:     WEAN PER PROTOCOL:  [] No   [x] Yes  [] N/A    DISCONTINUE ANY LABS:   [x] No   [] Yes    ICU PROPHYLAXIS:  Stress ulcer:  [x] PPI Agent  [] [id number] [] Sucralfate  [] Other:  VTE:  [x] Enoxaparin  [] Unfract.  Heparin Subcut  [] EPC Cuffs    NUTRITION:  [] NPO [] Tube Feeding (Specify: ) [] TPN  [x] PO (Diet: DIET TUBE FEED CONTINUOUS/CYCLIC NPO; Semi-elemental; Orogastric; Continuous; 20; 65  Diet Tube Feed Modular: Protein Modular)    HOME MEDICATIONS RECONCILED: [] No  [x] Yes    INSULIN DRIP:   [x] No   [] Yes    CONSULTATION NEEDED:  [x] No   [] Yes    FAMILY UPDATED:    [] No   [x] Yes    TRANSFER OUT OF ICU:   [x] No   [] Yes    ADDITIONAL PLAN:  SYSTEMS ASSESSMENT     Neuro   Intubated, sedated and paralyzed   -Increased agitation. Continue fentanyl, Versed, propofol. Add Depakote. And wean as able   - restart paralytic   -triglyceride level-- okay     Respiratory   Acute hypoxic respiratory failure secondary to COVID-19 pneumonia  -Was initially on BiPAP, was intubated 11/15  -ID following  -levofloxacin--finished for presumed community-acquired pneumonia superimposed on COVID-19 PNA  -Patient tolerating supine. Wean FiO2 and PEEP as able. Keep plateau pressures less than 30.  -Repeat respiratory culture + candida --? fluconazole  -wean fio2  -hypoxia worsening, re-paralyze  -mucomyst, pulmozyme,proventil   -will need trach/peg    Superimposed PNA-completed 7 days of Levaquin on 11/18     Cardiovascular   Shock, septic   -CLABSI, art line growning staph and resp candida   -vanco and fluconazole   -add solucortef   -Levo as needed for MAP greater than 65   -LAKIA   -liter bolus  Prolonged qtc     Gastrointestinal   Tube feeds     Renal   Hypernatremia-resolved  Hyperkalemia- resolved     Infectious Disease   COVID-19 pneumonia   -On Decadron--finished. remdesivir-finished   -ARDS protocol   -wean as able   -repeat covid positive    Superimposed community-acquired pneumonia,   - followed by ID, finish 7 days of Levaquin    Gram + cocci bacteremia   -vacnco   -repeat cx pending   -art line tip growing staph   -LAKIA   -Respiratory culture + Candida-- fluconazol   -PICC     Hematology/Oncology   Hypercoagulability secondary to Covid pneumonia   -continue therapeutic lovenox     Endocrine   Hyperglycemia related to steroids -high-dose sliding scale insulin, Lantus 10 units daily. --Better controlled     Social/Spiritual/DNR/Other   Full code  Palliative care consult      Diet: DIET TUBE FEED CONTINUOUS/CYCLIC NPO; Semi-elemental; Orogastric; Continuous; 20; 65  Diet Tube Feed Modular: Protein Modular  CODE Status: Full Code    Dispo: Maintain ICU level of care        [facility] Hospital[facility] Latif DO  Resident, PGY-2  11/26/2020  3:00 PM     I personally saw, examined and provided care for the patient. Radiographs, labs and medication list were reviewed by me independently. I spoke with bedside nursing, therapists and consultants. Critical care services and times documented are independent of procedures and multidisciplinary rounds with Residents. Additionally comprehensive, multidisciplinary rounds were conducted with the MICU team. The case was discussed in detail and plans for care were established. Review of Residents documentation was conducted and revisions were made as appropriate. I agree with the above documented exam, problem list and plan of care. Poor prognosis   Godfrey Barrow M.D.    Pulmonary/Critical Care Medicine   38 min cct excluding procedures

## 2020-11-26 NOTE — PROGRESS NOTES
[address] [place] [address] [facility]  JORDONIDA  Progress Note    SUBJECTIVE:  Chief Complaint   Patient presents with    Shortness of Breath     76% room air at arrival     Fever    Emesis     The patient is still in the ICU. He is still intubated, sedated and no longer paralyzed. He is still on vasopressors. Fever seem to be improving. Review of systems:  As stated above in the chief complaint, otherwise negative.     Medications:  Scheduled Meds:   fluconazole  400 mg Intravenous Q24H    hydrocortisone sodium succinate PF  100 mg Intravenous Q8H    QUEtiapine  25 mg Oral BID    divalproex  500 mg Oral 2 times per day    vancomycin  1,000 mg Intravenous Q12H    insulin glargine  20 Units Subcutaneous Nightly    oxyCODONE  10 mg Oral Q6H    heparin flush  3 mL Intravenous 2 times per day    insulin lispro  0-18 Units Subcutaneous Q6H    midodrine  10 mg Oral TID WC    pantoprazole  40 mg Intravenous Daily    And    sodium chloride (PF)  10 mL Intravenous Daily    enoxaparin  110 mg Subcutaneous BID    chlorhexidine  15 mL Mouth/Throat BID    influenza virus vaccine  0.5 mL Intramuscular Prior to discharge    sodium chloride flush  10 mL Intravenous 2 times per day    vitamin C  1,000 mg Oral BID    zinc sulfate  50 mg Oral BID    vitamin D3  400 Units Oral Daily     Continuous Infusions:   cisatracurium (NIMBEX) infusion Stopped (11/25/20 1201)    midazolam 10 mg/hr (11/26/20 0400)    fentaNYL 5 mcg/ml in 0.9%  ml infusion 200 mcg/hr (11/26/20 0454)    norepinephrine 13.013 mcg/min (11/26/20 0000)    propofol 40 mcg/kg/min (11/26/20 0410)    dextrose       PRN Meds:magnesium sulfate, potassium chloride, sodium phosphate IVPB **OR** sodium phosphate IVPB, sodium chloride flush, heparin flush, glucose, dextrose, glucagon (rDNA), dextrose, sodium chloride, sodium chloride flush, acetaminophen **OR** acetaminophen, polyethylene glycol, promethazine **OR** ondansetron    OBJECTIVE:  BP (!) 114/54   Pulse 92   Temp 98.9 °F (37.2 °C) (Bladder)   Resp (!) 46   Ht 5' 8\" (1.727 m)   Wt 239 lb 10.2 oz (108.7 kg)   SpO2 (!) 76%   BMI 36.44 kg/m²   Temp  Av.4 °F (36.9 °C)  Min: 96.8 °F (36 °C)  Max: 99.7 °F (37.6 °C)  Constitutional: The patient is lying in bed. He is intubated, sedated and paralyzed. He is supinated. FiO2 back up to 100%. PEEP14. Skin: Dry. No rashes were noted. HEENT: Round pupils. No jaundice. ET tube. OG tube. Neck: Supple to movements. Chest: Good breath sounds bilaterally. No crackles were heard. Cardiovascular: Heart sounds rhythmic and regular. Abdomen: Diminished bowel sounds. Soft to palpation. Extremities: No edema. Lines: Right PICC 2020. Right femoral arterial line 2020.     Laboratory and Tests Review:  Lab Results   Component Value Date    WBC 9.8 2020    WBC 11.0 2020    WBC 10.1 2020    HGB 11.7 (L) 2020    HCT 37.1 2020    MCV 90.0 2020     2020     Lab Results   Component Value Date    NEUTROABS 8.24 (H) 2020    NEUTROABS 8.34 (H) 2020    NEUTROABS 9.29 (H) 2020     No results found for: RUST  Lab Results   Component Value Date    ALT 40 2020    AST 33 2020    ALKPHOS 78 2020    BILITOT 0.5 2020     Lab Results   Component Value Date     2020    K 4.8 2020    K 3.7 11/15/2020     2020    CO2 36 2020    BUN 19 2020    CREATININE 0.7 2020    CREATININE 0.8 2020    CREATININE 0.8 2020    GFRAA >60 2020    LABGLOM >60 2020    GLUCOSE 213 2020    GLUCOSE 154 2012    PROT 5.4 2020    LABALBU 2.9 2020    CALCIUM 8.7 2020    BILITOT 0.5 2020    ALKPHOS 78 2020    AST 33 2020    ALT 40 2020     Lab Results   Component Value Date    CRP 2.1 (H) 2020    CRP 0.4 2020    CRP 0.5 (H) 11/21/2020     Lab Results   Component Value Date    SEDRATE 86 (H) 11/12/2020     Radiology:  Chest x-ray shows bilateral interstitial infiltrates    Microbiology:   Streptococcus pneumoniae/Legionella urine Ag: Negative  Blood cultures 11/11/2020: Negative x2  Blood cultures 11/21/2020: CoNS in 1 of 2 sets  Blood cultures 11/22/2020: Negative so far  Respiratory culture 11/17/2020: OP nikita reduced  Respiratory culture 11/21/2020: Candida albicans  Nares screen MRSA: Pending  Arterial line tip culture 11/23/2020: >15 colonies Staphylococcus species    No results for input(s): PROCAL in the last 72 hours. ASSESSMENT:  · Moderate to severe SARS-CoV-2 infection with pneumonia. Completed 5 days of Remdesivir 11/16/2020  · Acute respiratory failure. Ventilator dependent  · Lymphopenia secondary to SARS-CoV-2 infection  · CoNS bacteremia. Probable CLABSI TLC was removed. Repeat cultures are negative  · Adrenal crisis mimicking septic shock, improved on steroids  · Fevers associated to the above  · Leukocytosis associated to the above, resolved  · Probable community-acquired pneumonia.   Completed 7 days of Levofloxacin 11/18/2020   · Airway colonization with Candida  · Elevation of transaminases associated to the above, improved    PLAN:  · Vancomycin can be discontinued  · Continue fluconazole for Candida overgrowth  · Anticoagulation  · Stress dose steroids  · Prognosis is poor  · Patient will need tracheostomy and PEG    Spoke with nursing    Jolie Crowley  7:44 AM  11/26/2020

## 2020-11-26 NOTE — PATIENT CARE CONFERENCE
Intensive Care Daily Quality Rounding Checklist        ICU Team Members: Dr. Sherryle Greenhouse, residents, charge nurse, bedside nurse, respiratory therapist     ICU Day #: NUMBER: 11     Intubation Date: November 15     Ventilator Day #: NUMBER: 11     Central Line Insertion Chito Weldon                                                    Day #: NUMBER: 5      Arterial Line Insertion Date: Henretta Kocher                             Day #: NUMBER: 11     DVT Prophylaxis: Lovenox     GI Prophylaxis: Protonix      Martinez Catheter Insertion Al[facility]                                        Day #: 11                             Continued need (if yes, reason documented and discussed with physician): yes, strict I/O, critically ill pt      Skin Issues/ Wounds and ordered treatment discussed on rounds: SOS precautions      Goals/ Plans for the Day: Monitor labs and vitals. Breathing treatments. Restart paralytic.

## 2020-11-26 NOTE — PROGRESS NOTES
PALLIATIVE MEDICINE    Palliative medicine consulted for goals of care, code status discussion and family support. Patient's wife next of kin and she established she wished to continue him as a full code. She wishes to continue current care however is uncertain that she would want him to proceed with a tracheostomy and be in a nursing facility long-term. Discussed with ICU staff  Currently oxygen/respiratory support requirements to significant to proceed with tracheostomy  Current goals have been established. Advised to reconsult if further discussions and establishment of goals of care are needed. Palliative medicine will sign-off. Please re-consult our service if additional Palliative medicine needs arise or there is a change in the patients condition. Thank you for the consult.     Ludwig Hatchet PA-C

## 2020-11-26 NOTE — PLAN OF CARE
Problem: Restraint Use - Nonviolent/Non-Self-Destructive Behavior:  Goal: Absence of restraint indications  Description: Absence of restraint indications  Outcome: Not Met This Shift  Goal: Absence of restraint-related injury  Description: Absence of restraint-related injury  Outcome: Met This Shift

## 2020-11-27 NOTE — CARE COORDINATION
COVID POSITIVE 11/11. Vent/ intubated since 11/15-FIO2 80%, PEEP 14- on paralytic, iv pressor. Was independent at home w/ wife PTA. [facility] following-[facility] referral. Will need to discuss discharge planning w/ wife when pt is more medically stable.  Arik Higgins

## 2020-11-27 NOTE — PATIENT CARE CONFERENCE
Intensive Care Daily Quality Rounding Checklist        ICU Team Members:  Dr. Juju Martinez, Anish Latif & Melinda (residents), charge nurse, bedside nurse    ICU Day #: NUMBER: 13     Intubation Date: November 15     Ventilator Day #: NUMBER: 13     Central Line Insertion Chi Loss                                                    Day #: NUMBER: 6      Arterial Line Insertion Date: [place]                              TFX #: NUMBER: 13     DVT Prophylaxis: Lovenox     GI Prophylaxis: Protonix      Martinez Catheter Insertion Dustin Bach                                        Day #: 13                             Continued need (if yes, reason documented and discussed with physician): yes, strict I/O, critically ill pt      Skin Issues/ Wounds and ordered treatment discussed on rounds: SOS precautions      Goals/ Plans for the Day: Daily labs, check NICOM, wean pressor, wean vent as able

## 2020-11-27 NOTE — PROGRESS NOTES
[facility] Progress Note    Admitting Date and Time: 11/11/2020  2:12 PM  Admit Dx: Hypoxia [R09.02]  Hypoxia [R09.02]    Subjective:  Patient is being followed for Hypoxia [R09.02]  Hypoxia [R09.02]   Pt is intubated and paralyzed, worsening oxygenation, back on 80% FiO2 with 97%% sats, back on pressors. ROS: denies fever, chills, cp, n/v, HA unless stated above.       dornase alpha  2.5 mg Inhalation Daily    albuterol  2.5 mg Nebulization Q6H    acetylcysteine  600 mg Inhalation BID    fluconazole  400 mg Intravenous Q24H    hydrocortisone sodium succinate PF  100 mg Intravenous Q8H    QUEtiapine  25 mg Oral BID    divalproex  500 mg Oral 2 times per day    insulin glargine  20 Units Subcutaneous Nightly    oxyCODONE  10 mg Oral Q6H    heparin flush  3 mL Intravenous 2 times per day    insulin lispro  0-18 Units Subcutaneous Q6H    midodrine  10 mg Oral TID WC    pantoprazole  40 mg Intravenous Daily    And    sodium chloride (PF)  10 mL Intravenous Daily    enoxaparin  110 mg Subcutaneous BID    chlorhexidine  15 mL Mouth/Throat BID    influenza virus vaccine  0.5 mL Intramuscular Prior to discharge    sodium chloride flush  10 mL Intravenous 2 times per day    vitamin C  1,000 mg Oral BID    zinc sulfate  50 mg Oral BID    vitamin D3  400 Units Oral Daily     magnesium sulfate, 1 g, PRN  potassium chloride, 20 mEq, PRN  sodium phosphate IVPB, 0.16 mmol/kg, PRN    Or  sodium phosphate IVPB, 0.32 mmol/kg, PRN  sodium chloride flush, 10 mL, PRN  heparin flush, 3 mL, PRN  glucose, 15 g, PRN  dextrose, 12.5 g, PRN  glucagon (rDNA), 1 mg, PRN  dextrose, 100 mL/hr, PRN  sodium chloride, 30 mL, PRN  sodium chloride flush, 10 mL, PRN  acetaminophen, 650 mg, Q6H PRN    Or  acetaminophen, 650 mg, Q6H PRN  polyethylene glycol, 17 g, Daily PRN  promethazine, 12.5 mg, Q6H PRN    Or  ondansetron, 4 mg, Q6H PRN         Objective:    BP (!) 114/54   Pulse 82   Temp 97.2 °F (36.2 °C) (Bladder)   Resp 30   Ht 5' 8\" (1.727 m)   Wt 239 lb 10.2 oz (108.7 kg)   SpO2 97%   BMI 36.44 kg/m²     General Appearance: intubated and paralyzed  Skin: warm and dry  Head: normocephalic and atraumatic  Eyes: pupils equal, round, and reactive to light, extraocular eye movements intact, conjunctivae normal  Neck: neck supple and non tender without mass   Pulmonary/Chest: diminished  bilaterally- no wheezes, rales or rhonchi, normal air movement  Cardiovascular: normal rate, prontaed  Extremities: no cyanosis, no clubbing and no edema  Neurologic: intubated and paralyzed        Recent Labs     11/25/20 0525 11/26/20 0530 11/27/20 0512    142 141   K 5.2* 4.8 4.5   CL 99 101 105   CO2 35* 36* 32*   BUN 17 19 16   CREATININE 0.8 0.7 0.8   GLUCOSE 157* 213* 196*   CALCIUM 9.0 8.7 7.7*       Recent Labs     11/25/20 0525 11/26/20 0530 11/27/20 0512   WBC 11.0 9.8 8.3   RBC 4.37 4.12 3.61*   HGB 12.4* 11.7* 10.4*   HCT 40.1 37.1 33.4*   MCV 91.8 90.0 92.5   MCH 28.4 28.4 28.8   MCHC 30.9* 31.5* 31.1*   RDW 16.0* 15.9* 16.2*    152 139   MPV 11.4 11.7 12.6*         Assessment:    Active Problems:    Hypoxia    COVID-19    Hypoxemia    Palliative care by specialist    Counseling regarding advance care planning and goals of care  Resolved Problems:    * No resolved hospital problems. *      Plan:  1. Acute hypoxic respiratory failure due to COVID-19 pneumonia, presented with oxygen saturation 76% on room air, intubated and paralyzed, on pressors, currently on 80% FiO2 with O2 sat 97%,  2.  Acute COVID-19 pneumonia, finished Decadron, ID on board, finished 5 days of remdesivir, CRP down to 0.8  3. Superimposed bacterial pneumonia finished 7 days of Levaquin. 4.  CLABSI, central line removed and art line was changed, started per ID on IV vanco.  5.  Suspecting cytokine storm with elevated D-dimer, started full dose anticoagulation.   6.  Acute renal failure, presented with creatinine of 2.4 not sure about the patient's baseline he might have CKD. Patient received IV fluids, resolved, cr at 0.8  7. Elevated transaminases, most likely due to COVID-19 infection and possible cytokine storm, trending down, continue to monitor. 8. Septic shock developed during the admission with lactic acidosis, due to COVID 19 pneumonia, on pressors, still on steroids. 9.  Goals of care, full code, poor prognosis. NOTE: This report was transcribed using voice recognition software. Every effort was made to ensure accuracy; however, inadvertent computerized transcription errors may be present.   Electronically signed by Jeannine Redman MD on 11/27/2020 at 8:49 AM

## 2020-11-27 NOTE — PROGRESS NOTES
Critical Care Team - Daily Progress Note         Date and time: 11/27/2020 6:24 PM  Patient's name:  Esvin Bryant Record Number: [id number]  Patient's account/billing number: [de-identified]  Patient's YOB: 1955  Age: 77 y.o. Date of Admission: 11/11/2020  2:12 PM  Length of stay during current admission: 16      Primary Care Physician: No primary care provider on file. ICU Attending Physician: Dr. Katherleen Skiff Status: Full Code    Reason for ICU admission: Acute hypoxic respiratory failure secondary to COVID-19 pneumonia      SUBJECTIVE:     BRIEF HISTORY: History is gathered from medical records, patient was in respiratory distress on BiPAP, emergently being intubated on arrival in the ICU.     Lalo pearce 65 y.o. arrived to the ED 11/11 with fever, nausea, vomiting.  He reports the symptoms began 6 days prior. Loss of appetite red green-white productive sputum, urinary frequency, diarrhea rhinorrhea, nasal congestion chest pain right upper quadrant abdominal pain.  He had been on a Z-Michale from his primary physician no improvement in symptoms he complains of right sided pleuritic chest pain with cough.  ABG within normal range, leukocytosis, lymphopenia elevated D-dimer greater than 5250 he was started on heparin drip, ferritin 1710 and admitted await ID consult for treatment will need remdesivir, infectious work-up.  He arrived hypoxic was placed on 15 L nonrebreather he denies history of PEs or DVTs      Patient was admitted to the hospital, was on BiPAP, OptiFlow. On 11/15/2020, patient was unable to obtain O2 sat above upper 80s, was in respiratory distress with respiratory rate of 40-50/min. Because of his increasing respiratory distress on BiPAP, patient was transferred to ICU, intubated. He has been followed by pulmonology, infectious disease throughout hospital stay.      OVERNIGHT EVENTS:    11/16/20: Some soft BPs in the evening after being proned, Silas-Synephrine started. 11/17/20: Patient supine. Will attempt 6 hours and switch back to prone. 11/18/20: Patient prone overnight. Sit to supine this morning. Tolerated supine throughout the day. Put back to prone for 1 more night. 11/19/20: Patient switched to supine. Will repeat gas. Off of pressors. 11/20/20: Tolerated parenteral day. Became agitated overnight and propofol was started. .  11/21/20: Needing pressors overnight. O2 saturation good and less moving patient. With any movement or agitation, O2 saturation drops. Back on 3 sedatives overnight. leukocytosis, infectious work-up. 11/22/20: Agitated overnight. Attempt to stand up out of bed, he had feet on the ground. Nursing put him back in bed. He then calmed. Patient again became agitated with tachycardia and hypertension. He is going for Versed. Resting comfortably in bed. 11/23/20; still agitated overnight. Attempted to stand up again. Restarted back on paralytics. 11/24/20:  Wean fio2 and vent as able. Off pressors  11/25/20: back on pressors, art line tip growing staph. Start stress dose steroids  11/26/20: still requiring pressors. Became hypoxic again despite maxed vent setting, reparalyzed  11/27/20: still on paralytic, tolerating supine.  Weaning pressors, afebrile    CURRENT VENTILATION STATUS:     [x] Ventilator  [] BIPAP  [] Nasal Cannula [] Room Air        SECRETIONS : Amount:  [x] Small [] Moderate  [] Large  [] None  Color:     [] White [] Colored  [] Bloody    SEDATION:    [x] Propofol gtt  [x] Versed gtt  [] Ativan gtt   [] No Sedation    PARALYZED:  [] No    [x] Yes      VASOPRESSORS:  [] No    [x] Yes    If yes -   [x] Levophed       [] Dopamine     [] Vasopressin       [] Dobutamine  [] Phenylephrine         [] Epinephrine    CENTRAL LINES:     [] No   [x] Yes   (Date of Insertion:  PICC)           If yes -     [x] Right IJ     [] Left IJ [] Right Femoral [] Left Femoral                   [] Right Subclavian [] Left Subclavian       HANSON'S CATHETER:   [] No   [x] Yes  (Date of Insertion:  11/15 )     URINE OUTPUT:            [x] Good   [] Low              [] Anuric      OBJECTIVE:     VITAL SIGNS:  BP (!) 114/54   Pulse 97   Temp 97.4 °F (36.3 °C) (Bladder)   Resp 27   Ht 5' 8\" (1.727 m)   Wt 239 lb 10.2 oz (108.7 kg)   SpO2 92%   BMI 36.44 kg/m²   Tmax over 24 hours:  Temp (24hrs), Av °F (36.1 °C), Min:95.9 °F (35.5 °C), Max:97.4 °F (36.3 °C)      Patient Vitals for the past 6 hrs:   Temp Temp src Pulse Resp SpO2   20 1800 -- -- 97 27 92 %   20 1700 -- -- 92 (!) 31 94 %   20 1613 -- -- 91 29 93 %   20 1600 97.4 °F (36.3 °C) Bladder 92 29 93 %   20 1500 -- -- 94 29 93 %   20 1417 -- -- 90 30 94 %   20 1400 -- -- 92 29 94 %   20 1300 -- -- 98 29 91 %         Intake/Output Summary (Last 24 hours) at 2020 1824  Last data filed at 2020 1600  Gross per 24 hour   Intake 3240. 85 ml   Output 3900 ml   Net -659.15 ml     Wt Readings from Last 2 Encounters:   20 239 lb 10.2 oz (108.7 kg)     Body mass index is 36.44 kg/m². PHYSICAL EXAMINATION:  General: Intubated, sedated, supine  Eyes: conjunctivae/corneas clear, sclera non icteric  Ears: no obvious scars, no lesions, no masses  Mouth: mucous membranes somewhat dry, no obvious oral sores.   ET tube in place  Head: normocephalic, atraumatic  Neck: no JVD, trachea midline  Lungs: Diminished bilaterally  Heart: tachycardic, regular rhythm, no murmur, normal S1, S2  Abdomen: soft, non-tender; bowel sounds normal; no masses, no organomegaly  Extremities: mild 1+ pitting edema b/l upper and lower extremities, extremities atraumatic, no cyanosis, no clubbing, 2+ pedal pulses palpated  Skin: normal color, normal texture, normal turgor, no rashes, no lesions  Neurologic: unable to assess   MEDICATIONS:    Scheduled Meds:   dornase alpha  2.5 mg Inhalation Daily    albuterol  2.5 mg Nebulization Q6H    acetylcysteine  600 mg Inhalation BID    fluconazole  400 mg Intravenous Q24H    hydrocortisone sodium succinate PF  100 mg Intravenous Q8H    QUEtiapine  25 mg Oral BID    divalproex  500 mg Oral 2 times per day    insulin glargine  20 Units Subcutaneous Nightly    oxyCODONE  10 mg Oral Q6H    heparin flush  3 mL Intravenous 2 times per day    insulin lispro  0-18 Units Subcutaneous Q6H    midodrine  10 mg Oral TID WC    pantoprazole  40 mg Intravenous Daily    And    sodium chloride (PF)  10 mL Intravenous Daily    enoxaparin  110 mg Subcutaneous BID    chlorhexidine  15 mL Mouth/Throat BID    influenza virus vaccine  0.5 mL Intramuscular Prior to discharge    sodium chloride flush  10 mL Intravenous 2 times per day    vitamin C  1,000 mg Oral BID    zinc sulfate  50 mg Oral BID    vitamin D3  400 Units Oral Daily     Continuous Infusions:   cisatracurium (NIMBEX) infusion 2.5 mcg/kg/min (11/27/20 1221)    midazolam 8 mg/hr (11/27/20 1221)    fentaNYL 5 mcg/ml in 0.9%  ml infusion 200 mcg/hr (11/27/20 1616)    norepinephrine 6 mcg/min (11/27/20 0856)    propofol 30 mcg/kg/min (11/27/20 1221)    dextrose       PRN Meds:   magnesium sulfate, 1 g, PRN  potassium chloride, 20 mEq, PRN  sodium phosphate IVPB, 0.16 mmol/kg, PRN    Or  sodium phosphate IVPB, 0.32 mmol/kg, PRN  sodium chloride flush, 10 mL, PRN  heparin flush, 3 mL, PRN  glucose, 15 g, PRN  dextrose, 12.5 g, PRN  glucagon (rDNA), 1 mg, PRN  dextrose, 100 mL/hr, PRN  sodium chloride, 30 mL, PRN  acetaminophen, 650 mg, Q6H PRN    Or  acetaminophen, 650 mg, Q6H PRN  polyethylene glycol, 17 g, Daily PRN  promethazine, 12.5 mg, Q6H PRN    Or  ondansetron, 4 mg, Q6H PRN        VENT SETTINGS (Comprehensive) (if applicable):  Vent Information  $Ventilation: $Subsequent Day  Skin Assessment: Clean, dry, & intact  Equipment ID: [id number]  Equipment Changed: Airway securing device  Vent Type: [id number]  Vent Mode: AC/VC  Vt Ordered: 400 mL  Rate Set: 30 bmp  Peak Flow: 70 L/min  Pressure Support: 0 cmH20  FiO2 : 80 %  SpO2: 92 %  SpO2/FiO2 ratio: 116.25  Sensitivity: 3  PEEP/CPAP: 14  I Time/ I Time %: 0 s  Humidification Source: Heated wire  Humidification Temp: 37  Humidification Temp Measured: 37  Circuit Condensation: Drained  Mask Type: Full face mask  Mask Size: Medium  Additional Respiratory  Assessments  Pulse: 97  Resp: 27  SpO2: 92 %  Position: Semi-Barnett's  Humidification Source: Heated wire  Humidification Temp: 37  Circuit Condensation: Drained  Oral Care: Mouthwash, Mouth swabbed, Mouth suctioned, Teeth brushed  Subglottic Suction Done?: Yes  Cuff Pressure (cm H2O): 29 cm H2O    ABGs:   Recent Labs     11/27/20 0525   PH 7.289*   PCO2 77.2*   PO2 76.5   HCO3 36.2*   BE 7.3*   O2SAT 93.5       Laboratory findings:    Complete Blood Count:   Recent Labs     11/25/20 0525 11/26/20 0530 11/27/20 0512   WBC 11.0 9.8 8.3   HGB 12.4* 11.7* 10.4*   HCT 40.1 37.1 33.4*    152 139        Last 3 Blood Glucose:   Recent Labs     11/25/20 0525 11/26/20 0530 11/27/20 0512   GLUCOSE 157* 213* 196*        PT/INR:    Lab Results   Component Value Date    PROTIME 10.9 02/16/2012    INR 0.9 02/16/2012     PTT:    Lab Results   Component Value Date    APTT 103.3 11/16/2020       Comprehensive Metabolic Profile:   Recent Labs     11/25/20 0525 11/26/20 0530 11/27/20 0512    142 141   K 5.2* 4.8 4.5   CL 99 101 105   CO2 35* 36* 32*   BUN 17 19 16   CREATININE 0.8 0.7 0.8   GLUCOSE 157* 213* 196*   CALCIUM 9.0 8.7 7.7*   PROT 5.8* 5.4* 4.7*   LABALBU 2.9* 2.9* 2.5*   BILITOT 0.4 0.5 0.3   ALKPHOS 85 78 64   AST 29 33 29   ALT 37 40 39      Magnesium:   Lab Results   Component Value Date    MG 2.0 11/27/2020     Phosphorus:   Lab Results   Component Value Date    PHOS 3.5 11/27/2020     Ionized Calcium: No results found for: CAION       Troponin: No results for input(s): TROPONINI in the last 72 hours.     Microbiology:  Cultures during this admission:     Blood cultures:                 [] None drawn      [] Negative             [x]  Positive (Details: Coag negative staph)  Urine Culture:                   [x] None drawn      [] Negative             []  Positive (Details:  )  Sputum Culture:               [] None drawn       [] Negative             [x]  Positive (Details: repeat sent today 11/21 pending)  Endotracheal aspirate:     [] None drawn       [] Negative             [x]  Positive (Candida)  Other pertinent Labs: Strep and legionella ag negative  Art line tip-- staph aureus    Radiology/Imaging:     Chest x-ray:  11/27/20  Impression    1. No interval change in extensive bilateral multifocal pulmonary infiltrates.                   ASSESSMENT:     Active Problems:    Hypoxia    COVID-19    Hypoxemia    Palliative care by specialist    Counseling regarding advance care planning and goals of care  Resolved Problems:    * No resolved hospital problems. *      Additional assessment:    COVID-19 pneumonia  Acute Respiratory failure secondary to COVID-19 pneumonia  Community acquired bacterial pneumonia  Hypotension -resolved  Hyperglycemia  Septic shock        PLAN:     WEAN PER PROTOCOL:  [] No   [x] Yes  [] N/A    DISCONTINUE ANY LABS:   [x] No   [] Yes    ICU PROPHYLAXIS:  Stress ulcer:  [x] PPI Agent  [] [id number] [] Sucralfate  [] Other:  VTE:  [x] Enoxaparin  [] Unfract. Heparin Subcut  [] EPC Cuffs    NUTRITION:  [] NPO [] Tube Feeding (Specify: ) [] TPN  [x] PO (Diet: DIET TUBE FEED CONTINUOUS/CYCLIC NPO; Semi-elemental; Orogastric; Continuous; 20; 65  Diet Tube Feed Modular: Protein Modular)    HOME MEDICATIONS RECONCILED: [] No  [x] Yes    INSULIN DRIP:   [x] No   [] Yes    CONSULTATION NEEDED:  [x] No   [] Yes    FAMILY UPDATED:    [] No   [x] Yes    TRANSFER OUT OF ICU:   [x] No   [] Yes    ADDITIONAL PLAN:  SYSTEMS ASSESSMENT     Neuro   Intubated, sedated and paralyzed   -Increased agitation. Continue fentanyl, Versed, propofol.   Add Depakote. And wean as able   - nimbex     Respiratory   Acute hypoxic respiratory failure secondary to COVID-19 pneumonia  -Was initially on BiPAP, was intubated 11/15  -ID following  -levofloxacin--finished for presumed community-acquired pneumonia superimposed on COVID-19 PNA  -Patient tolerating supine. Wean FiO2 and PEEP as able. Keep plateau pressures less than 30.  -Repeat respiratory culture + candida --> fluconazole  -wean fio2  -coming down on fio2, 80 down from 90. P/F ratio mildly improving  -will need trach/peg    Superimposed PNA-completed 7 days of Levaquin on 11/18     Cardiovascular   Shock, septic   -CLABSI, art line growning staph and resp candida   -vanco and fluconazole   -add solucortef   -Levo as needed for MAP greater than 65   -LAKIA pending   -NICOM not fluid responsive    Prolonged qtc     Gastrointestinal   Tube feeds     Renal   Hypernatremia-resolved  Hyperkalemia- resolved     Infectious Disease   COVID-19 pneumonia   -On Decadron--finished. remdesivir-finished   -ARDS protocol   -wean as able   -repeat covid positive    Superimposed community-acquired pneumonia,   - followed by ID, finish 7 days of Levaquin    Gram + cocci bacteremia-- CLABSI   -vacnco finished   -repeat cx pending   -art line tip growing staph   -LAKIA   -Respiratory culture + Candida-- fluconazol   -PICC     Hematology/Oncology   Hypercoagulability secondary to Covid pneumonia   -continue therapeutic lovenox     Endocrine   Hyperglycemia related to steroids -high-dose sliding scale insulin, Lantus 20 nightly     Social/Spiritual/DNR/Other   Full code  Palliative care consult      Diet: DIET TUBE FEED CONTINUOUS/CYCLIC NPO; Semi-elemental; Orogastric; Continuous; 20; 65  Diet Tube Feed Modular: Protein Modular  CODE Status: Full Code    Dispo: Maintain ICU level of care        Shayne Faulkner DO  Resident, PGY-2  11/27/2020  6:24 PM     I personally saw, examined and provided care for the patient.  Radiographs, labs and medication list were reviewed by me independently. I spoke with bedside nursing, therapists and consultants. Critical care services and times documented are independent of procedures and multidisciplinary rounds with Residents. Additionally comprehensive, multidisciplinary rounds were conducted with the MICU team. The case was discussed in detail and plans for care were established. Review of Residents documentation was conducted and revisions were made as appropriate. I agree with the above documented exam, problem list and plan of care. Poor prognosis     Ynes Rodriguez M.D.    Pulmonary/Critical Care Medicine   42 min cct excluding procedures

## 2020-11-28 NOTE — PROGRESS NOTES
Critical Care Team - Daily Progress Note         Date and time: 11/28/2020 6:59 AM  Patient's name:  Sha Sen  Medical Record Number: [id number]  Patient's account/billing number: [de-identified]  Patient's YOB: 1955  Age: 77 y.o. Date of Admission: 11/11/2020  2:12 PM  Length of stay during current admission: 17      Primary Care Physician: No primary care provider on file. ICU Attending Physician: Dr. Josefa Davis Status: Full Code    Reason for ICU admission: Acute hypoxic respiratory failure secondary to COVID-19 pneumonia      SUBJECTIVE:     BRIEF HISTORY: History is gathered from medical records, patient was in respiratory distress on BiPAP, emergently being intubated on arrival in the ICU.     Ghazal pearce 65 y.o. arrived to the ED 11/11 with fever, nausea, vomiting.  He reports the symptoms began 6 days prior. Loss of appetite red green-white productive sputum, urinary frequency, diarrhea rhinorrhea, nasal congestion chest pain right upper quadrant abdominal pain.  He had been on a Z-Michael from his primary physician no improvement in symptoms he complains of right sided pleuritic chest pain with cough.  ABG within normal range, leukocytosis, lymphopenia elevated D-dimer greater than 5250 he was started on heparin drip, ferritin 1710 and admitted await ID consult for treatment will need remdesivir, infectious work-up.  He arrived hypoxic was placed on 15 L nonrebreather he denies history of PEs or DVTs      Patient was admitted to the hospital, was on BiPAP, OptiFlow. On 11/15/2020, patient was unable to obtain O2 sat above upper 80s, was in respiratory distress with respiratory rate of 40-50/min. Because of his increasing respiratory distress on BiPAP, patient was transferred to ICU, intubated. He has been followed by pulmonology, infectious disease throughout hospital stay.      OVERNIGHT EVENTS:    11/16/20: Some soft BPs in the evening after being proned, Silas-Synephrine started. 11/17/20: Patient supine. Will attempt 6 hours and switch back to prone. 11/18/20: Patient prone overnight. Sit to supine this morning. Tolerated supine throughout the day. Put back to prone for 1 more night. 11/19/20: Patient switched to supine. Will repeat gas. Off of pressors. 11/20/20: Tolerated parenteral day. Became agitated overnight and propofol was started. .  11/21/20: Needing pressors overnight. O2 saturation good and less moving patient. With any movement or agitation, O2 saturation drops. Back on 3 sedatives overnight. leukocytosis, infectious work-up. 11/22/20: Agitated overnight. Attempt to stand up out of bed, he had feet on the ground. Nursing put him back in bed. He then calmed. Patient again became agitated with tachycardia and hypertension. He is going for Versed. Resting comfortably in bed. 11/23/20; still agitated overnight. Attempted to stand up again. Restarted back on paralytics. 11/24/20:  Wean fio2 and vent as able. Off pressors  11/25/20: back on pressors, art line tip growing staph. Start stress dose steroids  11/26/20: still requiring pressors. Became hypoxic again despite maxed vent setting, reparalyzed  11/27/20: still on paralytic, tolerating supine. Weaning pressors, afebrile  11/28/20: continues to be off pressors. Wean fio2/peep.     CURRENT VENTILATION STATUS:     [x] Ventilator  [] BIPAP  [] Nasal Cannula [] Room Air        SECRETIONS : Amount:  [x] Small [] Moderate  [] Large  [] None  Color:     [] White [] Colored  [] Bloody    SEDATION:    [x] Propofol gtt  [x] Versed gtt  [] Ativan gtt   [] No Sedation    PARALYZED:  [] No    [x] Yes      VASOPRESSORS:  [x] No    [] Yes    If yes -   [] Levophed       [] Dopamine     [] Vasopressin       [] Dobutamine  [] Phenylephrine         [] Epinephrine    CENTRAL LINES:     [] No   [x] Yes   (Date of Insertion:  PICC)           If yes -     [x] Right IJ     [] Left IJ [] Right Femoral [] Left Femoral                   [] Right Subclavian [] Left Subclavian       HANSON'S CATHETER:   [] No   [x] Yes  (Date of Insertion:  11/15 )     URINE OUTPUT:            [x] Good   [] Low              [] Anuric      OBJECTIVE:     VITAL SIGNS:  BP (!) 111/59   Pulse 97   Temp 98 °F (36.7 °C) (Bladder)   Resp 30   Ht 5' 8\" (1.727 m)   Wt 239 lb 10.2 oz (108.7 kg)   SpO2 92%   BMI 36.44 kg/m²   Tmax over 24 hours:  Temp (24hrs), Av.6 °F (36.4 °C), Min:97.2 °F (36.2 °C), Max:98 °F (36.7 °C)      Patient Vitals for the past 6 hrs:   BP Temp Temp src Pulse Resp SpO2   20 0600 -- -- -- 97 30 92 %   20 0500 -- -- -- 102 29 93 %   20 0400 (!) 111/59 98 °F (36.7 °C) Bladder 96 29 93 %   20 0300 -- -- -- 96 30 93 %   20 0200 -- -- -- 96 30 94 %   20 0100 -- -- -- 97 30 93 %         Intake/Output Summary (Last 24 hours) at 2020 0659  Last data filed at 2020 1600  Gross per 24 hour   Intake 2172.78 ml   Output 1400 ml   Net 772.78 ml     Wt Readings from Last 2 Encounters:   20 239 lb 10.2 oz (108.7 kg)     Body mass index is 36.44 kg/m². PHYSICAL EXAMINATION:  General: Intubated, sedated, supine  Eyes: conjunctivae/corneas clear, sclera non icteric  Ears: no obvious scars, no lesions, no masses  Mouth: mucous membranes somewhat dry, no obvious oral sores.   ET tube in place  Head: normocephalic, atraumatic  Neck: no JVD, trachea midline  Lungs: Diminished bilaterally  Heart: regular rate regular rhythm, no murmur, normal S1, S2  Abdomen: soft, non-tender; bowel sounds normal; no masses, no organomegaly  Extremities: mild 1+ pitting edema b/l upper and lower extremities, extremities atraumatic, no cyanosis, no clubbing, 2+ pedal pulses palpated  Skin: normal color, normal texture, normal turgor, no rashes, no lesions  Neurologic: unable to assess   MEDICATIONS:    Scheduled Meds:   dornase alpha  2.5 mg Inhalation Daily    albuterol  2.5 mg Nebulization Q6H  acetylcysteine  600 mg Inhalation BID    fluconazole  400 mg Intravenous Q24H    hydrocortisone sodium succinate PF  100 mg Intravenous Q8H    QUEtiapine  25 mg Oral BID    divalproex  500 mg Oral 2 times per day    insulin glargine  20 Units Subcutaneous Nightly    oxyCODONE  10 mg Oral Q6H    heparin flush  3 mL Intravenous 2 times per day    insulin lispro  0-18 Units Subcutaneous Q6H    midodrine  10 mg Oral TID WC    pantoprazole  40 mg Intravenous Daily    And    sodium chloride (PF)  10 mL Intravenous Daily    enoxaparin  110 mg Subcutaneous BID    chlorhexidine  15 mL Mouth/Throat BID    influenza virus vaccine  0.5 mL Intramuscular Prior to discharge    sodium chloride flush  10 mL Intravenous 2 times per day    vitamin C  1,000 mg Oral BID    zinc sulfate  50 mg Oral BID    vitamin D3  400 Units Oral Daily     Continuous Infusions:   cisatracurium (NIMBEX) infusion 2.5 mcg/kg/min (11/28/20 0421)    midazolam 8 mg/hr (11/28/20 0305)    fentaNYL 5 mcg/ml in 0.9%  ml infusion 200 mcg/hr (11/28/20 0022)    norepinephrine Stopped (11/27/20 1530)    propofol 30 mcg/kg/min (11/28/20 0418)    dextrose       PRN Meds:   magnesium sulfate, 1 g, PRN  potassium chloride, 20 mEq, PRN  sodium phosphate IVPB, 0.16 mmol/kg, PRN    Or  sodium phosphate IVPB, 0.32 mmol/kg, PRN  sodium chloride flush, 10 mL, PRN  heparin flush, 3 mL, PRN  glucose, 15 g, PRN  dextrose, 12.5 g, PRN  glucagon (rDNA), 1 mg, PRN  dextrose, 100 mL/hr, PRN  sodium chloride, 30 mL, PRN  acetaminophen, 650 mg, Q6H PRN    Or  acetaminophen, 650 mg, Q6H PRN  polyethylene glycol, 17 g, Daily PRN  promethazine, 12.5 mg, Q6H PRN    Or  ondansetron, 4 mg, Q6H PRN        VENT SETTINGS (Comprehensive) (if applicable):  Vent Information  $Ventilation: $Subsequent Day  Skin Assessment: Clean, dry, & intact  Equipment ID: [id number]  Equipment Changed: Airway securing device  Vent Type: [id number]  Vent Mode: AC/VC  Vt Ordered: 400 mL  Rate Set: 30 bmp  Peak Flow: 70 L/min  Pressure Support: 0 cmH20  FiO2 : 90 %  SpO2: 92 %  SpO2/FiO2 ratio: 102.22  Sensitivity: 3  PEEP/CPAP: 14  I Time/ I Time %: 0 s  Humidification Source: Heated wire  Humidification Temp: 37  Humidification Temp Measured: 37  Circuit Condensation: Drained  Mask Type: Full face mask  Mask Size: Medium  Additional Respiratory  Assessments  Pulse: 97  Resp: 30  SpO2: 92 %  Position: Semi-Barnett's  Humidification Source: Heated wire  Humidification Temp: 37  Circuit Condensation: Drained  Oral Care: Mouthwash, Mouth swabbed, Mouth suctioned, Teeth brushed  Subglottic Suction Done?: Yes  Cuff Pressure (cm H2O): 29 cm H2O    ABGs:   Recent Labs     11/28/20  0539   PH 7.306*   PCO2 81.0*   PO2 71.9*   HCO3 39.5*   BE 10.5*   O2SAT 93.2       Laboratory findings:    Complete Blood Count:   Recent Labs     11/26/20 0530 11/27/20  0512 11/28/20  0500   WBC 9.8 8.3 10.2   HGB 11.7* 10.4* 10.4*   HCT 37.1 33.4* 34.0*    139 131        Last 3 Blood Glucose:   Recent Labs     11/26/20 0530 11/27/20  0512 11/28/20  0500   GLUCOSE 213* 196* 127*        PT/INR:    Lab Results   Component Value Date    PROTIME 10.9 02/16/2012    INR 0.9 02/16/2012     PTT:    Lab Results   Component Value Date    APTT 103.3 11/16/2020       Comprehensive Metabolic Profile:   Recent Labs     11/26/20 0530 11/27/20  0512 11/28/20  0500    141 140   K 4.8 4.5 4.3    105 99   CO2 36* 32* 39*   BUN 19 16 17   CREATININE 0.7 0.8 0.7   GLUCOSE 213* 196* 127*   CALCIUM 8.7 7.7* 9.0   PROT 5.4* 4.7* 5.9*   LABALBU 2.9* 2.5* 2.9*   BILITOT 0.5 0.3 0.3   ALKPHOS 78 64 71   AST 33 29 28   ALT 40 39 42*      Magnesium:   Lab Results   Component Value Date    MG 2.3 11/28/2020     Phosphorus:   Lab Results   Component Value Date    PHOS 3.1 11/28/2020     Ionized Calcium: No results found for: CAION       Troponin: No results for input(s): TROPONINI in the last 72 hours.     Microbiology:  Cultures propofol. Add Depakote. And wean as able   - nimbex, consider stopping paralytic soon to see how patient does     Respiratory   Acute hypoxic respiratory failure secondary to COVID-19 pneumonia  -Was initially on BiPAP, was intubated 11/15  -ID following  -levofloxacin--finished for presumed community-acquired pneumonia superimposed on COVID-19 PNA  -Patient tolerating supine. Wean FiO2 and PEEP as able. Keep plateau pressures less than 30.  -Repeat respiratory culture + candida --> fluconazole  -wean fio2  -will need trach/peg when o2/peep requirements improve  -permissive hypercapnia for ARDS low TV protocol     Superimposed PNA-completed 7 days of Levaquin on 11/18     Cardiovascular   Shock, septic--resolved   -CLABSI, art line growning staph and resp candida   -vanco and fluconazole   -add solucortef   -Levo as needed for MAP greater than 65   -LAKIA pending   -NICOM not fluid responsive    Prolonged qtc     Gastrointestinal   Tube feeds     Renal   Hypernatremia-resolved  Hyperkalemia- resolved    Mixed Metabolic alkalosis and respiratory acidosis   -permissive hypercapnia 2/2 low TV ARDS protocol.   -urine electrolytes     Infectious Disease   COVID-19 pneumonia   -On Decadron--finished.  remdesivir-finished   -ARDS protocol   -wean as able   -repeat covid positive    Superimposed community-acquired pneumonia,   - followed by ID, finish 7 days of Levaquin    Gram + cocci bacteremia-- CLABSI   -vacnco finished   -repeat cx pending   -art line tip growing staph   -LAKIA   -Respiratory culture + Candida-- fluconazol   -PICC     Hematology/Oncology   Hypercoagulability secondary to Covid pneumonia   -continue therapeutic lovenox     Endocrine   Hyperglycemia related to steroids -high-dose sliding scale insulin, Lantus 20 nightly     Social/Spiritual/DNR/Other   Full code  Palliative care consult      Diet: DIET TUBE FEED CONTINUOUS/CYCLIC NPO; Semi-elemental; Orogastric; Continuous; 20; 65  Diet Tube Feed Modular: Protein Modular  CODE Status: Full Code    Dispo: Maintain ICU level of care        Segundo Feliciano DO  Resident, PGY-2  11/28/2020  6:59 AM     I personally saw, examined and provided care for the patient. Radiographs, labs and medication list were reviewed by me independently. I spoke with bedside nursing, therapists and consultants. Critical care services and times documented are independent of procedures and multidisciplinary rounds with Residents. Additionally comprehensive, multidisciplinary rounds were conducted with the MICU team. The case was discussed in detail and plans for care were established. Review of Residents documentation was conducted and revisions were made as appropriate. I agree with the above documented exam, problem list and plan of care. Derek Segal M.D.    Pulmonary/Critical Care Medicine   44 min cct excluding procedures

## 2020-11-28 NOTE — PROGRESS NOTES
[address] [place] [address] [facility]  JORDONIDA  Progress Note    SUBJECTIVE:  Chief Complaint   Patient presents with    Shortness of Breath     76% room air at arrival     Fever    Emesis     The patient remains intubated and paralyzed. No major changes. Still requiring high oxygen. Review of systems:  As stated above in the chief complaint, otherwise negative.     Medications:  Scheduled Meds:   dornase alpha  2.5 mg Inhalation Daily    albuterol  2.5 mg Nebulization Q6H    acetylcysteine  600 mg Inhalation BID    fluconazole  400 mg Intravenous Q24H    hydrocortisone sodium succinate PF  100 mg Intravenous Q8H    QUEtiapine  25 mg Oral BID    divalproex  500 mg Oral 2 times per day    insulin glargine  20 Units Subcutaneous Nightly    oxyCODONE  10 mg Oral Q6H    heparin flush  3 mL Intravenous 2 times per day    insulin lispro  0-18 Units Subcutaneous Q6H    midodrine  10 mg Oral TID WC    pantoprazole  40 mg Intravenous Daily    And    sodium chloride (PF)  10 mL Intravenous Daily    enoxaparin  110 mg Subcutaneous BID    chlorhexidine  15 mL Mouth/Throat BID    influenza virus vaccine  0.5 mL Intramuscular Prior to discharge    sodium chloride flush  10 mL Intravenous 2 times per day    vitamin C  1,000 mg Oral BID    zinc sulfate  50 mg Oral BID    vitamin D3  400 Units Oral Daily     Continuous Infusions:   cisatracurium (NIMBEX) infusion 2.5 mcg/kg/min (11/28/20 0421)    midazolam 8 mg/hr (11/28/20 0305)    fentaNYL 5 mcg/ml in 0.9%  ml infusion 200 mcg/hr (11/28/20 0808)    norepinephrine Stopped (11/27/20 1530)    propofol 25 mcg/kg/min (11/28/20 0825)    dextrose       PRN Meds:magnesium sulfate, potassium chloride, sodium phosphate IVPB **OR** sodium phosphate IVPB, sodium chloride flush, heparin flush, glucose, dextrose, glucagon (rDNA), dextrose, sodium chloride, acetaminophen **OR** acetaminophen, polyethylene glycol, promethazine **OR** ondansetron    OBJECTIVE:  BP (!) 111/59   Pulse 109   Temp 99 °F (37.2 °C) (Bladder)   Resp 29   Ht 5' 8\" (1.727 m)   Wt 239 lb 10.2 oz (108.7 kg)   SpO2 93%   BMI 36.44 kg/m²   Temp  Av.1 °F (36.7 °C)  Min: 97.2 °F (36.2 °C)  Max: 99.3 °F (37.4 °C)  Constitutional: The patient is lying in bed. He is intubated, sedated and paralyzed. He is supinated. FiO2 down to 90%. PEEP14. Skin: Dry. No rashes were noted. HEENT: Round pupils. No jaundice. ET tube. OG tube. Neck: Supple to movements. Chest: Good breath sounds bilaterally. No crackles were heard. Cardiovascular: Heart sounds rhythmic and regular. Abdomen: Diminished bowel sounds. Soft to palpation. Extremities: No edema. Lines: Right PICC 2020. Right femoral arterial line 2020.     Laboratory and Tests Review:  Lab Results   Component Value Date    WBC 10.2 2020    WBC 8.3 2020    WBC 9.8 2020    HGB 10.4 (L) 2020    HCT 34.0 (L) 2020    MCV 94.7 2020     2020     Lab Results   Component Value Date    NEUTROABS 7.46 (H) 2020    NEUTROABS 6.66 2020    NEUTROABS 8.24 (H) 2020     No results found for: [facility]  Lab Results   Component Value Date    ALT 42 (H) 2020    AST 28 2020    ALKPHOS 71 2020    BILITOT 0.3 2020     Lab Results   Component Value Date     2020    K 4.3 2020    K 3.7 11/15/2020    CL 99 2020    CO2 39 2020    BUN 17 2020    CREATININE 0.7 2020    CREATININE 0.8 2020    CREATININE 0.7 2020    GFRAA >60 2020    LABGLOM >60 2020    GLUCOSE 127 2020    GLUCOSE 154 2012    PROT 5.9 2020    LABALBU 2.9 2020    CALCIUM 9.0 2020    BILITOT 0.3 2020    ALKPHOS 71 2020    AST 28 2020    ALT 42 2020     Lab Results   Component Value Date    CRP 0.8 (H) 2020    CRP 2.1 (H) 2020    CRP 0.4 2020 Lab Results   Component Value Date    SEDRATE 37 (H) 11/28/2020    SEDRATE 86 (H) 11/12/2020     Radiology:  Chest x-ray shows bilateral interstitial infiltrates    Microbiology:   Streptococcus pneumoniae/Legionella urine Ag: Negative  Blood cultures 11/11/2020: Negative x2  Blood cultures 11/21/2020: CoNS in 1 of 2 sets  Blood cultures 11/22/2020: Negative so far  Respiratory culture 11/17/2020: OP nikita reduced  Respiratory culture 11/21/2020: Candida albicans  Nares screen MRSA: Pending  Arterial line tip culture 11/23/2020: >15 colonies Staphylococcus species    No results for input(s): PROCAL in the last 72 hours. ASSESSMENT:  · Moderate to severe SARS-CoV-2 infection with pneumonia. Completed 5 days of Remdesivir 11/16/2020  · Acute respiratory failure. Ventilator dependent  · Lymphopenia secondary to SARS-CoV-2 infection  · CoNS bacteremia. Probable CLABSI TLC was removed. Repeat cultures are negative. Treated with Vancomycin  · Adrenal crisis mimicking septic shock, improved on steroids  · Fevers associated to the above  · Leukocytosis associated to the above, resolved  · Probable community-acquired pneumonia.   Completed 7 days of Levofloxacin 11/18/2020   · Airway colonization with Candida  · Elevation of transaminases associated to the above, improved    PLAN:  · Continue Fluconazole for Candida overgrowth, day 4  · Anticoagulation  · Stress dose steroids  · Prognosis remains poor  · Patient will need tracheostomy and PEG    Spoke with nursing    Desiree Polanco  11:00 AM  11/28/2020

## 2020-11-28 NOTE — PROGRESS NOTES
m)   Wt 239 lb 10.2 oz (108.7 kg)   SpO2 92%   BMI 36.44 kg/m²     General Appearance: intubated and paralyzed  Skin: warm and dry  Head: normocephalic and atraumatic  Eyes: pupils equal, round, and reactive to light, extraocular eye movements intact, conjunctivae normal  Neck: neck supple and non tender without mass   Pulmonary/Chest: diminished  bilaterally- no wheezes, rales or rhonchi, normal air movement  Cardiovascular: normal rate, prontaed  Extremities: no cyanosis, no clubbing and no edema  Neurologic: intubated and paralyzed        Recent Labs     11/26/20 0530 11/27/20  0512 11/28/20  0500    141 140   K 4.8 4.5 4.3    105 99   CO2 36* 32* 39*   BUN 19 16 17   CREATININE 0.7 0.8 0.7   GLUCOSE 213* 196* 127*   CALCIUM 8.7 7.7* 9.0       Recent Labs     11/26/20 0530 11/27/20 0512 11/28/20  0500   WBC 9.8 8.3 10.2   RBC 4.12 3.61* 3.59*   HGB 11.7* 10.4* 10.4*   HCT 37.1 33.4* 34.0*   MCV 90.0 92.5 94.7   MCH 28.4 28.8 29.0   MCHC 31.5* 31.1* 30.6*   RDW 15.9* 16.2* 16.7*    139 131   MPV 11.7 12.6* 11.5         Assessment:    Active Problems:    Hypoxia    COVID-19    Hypoxemia    Palliative care by specialist    Counseling regarding advance care planning and goals of care  Resolved Problems:    * No resolved hospital problems. *      Plan:  1. Acute hypoxic respiratory failure due to COVID-19 pneumonia, presented with oxygen saturation 76% on room air, intubated and paralyzed, on pressors, currently on 90% FiO2 with O2 sat 92%,  2.  Acute COVID-19 pneumonia, finished Decadron, ID on board, finished 5 days of remdesivir, CRP down to 0.8  3. Superimposed bacterial pneumonia finished 7 days of Levaquin. 4.  CLABSI, central line removed and art line was changed, started per ID on IV vanco.  5.  Suspecting cytokine storm with elevated D-dimer, on full dose anticoagulation.   6.  Acute renal failure, presented with creatinine of 2.4 not sure about the patient's baseline he might have CKD.  Patient received IV fluids, resolved, cr at 0.8  7. Elevated transaminases, most likely due to COVID-19 infection and possible cytokine storm, trending down, continue to monitor. 8. Septic shock developed during the admission with lactic acidosis, due to COVID 19 pneumonia, on pressors, still on steroids. 9.  Goals of care, full code, poor prognosis. NOTE: This report was transcribed using voice recognition software. Every effort was made to ensure accuracy; however, inadvertent computerized transcription errors may be present.   Electronically signed by Lorenzo Gutierres MD on 11/28/2020 at 8:25 AM

## 2020-11-28 NOTE — PROCEDURES
MIDLINE  Catheter insertion date 11/28/2020     Product Number:  [id number]   Lot No: [id number]   Gauge: 18   Lumen: 4.5 fr   L Cephalic    Vein Diameter : 0.45cm   Upper arm circumference (10CM ABOVE AC): 35cm   Catheter Length : 15CM                    Internal Length: 15cm   Exposed Catheter Length: 0cm   Ultrasound Used:yes  : ALEJANDRA Linda RN

## 2020-11-28 NOTE — PLAN OF CARE
Loneliness  Goal: Demonstrate positive use of time alone when socialization is not possible  Outcome: Not Met This Shift     Problem: Fatigue  Goal: Verbalize increase energy and improved vitality  Outcome: Not Met This Shift

## 2020-11-28 NOTE — PROGRESS NOTES
Wife phoned in for updates. Updates and support given. Called her back from room and let her talk to her . She was very grateful.

## 2020-11-28 NOTE — PATIENT CARE CONFERENCE
Intensive Care Daily Quality Rounding Checklist        ICU Team Members:  Dr. Ninfa Villarreal, charge nurse, bedside nurse     ICU Day #: NUMBER: 14     Intubation Date: November 15     Ventilator Day #: NUMBER: 14     Central Line Insertion Kira Uma                                                    Day #: NUMBER: 7      Arterial Line Insertion Date: Shukri Herron                             JXG #: NUMBER: 14     DVT Prophylaxis: Lovenox     GI Prophylaxis: Protonix      Martinez Catheter Insertion Salud Castles                                        Day #: 14                             Continued need (if yes, reason documented and discussed with physician): yes, strict I/O, critically ill pt      Skin Issues/ Wounds and ordered treatment discussed on rounds: SOS precautions      Goals/ Plans for the Day: wean as able, discuss with palliative to discuss code status.

## 2020-11-29 NOTE — PROGRESS NOTES
Palliative Medicine  Social Work Progress Note    Pt Name: Chris Pike    \[facility]\"" contacted pt's spouse, Mrs. Janki Tabor. She is tearful, states that she will be contacting pt's children (5 local, 1 out of town) today to discuss his status and decision-making. She is aware she is NOK decision-maker and states that she would like family involvement but knows that she will make the final decision. During phone call, Mrs. Janki Tabor advised that pt's contact, Kristi Barnhart, is  and asked that they be removed. Mrs. Janki Tabor stated at this time she would like to change pt to [facility]. She states she is aware of this code status from previous experience and feels it is in line with what would be pt's wishes at this time. Mrs. Janki Tabor stated that she is trying to make decision regarding trach placement but is also aware that pt would not be able to undergo trach placement at this time. She remains hopeful that pt may somehow be able to recover from this but also able to accept that he may not. She has support from family and her Holiness. Mrs. Janki Tabor discussed that it has been very difficult not being able to see pt and was extremely grateful for being able to speak with him by phone. Much support provided. Mrs. Janki Tabor has PM contact information, will call with questions or updates and requests that PM call her tomorrow as well.

## 2020-11-29 NOTE — PROGRESS NOTES
[address] [place] [address] [facility]  Progress Note    SUBJECTIVE:  Chief Complaint   Patient presents with    Shortness of Breath     76% room air at arrival     Fever    Emesis     No major changes. The patient is still in the ICU. He is still intubated, sedated and paralyzed. Review of systems:  As stated above in the chief complaint, otherwise negative.     Medications:  Scheduled Meds:   lidocaine  5 mL Intradermal Once    heparin flush  1 mL Intravenous 2 times per day    midodrine  10 mg Oral Q8H    dornase alpha  2.5 mg Inhalation Daily    albuterol  2.5 mg Nebulization Q6H    acetylcysteine  600 mg Inhalation BID    fluconazole  400 mg Intravenous Q24H    hydrocortisone sodium succinate PF  100 mg Intravenous Q8H    QUEtiapine  25 mg Oral BID    divalproex  500 mg Oral 2 times per day    insulin glargine  20 Units Subcutaneous Nightly    oxyCODONE  10 mg Oral Q6H    heparin flush  3 mL Intravenous 2 times per day    insulin lispro  0-18 Units Subcutaneous Q6H    pantoprazole  40 mg Intravenous Daily    And    sodium chloride (PF)  10 mL Intravenous Daily    enoxaparin  110 mg Subcutaneous BID    chlorhexidine  15 mL Mouth/Throat BID    influenza virus vaccine  0.5 mL Intramuscular Prior to discharge    sodium chloride flush  10 mL Intravenous 2 times per day    vitamin C  1,000 mg Oral BID    zinc sulfate  50 mg Oral BID    vitamin D3  400 Units Oral Daily     Continuous Infusions:   cisatracurium (NIMBEX) infusion 2 mcg/kg/min (11/29/20 9760)    midazolam 7 mg/hr (11/29/20 0649)    fentaNYL 5 mcg/ml in 0.9%  ml infusion 200 mcg/hr (11/29/20 0650)    norepinephrine Stopped (11/27/20 1530)    propofol 25 mcg/kg/min (11/29/20 0800)    dextrose       PRN Meds:sodium chloride flush, heparin flush, magnesium sulfate, potassium chloride, sodium phosphate IVPB **OR** sodium phosphate IVPB, sodium chloride flush, heparin flush, glucose, dextrose, glucagon (rDNA), dextrose, sodium chloride, acetaminophen **OR** acetaminophen, polyethylene glycol, promethazine **OR** ondansetron    OBJECTIVE:  /60   Pulse 86   Temp 98 °F (36.7 °C) (Bladder)   Resp 30   Ht 5' 8\" (1.727 m)   Wt 239 lb 10.2 oz (108.7 kg)   SpO2 93%   BMI 36.44 kg/m²   Temp  Av.9 °F (36.6 °C)  Min: 97.2 °F (36.2 °C)  Max: 99 °F (37.2 °C)  Constitutional: The patient is lying in bed. He is intubated, sedated and paralyzed. He is supinated. FiO2 down to 9 80%. PEEP14. Skin: Dry. No rashes were noted. HEENT: Round pupils. No jaundice. ET tube. OG tube. Neck: Supple to movements. Chest: Good breath sounds bilaterally. No crackles were heard. Cardiovascular: Heart sounds rhythmic and regular. Abdomen: Diminished bowel sounds. Soft to palpation. Extremities: No edema. Lines: Right PICC 2020. Right femoral arterial line 2020. Left midline 2020.     Laboratory and Tests Review:  Lab Results   Component Value Date    WBC 8.9 2020    WBC 10.2 2020    WBC 8.3 2020    HGB 9.2 (L) 2020    HCT 29.7 (L) 2020    MCV 94.3 2020     (L) 2020     Lab Results   Component Value Date    NEUTROABS 6.56 2020    NEUTROABS 7.46 (H) 2020    NEUTROABS 6.66 2020     No results found for: [facility]  Lab Results   Component Value Date    ALT 40 2020    AST 25 2020    ALKPHOS 72 2020    BILITOT 0.3 2020     Lab Results   Component Value Date     2020    K 5.1 2020    K 3.7 11/15/2020     2020    CO2 42 2020    BUN 18 2020    CREATININE 0.7 2020    CREATININE 0.7 2020    CREATININE 0.8 2020    GFRAA >60 2020    LABGLOM >60 2020    GLUCOSE 123 2020    GLUCOSE 154 2012    PROT 5.5 2020    LABALBU 3.4 2020    CALCIUM 8.9 2020    BILITOT 0.3 2020    ALKPHOS 72 2020    AST 25 2020 ALT 40 11/29/2020     Lab Results   Component Value Date    CRP 0.8 (H) 11/27/2020    CRP 2.1 (H) 11/25/2020    CRP 0.4 11/23/2020     Lab Results   Component Value Date    SEDRATE 43 (H) 11/28/2020    SEDRATE 86 (H) 11/12/2020     Radiology:  Chest x-ray shows bilateral interstitial infiltrates    Microbiology:   Streptococcus pneumoniae/Legionella urine Ag: Negative  Blood cultures 11/11/2020: Negative x2  Blood cultures 11/21/2020: CoNS in 1 of 2 sets  Blood cultures 11/22/2020: Negative x2  Respiratory culture 11/17/2020: OP nikita reduced  Respiratory culture 11/21/2020: Candida albicans  Nares screen MRSA: Pending  Arterial line tip culture 11/23/2020: >15 colonies Staphylococcus species    No results for input(s): PROCAL in the last 72 hours. ASSESSMENT:  · Moderate to severe SARS-CoV-2 infection with pneumonia. Completed 5 days of Remdesivir 11/16/2020  · Acute respiratory failure. Ventilator dependent  · Lymphopenia secondary to SARS-CoV-2 infection  · CoNS bacteremia. Probable CLABSI. TLC was removed. Repeat cultures are negative.   Treated with Vancomycin  · Adrenal crisis mimicking septic shock, improved on steroids  · Fevers associated to the above, resolved  · Airway colonization with Candida  · Elevation of transaminases associated to the above, improved    PLAN:  · Continue Fluconazole for Candida overgrowth, day 5  · Anticoagulation  · Stress dose steroids  · Prognosis remains poor  · Patient will need tracheostomy and PEG    Spoke with nursing    Ace Lopez  8:46 AM  11/29/2020

## 2020-11-29 NOTE — PROGRESS NOTES
[facility] Progress Note    Admitting Date and Time: 11/11/2020  2:12 PM  Admit Dx: Hypoxia [R09.02]  Hypoxia [R09.02]    Subjective:  Patient is being followed for Hypoxia [R09.02]  Hypoxia [R09.02]   Pt is intubated and paralyzed, worsening oxygenation, back on 90% FiO2 with 92%% sats, off pressors. ROS: denies fever, chills, cp, n/v, HA unless stated above.       lidocaine  5 mL Intradermal Once    heparin flush  1 mL Intravenous 2 times per day    midodrine  10 mg Oral Q8H    dornase alpha  2.5 mg Inhalation Daily    albuterol  2.5 mg Nebulization Q6H    acetylcysteine  600 mg Inhalation BID    fluconazole  400 mg Intravenous Q24H    hydrocortisone sodium succinate PF  100 mg Intravenous Q8H    QUEtiapine  25 mg Oral BID    divalproex  500 mg Oral 2 times per day    insulin glargine  20 Units Subcutaneous Nightly    oxyCODONE  10 mg Oral Q6H    heparin flush  3 mL Intravenous 2 times per day    insulin lispro  0-18 Units Subcutaneous Q6H    pantoprazole  40 mg Intravenous Daily    And    sodium chloride (PF)  10 mL Intravenous Daily    enoxaparin  110 mg Subcutaneous BID    chlorhexidine  15 mL Mouth/Throat BID    influenza virus vaccine  0.5 mL Intramuscular Prior to discharge    sodium chloride flush  10 mL Intravenous 2 times per day    vitamin C  1,000 mg Oral BID    zinc sulfate  50 mg Oral BID    vitamin D3  400 Units Oral Daily     sodium chloride flush, 10 mL, PRN  heparin flush, 1 mL, PRN  magnesium sulfate, 1 g, PRN  potassium chloride, 20 mEq, PRN  sodium phosphate IVPB, 0.16 mmol/kg, PRN    Or  sodium phosphate IVPB, 0.32 mmol/kg, PRN  sodium chloride flush, 10 mL, PRN  heparin flush, 3 mL, PRN  glucose, 15 g, PRN  dextrose, 12.5 g, PRN  glucagon (rDNA), 1 mg, PRN  dextrose, 100 mL/hr, PRN  sodium chloride, 30 mL, PRN  acetaminophen, 650 mg, Q6H PRN    Or  acetaminophen, 650 mg, Q6H PRN  polyethylene glycol, 17 g, Daily PRN  promethazine, 12.5 mg, Q6H PRN    Or  ondansetron, 4 mg, Q6H PRN         Objective:    /60   Pulse 86   Temp 98 °F (36.7 °C) (Bladder)   Resp 30   Ht 5' 8\" (1.727 m)   Wt 239 lb 10.2 oz (108.7 kg)   SpO2 93%   BMI 36.44 kg/m²     General Appearance: intubated and paralyzed  Skin: warm and dry  Head: normocephalic and atraumatic  Eyes: pupils equal, round, and reactive to light, extraocular eye movements intact, conjunctivae normal  Neck: neck supple and non tender without mass   Pulmonary/Chest: diminished  bilaterally- no wheezes, rales or rhonchi, normal air movement  Cardiovascular: normal rate, prontaed  Extremities: no cyanosis, no clubbing and no edema  Neurologic: intubated and paralyzed        Recent Labs     11/27/20  0512 11/28/20  0500 11/29/20  0530    140 144   K 4.5 4.3 5.1*    99 100   CO2 32* 39* 42*   BUN 16 17 18   CREATININE 0.8 0.7 0.7   GLUCOSE 196* 127* 123*   CALCIUM 7.7* 9.0 8.9       Recent Labs     11/27/20  0512 11/28/20  0500 11/29/20  0617   WBC 8.3 10.2 8.9   RBC 3.61* 3.59* 3.15*   HGB 10.4* 10.4* 9.2*   HCT 33.4* 34.0* 29.7*   MCV 92.5 94.7 94.3   MCH 28.8 29.0 29.2   MCHC 31.1* 30.6* 31.0*   RDW 16.2* 16.7* 17.2*    131 106*   MPV 12.6* 11.5 10.8         Assessment:    Active Problems:    Hypoxia    COVID-19    Hypoxemia    Palliative care by specialist    Counseling regarding advance care planning and goals of care  Resolved Problems:    * No resolved hospital problems. *      Plan:  1. Acute hypoxic respiratory failure due to COVID-19 pneumonia, presented with oxygen saturation 76% on room air, intubated and paralyzed, on pressors, currently on 90% FiO2 with O2 sat 92%,  2.  Acute COVID-19 pneumonia, finished Decadron, ID on board, finished 5 days of remdesivir, CRP down to 0.8  3. Superimposed bacterial pneumonia finished 7 days of Levaquin. 4.  CLABSI, central line removed and art line was changed, started per ID on IV vanco. Repeated cultures are negative  5.   Suspecting cytokine storm with elevated D-dimer, on full dose anticoagulation. 6.  Acute renal failure, presented with creatinine of 2.4 not sure about the patient's baseline he might have CKD. Patient received IV fluids, resolved, cr at 0.8  7. Elevated transaminases, most likely due to COVID-19 infection and possible cytokine storm, trending down, continue to monitor. 8. Septic shock developed during the admission with lactic acidosis, due to COVID 19 pneumonia, on pressors, still on steroids. 9.  Airway colonization with candida, on fluconazole  10. Goals of care, full code, poor prognosis. NOTE: This report was transcribed using voice recognition software. Every effort was made to ensure accuracy; however, inadvertent computerized transcription errors may be present.   Electronically signed by Sanaz Clements MD on 11/29/2020 at 7:48 AM

## 2020-11-29 NOTE — PROGRESS NOTES
Critical Care Team - Daily Progress Note         Date and time: 11/29/2020 4:06 PM  Patient's name:  Esvin Bryant Record Number: [id number]  Patient's account/billing number: [de-identified]  Patient's YOB: 1955  Age: 77 y.o. Date of Admission: 11/11/2020  2:12 PM  Length of stay during current admission: 18      Primary Care Physician: No primary care provider on file. ICU Attending Physician: Dr. Gilmar Warner    Code Status: DNR-CCA    Reason for ICU admission: Acute hypoxic respiratory failure secondary to COVID-19 pneumonia      SUBJECTIVE:     BRIEF HISTORY: History is gathered from medical records, patient was in respiratory distress on BiPAP, emergently being intubated on arrival in the ICU.     Lalo pearce 65 y.o. arrived to the ED 11/11 with fever, nausea, vomiting.  He reports the symptoms began 6 days prior. Loss of appetite red green-white productive sputum, urinary frequency, diarrhea rhinorrhea, nasal congestion chest pain right upper quadrant abdominal pain.  He had been on a Z-Michael from his primary physician no improvement in symptoms he complains of right sided pleuritic chest pain with cough.  ABG within normal range, leukocytosis, lymphopenia elevated D-dimer greater than 5250 he was started on heparin drip, ferritin 1710 and admitted await ID consult for treatment will need remdesivir, infectious work-up.  He arrived hypoxic was placed on 15 L nonrebreather he denies history of PEs or DVTs      Patient was admitted to the hospital, was on BiPAP, OptiFlow. On 11/15/2020, patient was unable to obtain O2 sat above upper 80s, was in respiratory distress with respiratory rate of 40-50/min. Because of his increasing respiratory distress on BiPAP, patient was transferred to ICU, intubated. He has been followed by pulmonology, infectious disease throughout hospital stay.      OVERNIGHT EVENTS:    11/16/20: Some soft BPs in the evening after being proned, Silas-Synephrine started. 11/17/20: Patient supine. Will attempt 6 hours and switch back to prone. 11/18/20: Patient prone overnight. Sit to supine this morning. Tolerated supine throughout the day. Put back to prone for 1 more night. 11/19/20: Patient switched to supine. Will repeat gas. Off of pressors. 11/20/20: Tolerated parenteral day. Became agitated overnight and propofol was started. .  11/21/20: Needing pressors overnight. O2 saturation good and less moving patient. With any movement or agitation, O2 saturation drops. Back on 3 sedatives overnight. leukocytosis, infectious work-up. 11/22/20: Agitated overnight. Attempt to stand up out of bed, he had feet on the ground. Nursing put him back in bed. He then calmed. Patient again became agitated with tachycardia and hypertension. He is going for Versed. Resting comfortably in bed. 11/23/20; still agitated overnight. Attempted to stand up again. Restarted back on paralytics. 11/24/20:  Wean fio2 and vent as able. Off pressors  11/25/20: back on pressors, art line tip growing staph. Start stress dose steroids  11/26/20: still requiring pressors. Became hypoxic again despite maxed vent setting, reparalyzed  11/27/20: still on paralytic, tolerating supine. Weaning pressors, afebrile  11/28/20: continues to be off pressors. Wean fio2/peep. 11/29: Mild hyperkalemia this morning, mild anemia. On recheck potassium was normal and hemoglobin was stable. Palliative care discussed with family, patient now DNR CCA.       CURRENT VENTILATION STATUS:     [x] Ventilator  [] BIPAP  [] Nasal Cannula [] Room Air        SECRETIONS : Amount:  [x] Small [] Moderate  [] Large  [] None  Color:     [] White [] Colored  [] Bloody    SEDATION:    [x] Propofol gtt  [x] Versed gtt  [] Ativan gtt   [] No Sedation    PARALYZED:  [] No    [x] Yes      VASOPRESSORS:  [x] No    [] Yes    If yes -   [] Levophed       [] Dopamine     [] Vasopressin       [] Dobutamine  [] Phenylephrine         [] Epinephrine    CENTRAL LINES:     [] No   [x] Yes   (Date of Insertion:  PICC)           If yes -     [x] Right IJ     [] Left IJ [] Right Femoral [] Left Femoral                   [] Right Subclavian [] Left Subclavian       HANSON'S CATHETER:   [] No   [x] Yes  (Date of Insertion:  11/15 )     URINE OUTPUT:            [x] Good   [] Low              [] Anuric      OBJECTIVE:     VITAL SIGNS:  /60   Pulse 106   Temp 99.9 °F (37.7 °C) (Bladder)   Resp (!) 31   Ht 5' 8\" (1.727 m)   Wt 239 lb 10.2 oz (108.7 kg)   SpO2 (!) 89%   BMI 36.44 kg/m²   Tmax over 24 hours:  Temp (24hrs), Av.4 °F (36.9 °C), Min:97.2 °F (36.2 °C), Max:99.9 °F (37.7 °C)      Patient Vitals for the past 6 hrs:   Temp Temp src Pulse Resp SpO2   20 1500 -- -- 106 (!) 31 (!) 89 %   20 1400 -- -- 106 28 90 %   20 1300 -- -- 102 28 91 %   20 1228 -- -- 94 27 91 %   20 1227 -- -- -- 25 91 %   20 1226 -- -- -- (!) 31 91 %   20 1218 -- -- 87 28 93 %   20 1200 99.9 °F (37.7 °C) Bladder 93 28 93 %   20 1100 -- -- 95 22 92 %         Intake/Output Summary (Last 24 hours) at 2020 1606  Last data filed at 2020 1459  Gross per 24 hour   Intake 5214.44 ml   Output 4450 ml   Net 764.44 ml     Wt Readings from Last 2 Encounters:   20 239 lb 10.2 oz (108.7 kg)     Body mass index is 36.44 kg/m². PHYSICAL EXAMINATION:  General: Intubated, sedated, supine  Eyes: conjunctivae/corneas clear, sclera non icteric  Ears: no obvious scars, no lesions, no masses  Mouth: mucous membranes somewhat dry, no obvious oral sores.   ET tube in place  Head: normocephalic, atraumatic  Neck: no JVD, trachea midline  Lungs: Diminished bilaterally  Heart: regular rate regular rhythm, no murmur, normal S1, S2  Abdomen: soft, non-tender; bowel sounds normal; no masses, no organomegaly  Extremities: mild 1+ pitting edema b/l upper and lower extremities, extremities atraumatic, no cyanosis, no clubbing, 2+ pedal pulses palpated  Skin: normal color, normal texture, normal turgor, no rashes, no lesions  Neurologic: unable to assess   MEDICATIONS:    Scheduled Meds:   lidocaine  5 mL Intradermal Once    heparin flush  1 mL Intravenous 2 times per day    midodrine  10 mg Oral Q8H    dornase alpha  2.5 mg Inhalation Daily    albuterol  2.5 mg Nebulization Q6H    acetylcysteine  600 mg Inhalation BID    fluconazole  400 mg Intravenous Q24H    hydrocortisone sodium succinate PF  100 mg Intravenous Q8H    QUEtiapine  25 mg Oral BID    divalproex  500 mg Oral 2 times per day    insulin glargine  20 Units Subcutaneous Nightly    oxyCODONE  10 mg Oral Q6H    heparin flush  3 mL Intravenous 2 times per day    insulin lispro  0-18 Units Subcutaneous Q6H    pantoprazole  40 mg Intravenous Daily    And    sodium chloride (PF)  10 mL Intravenous Daily    enoxaparin  110 mg Subcutaneous BID    chlorhexidine  15 mL Mouth/Throat BID    influenza virus vaccine  0.5 mL Intramuscular Prior to discharge    sodium chloride flush  10 mL Intravenous 2 times per day    vitamin C  1,000 mg Oral BID    zinc sulfate  50 mg Oral BID    vitamin D3  400 Units Oral Daily     Continuous Infusions:   cisatracurium (NIMBEX) infusion 2 mcg/kg/min (11/29/20 9227)    midazolam 7 mg/hr (11/29/20 0649)    fentaNYL 5 mcg/ml in 0.9%  ml infusion 200 mcg/hr (11/29/20 1435)    norepinephrine Stopped (11/27/20 1530)    propofol 30 mcg/kg/min (11/29/20 1445)    dextrose       PRN Meds:   sodium chloride flush, 10 mL, PRN  heparin flush, 1 mL, PRN  magnesium sulfate, 1 g, PRN  potassium chloride, 20 mEq, PRN  sodium phosphate IVPB, 0.16 mmol/kg, PRN    Or  sodium phosphate IVPB, 0.32 mmol/kg, PRN  sodium chloride flush, 10 mL, PRN  heparin flush, 3 mL, PRN  glucose, 15 g, PRN  dextrose, 12.5 g, PRN  glucagon (rDNA), 1 mg, PRN  dextrose, 100 mL/hr, PRN  sodium chloride, 30 mL, 100  --    CO2 32* 39* 42*  --    BUN 16 17 18  --    CREATININE 0.8 0.7 0.7  --    GLUCOSE 196* 127* 123*  --    CALCIUM 7.7* 9.0 8.9  --    PROT 4.7* 5.9* 5.5*  --    LABALBU 2.5* 2.9* 3.4*  --    BILITOT 0.3 0.3 0.3  --    ALKPHOS 64 71 72  --    AST 29 28 25  --    ALT 39 42* 40  --       Magnesium:   Lab Results   Component Value Date    MG 2.6 11/29/2020     Phosphorus:   Lab Results   Component Value Date    PHOS 3.4 11/29/2020     Ionized Calcium: No results found for: CAION       Troponin: No results for input(s): TROPONINI in the last 72 hours. Microbiology:  Cultures during this admission:     Blood cultures:                 [] None drawn      [] Negative             [x]  Positive (Details: Coag negative staph)  Urine Culture:                   [x] None drawn      [] Negative             []  Positive (Details:  )  Sputum Culture:               [] None drawn       [] Negative             [x]  Positive (Details: repeat sent today 11/21 pending)  Endotracheal aspirate:     [] None drawn       [] Negative             [x]  Positive (Candida)  Other pertinent Labs: Strep and legionella ag negative  Art line tip-- staph aureus    Radiology/Imaging:     Chest x-ray:  11/29/20  There is no significant will change in the extensive bilateral multifocal    airspace disease.  The support lines and tubes are unchanged in position. SYSTEMS ASSESSMENT     Neuro   Intubated, sedated and paralyzed   -Increased agitation. Continue fentanyl, Versed, propofol. Add Depakote. And wean as able   - nimbex, consider stopping paralytic soon to see how patient does     Respiratory   Acute hypoxic respiratory failure secondary to COVID-19 pneumonia  -Was initially on BiPAP, was intubated 11/15  -ID following  -levofloxacin--finished for presumed community-acquired pneumonia superimposed on COVID-19 PNA  -Patient tolerating supine. Wean FiO2 and PEEP as able.   Keep plateau pressures less than 30.  -Repeat respiratory culture + candida --> fluconazole  -wean fio2  -will need trach/peg when o2/peep requirements improve  -permissive hypercapnia for ARDS low TV protocol     Superimposed PNA-completed 7 days of Levaquin on 11/18     - 11/29: Decision on trach pending family decision    Cardiovascular   Shock, septic--resolved   -CLABSI, art line growning staph and resp candida   -vanco and fluconazole   -add solucortef   -Levo as needed for MAP greater than 65   -LAKIA pending   -NICOM not fluid responsive    Prolonged qtc     Gastrointestinal   Tube feeds     11/29: PEG needed pending family decision    Renal   Hypernatremia-resolved  Hyperkalemia- resolved    Mixed Metabolic alkalosis and respiratory acidosis   -permissive hypercapnia 2/2 low TV ARDS protocol.   -urine electrolytes     Infectious Disease   COVID-19 pneumonia   -On Decadron--finished. remdesivir-finished   -ARDS protocol   -wean as able   -repeat covid positive    Superimposed community-acquired pneumonia,   - followed by ID, finish 7 days of Levaquin    Gram + cocci bacteremia-- CLABSI   -vacnco finished   -repeat cx pending   -art line tip growing staph   -LAKIA   -Respiratory culture + Candida-- fluconazol   -PICC     Hematology/Oncology   Hypercoagulability secondary to Covid pneumonia   -continue therapeutic lovenox     Endocrine   Hyperglycemia related to steroids -high-dose sliding scale insulin, Lantus 20 nightly     Social/Spiritual/DNR/Other   Full code  Palliative care consult  - 11/29: Patient now DNR CCA      Diet: DIET TUBE FEED CONTINUOUS/CYCLIC NPO; Semi-elemental; Orogastric; Continuous; 20; 65  Diet Tube Feed Modular: Protein Modular  CODE Status: DNR-CCA    Dispo: Maintain ICU level of care        KINDRA Hoyt 49, DO  Resident, PGY-1  11/29/2020  4:06 PM     I personally saw, examined and provided care for the patient. Radiographs, labs and medication list were reviewed by me independently.  I spoke with bedside nursing, therapists and consultants. Critical care services and times documented are independent of procedures and multidisciplinary rounds with Residents. Additionally comprehensive, multidisciplinary rounds were conducted with the MICU team. The case was discussed in detail and plans for care were established. Review of Residents documentation was conducted and revisions were made as appropriate. I agree with the above documented exam, problem list and plan of care. Poor prognosis   Palliative care   Rozina Toney M.D.    Pulmonary/Critical Care Medicine   36 min cct excluding procedures

## 2020-11-29 NOTE — PROGRESS NOTES
Chart reviewed. Patient remains in COVID-19 isolation. Spoke with patient's spouse, Cuong Helms. Discussed patient's condition and ongoing plan of care. Cuong Helms explains that she does not think patient would want trach/PEG but needs a day to talk to their children about decision of withdraw versus trach/PEG. Cuong Helms is tearful over the phone-support provided. Cuong Helms would also like to change code status to [facility]. Will continue to follow.

## 2020-11-30 NOTE — FLOWSHEET NOTE
Pt extubated terminally family in the room, emotional support provided. Pt with no spontaneous respirations after ventilator removed. Pt asystole on the monitor TOD 1622.

## 2020-11-30 NOTE — PROGRESS NOTES
[facility] Progress Note    Admitting Date and Time: 11/11/2020  2:12 PM  Admit Dx: Hypoxia [R09.02]  Hypoxia [R09.02]    Subjective:  Patient is being followed for Hypoxia [R09.02]  Hypoxia [R09.02]   Pt is intubated and paralyzed,  on 75% FiO2 with 92%% sats, off pressors. ROS: denies fever, chills, cp, n/v, HA unless stated above.       midodrine  10 mg Oral Q8H    lidocaine  5 mL Intradermal Once    heparin flush  1 mL Intravenous 2 times per day    dornase alpha  2.5 mg Inhalation Daily    albuterol  2.5 mg Nebulization Q6H    acetylcysteine  600 mg Inhalation BID    fluconazole  400 mg Intravenous Q24H    hydrocortisone sodium succinate PF  100 mg Intravenous Q8H    QUEtiapine  25 mg Oral BID    divalproex  500 mg Oral 2 times per day    insulin glargine  20 Units Subcutaneous Nightly    oxyCODONE  10 mg Oral Q6H    heparin flush  3 mL Intravenous 2 times per day    insulin lispro  0-18 Units Subcutaneous Q6H    pantoprazole  40 mg Intravenous Daily    And    sodium chloride (PF)  10 mL Intravenous Daily    enoxaparin  110 mg Subcutaneous BID    chlorhexidine  15 mL Mouth/Throat BID    influenza virus vaccine  0.5 mL Intramuscular Prior to discharge    sodium chloride flush  10 mL Intravenous 2 times per day    vitamin C  1,000 mg Oral BID    zinc sulfate  50 mg Oral BID    vitamin D3  400 Units Oral Daily     sodium chloride flush, 10 mL, PRN  heparin flush, 1 mL, PRN  magnesium sulfate, 1 g, PRN  potassium chloride, 20 mEq, PRN  sodium phosphate IVPB, 0.16 mmol/kg, PRN    Or  sodium phosphate IVPB, 0.32 mmol/kg, PRN  sodium chloride flush, 10 mL, PRN  heparin flush, 3 mL, PRN  glucose, 15 g, PRN  dextrose, 12.5 g, PRN  glucagon (rDNA), 1 mg, PRN  dextrose, 100 mL/hr, PRN  sodium chloride, 30 mL, PRN  acetaminophen, 650 mg, Q6H PRN    Or  acetaminophen, 650 mg, Q6H PRN  polyethylene glycol, 17 g, Daily PRN  promethazine, 12.5 mg, Q6H PRN    Or  ondansetron, 4 mg, Q6H PRN         Objective:    /60   Pulse 110   Temp 97 °F (36.1 °C) (Bladder)   Resp 30   Ht 5' 8\" (1.727 m)   Wt 239 lb 10.2 oz (108.7 kg)   SpO2 91%   BMI 36.44 kg/m²     General Appearance: intubated and paralyzed  Skin: warm and dry  Head: normocephalic and atraumatic  Eyes: pupils equal, round, and reactive to light, extraocular eye movements intact, conjunctivae normal  Neck: neck supple and non tender without mass   Pulmonary/Chest: diminished  bilaterally- no wheezes, rales or rhonchi, normal air movement  Cardiovascular: normal rate, prontaed  Extremities: no cyanosis, no clubbing and no edema  Neurologic: intubated and paralyzed        Recent Labs     11/28/20  0500 11/29/20  0530 11/29/20  1436 11/30/20  0545    144  --  159*   K 4.3 5.1* 4.8 3.7   CL 99 100  --  112*   CO2 39* 42*  --  43*   BUN 17 18  --  20   CREATININE 0.7 0.7  --  0.7   GLUCOSE 127* 123*  --  180*   CALCIUM 9.0 8.9  --  9.9       Recent Labs     11/28/20  0500 11/29/20  0617 11/29/20  1436 11/30/20  0545   WBC 10.2 8.9  --  10.4   RBC 3.59* 3.15*  --  3.47*   HGB 10.4* 9.2* 9.9* 10.0*   HCT 34.0* 29.7* 32.3* 33.8*   MCV 94.7 94.3  --  97.4   MCH 29.0 29.2  --  28.8   MCHC 30.6* 31.0*  --  29.6*   RDW 16.7* 17.2*  --  17.7*    106*  --  122*   MPV 11.5 10.8  --  10.9         Assessment:    Active Problems:    Hypoxia    COVID-19    Hypoxemia    Palliative care by specialist    Counseling regarding advance care planning and goals of care  Resolved Problems:    * No resolved hospital problems. *      Plan:  1. Acute hypoxic respiratory failure due to COVID-19 pneumonia, presented with oxygen saturation 76% on room air, intubated and paralyzed, on pressors, currently on 75% FiO2 with O2 sat 92%,  2.  Acute COVID-19 pneumonia, finished Decadron, ID on board, finished 5 days of remdesivir, CRP down to 0.8  3. Superimposed bacterial pneumonia finished 7 days of Levaquin.   4.  CLABSI, central line removed and art line was changed, started per ID on IV vanco. Repeated cultures are negative  5. Suspecting cytokine storm with elevated D-dimer, on full dose anticoagulation. 6.  Acute renal failure, presented with creatinine of 2.4 not sure about the patient's baseline he might have CKD. Patient received IV fluids, resolved, cr at 0.8  7. Elevated transaminases, most likely due to COVID-19 infection and possible cytokine storm, trending down, continue to monitor. 8. Septic shock developed during the admission with lactic acidosis, due to COVID 19 pneumonia, on pressors, still on steroids. 9.  Airway colonization with candida, on fluconazole  10. Hypernatremia, with urine output, question diabetes insipidus, will obtain serum and urine osmolality, serum osmolality was high and urine osmolality was low with low sodium in the urine indicating diabetes insipidus, patient might need to be on desmopressin, will consult nephrology. I wonder if this is a central defect of COVID-19 infection or the brain, patient is not stable enough for MRI of the brain  11. Goals of care, full code, poor prognosis. NOTE: This report was transcribed using voice recognition software. Every effort was made to ensure accuracy; however, inadvertent computerized transcription errors may be present.   Electronically signed by Tal Zarate MD on 11/30/2020 at 8:00 AM

## 2020-11-30 NOTE — PATIENT CARE CONFERENCE
Dr. Orozco Agent spoke with the wife discussed the ct scan results. Emotional support provided, they will come in to extubate pt terminally.

## 2020-11-30 NOTE — PROGRESS NOTES
[address] [place] [address] [facility]  Progress Note    SUBJECTIVE:  Chief Complaint   Patient presents with    Shortness of Breath     76% room air at arrival     Fever    Emesis     No major changes. The patient is still in the ICU. He is still intubated, sedated and paralyzed. Review of systems:  As stated above in the chief complaint, otherwise negative.     Medications:  Scheduled Meds:   sterile water        hydrocortisone sodium succinate PF  50 mg Intravenous Q8H    midodrine  10 mg Oral Q8H    heparin flush  1 mL Intravenous 2 times per day    dornase alpha  2.5 mg Inhalation Daily    albuterol  2.5 mg Nebulization Q6H    acetylcysteine  600 mg Inhalation BID    fluconazole  400 mg Intravenous Q24H    insulin glargine  20 Units Subcutaneous Nightly    heparin flush  3 mL Intravenous 2 times per day    insulin lispro  0-18 Units Subcutaneous Q6H    pantoprazole  40 mg Intravenous Daily    And    sodium chloride (PF)  10 mL Intravenous Daily    enoxaparin  110 mg Subcutaneous BID    chlorhexidine  15 mL Mouth/Throat BID    influenza virus vaccine  0.5 mL Intramuscular Prior to discharge    sodium chloride flush  10 mL Intravenous 2 times per day    vitamin C  1,000 mg Oral BID    zinc sulfate  50 mg Oral BID    vitamin D3  400 Units Oral Daily     Continuous Infusions:   cisatracurium (NIMBEX) infusion 3 mcg/kg/min (11/30/20 0751)    midazolam 9 mg/hr (11/30/20 0938)    fentaNYL 5 mcg/ml in 0.9%  ml infusion 200 mcg/hr (11/30/20 0627)    propofol 50 mcg/kg/min (11/30/20 0939)    dextrose       PRN Meds:sodium chloride flush, heparin flush, magnesium sulfate, potassium chloride, sodium phosphate IVPB **OR** sodium phosphate IVPB, sodium chloride flush, heparin flush, glucose, dextrose, glucagon (rDNA), dextrose, sodium chloride, acetaminophen **OR** acetaminophen, polyethylene glycol, promethazine **OR** ondansetron    OBJECTIVE:  /60   Pulse 115 Temp 97.7 °F (36.5 °C) (Bladder)   Resp 29   Ht 5' 8\" (1.727 m)   Wt 239 lb 10.2 oz (108.7 kg)   SpO2 90%   BMI 36.44 kg/m²   Temp  Av.2 °F (36.8 °C)  Min: 97 °F (36.1 °C)  Max: 99.9 °F (37.7 °C)  Constitutional: The patient is lying in bed. He is intubated, sedated and paralyzed. He is supinated. FiO2 75%. PEEP 14. Skin: Dry. No rashes were noted. HEENT: Round pupils. No jaundice. ET tube. OG tube. Neck: Supple to movements. Chest: Good breath sounds bilaterally. No crackles were heard. Cardiovascular: Heart sounds rhythmic and regular. Abdomen: Diminished bowel sounds. Soft to palpation. Extremities: No edema. Lines: Right PICC 2020. Right femoral arterial line 2020. Left midline 2020.     Laboratory and Tests Review:  Lab Results   Component Value Date    WBC 10.4 2020    WBC 8.9 2020    WBC 10.2 2020    HGB 10.0 (L) 2020    HCT 33.8 (L) 2020    MCV 97.4 2020     (L) 2020     Lab Results   Component Value Date    NEUTROABS 7.90 (H) 2020    NEUTROABS 6.56 2020    NEUTROABS 7.46 (H) 2020     No results found for: [facility]  Lab Results   Component Value Date    ALT 36 2020    AST 21 2020    ALKPHOS 76 2020    BILITOT 0.4 2020     Lab Results   Component Value Date     2020    K 3.7 2020    K 3.7 11/15/2020     2020    CO2 43 2020    BUN 20 2020    CREATININE 0.7 2020    CREATININE 0.7 2020    CREATININE 0.7 2020    GFRAA >60 2020    LABGLOM >60 2020    GLUCOSE 180 2020    GLUCOSE 154 2012    PROT 5.7 2020    LABALBU 3.1 2020    CALCIUM 9.9 2020    BILITOT 0.4 2020    ALKPHOS 76 2020    AST 21 2020    ALT 36 2020     Lab Results   Component Value Date    CRP 5.5 (H) 2020    CRP 0.8 (H) 2020    CRP 2.1 (H) 2020     Lab Results   Component

## 2020-11-30 NOTE — PROGRESS NOTES
Spoke with wife, Gonzalo Erm that palliative medicine call her today at 4pm when the rest of family is at her house.  Will call back at 4pm

## 2020-11-30 NOTE — PATIENT CARE CONFERENCE
Intensive Care Daily Quality Rounding Checklist        ICU Team Members:  Dr. Nadeem Ceballos, Anish Lawrence & Bhavya (residents), charge nurse, bedside nurse, respiratory therapist, clinical pharmacist     ICU Day #: NUMBER: 16     Intubation Date: November 15     Ventilator Day #: NUMBER: 16     Central Line Insertion Tuan Knee                                                    Day #: NUMBER: 9      Arterial Line Insertion Date: Caesar Dills                             Day #: NUMBER: 16     DVT Prophylaxis: Lovenox     GI Prophylaxis: Protonix      Martinez Catheter Insertion Maria De Jesus Orn                                        Day #: 16                             Continued need (if yes, reason documented and discussed with physician): yes, strict I/O, critically ill pt      Skin Issues/ Wounds and ordered treatment discussed on rounds: SOS precautions      Goals/ Plans for the Day: Daily labs, wean as able, consult Nephrology, continue to update family on status

## 2020-11-30 NOTE — PROGRESS NOTES
started. 11/17/20: Patient supine. Will attempt 6 hours and switch back to prone. 11/18/20: Patient prone overnight. Sit to supine this morning. Tolerated supine throughout the day. Put back to prone for 1 more night. 11/19/20: Patient switched to supine. Will repeat gas. Off of pressors. 11/20/20: Tolerated parenteral day. Became agitated overnight and propofol was started. .  11/21/20: Needing pressors overnight. O2 saturation good and less moving patient. With any movement or agitation, O2 saturation drops. Back on 3 sedatives overnight. leukocytosis, infectious work-up. 11/22/20: Agitated overnight. Attempt to stand up out of bed, he had feet on the ground. Nursing put him back in bed. He then calmed. Patient again became agitated with tachycardia and hypertension. He is going for Versed. Resting comfortably in bed. 11/23/20; still agitated overnight. Attempted to stand up again. Restarted back on paralytics. 11/24/20:  Wean fio2 and vent as able. Off pressors  11/25/20: back on pressors, art line tip growing staph. Start stress dose steroids  11/26/20: still requiring pressors. Became hypoxic again despite maxed vent setting, reparalyzed  11/27/20: still on paralytic, tolerating supine. Weaning pressors, afebrile  11/28/20: continues to be off pressors. Wean fio2/peep. 11/29: Mild hyperkalemia this morning, mild anemia. On recheck potassium was normal and hemoglobin was stable. Palliative care discussed with family, patient now DNR CCA. 11/30/20: continues off of pressors. Continues to be intubated. Stop seroquel/depakote/oxy. Mixed metabolic alkolosis and resp acidosis. Increased urination, concern for DI, consult nephro. Paralytic weaned and worrisome neurological exam. STAT head CT performed with bleed and herniation. Family brought in an patient made [facility] and terminally extubated.          CURRENT VENTILATION STATUS:     [x] Ventilator  [] BIPAP  [] Nasal Cannula [] Room Air SECRETIONS : Amount:  [x] Small [] Moderate  [] Large  [] None  Color:     [] White [] Colored  [] Bloody    SEDATION:    [x] Propofol gtt  [x] Versed gtt  [] Ativan gtt   [] No Sedation    PARALYZED:  [] No    [x] Yes      VASOPRESSORS:  [x] No    [] Yes    If yes -   [] Levophed       [] Dopamine     [] Vasopressin       [] Dobutamine  [] Phenylephrine         [] Epinephrine    CENTRAL LINES:     [] No   [x] Yes   (Date of Insertion:  PICC)           If yes -     [x] Right IJ     [] Left IJ [] Right Femoral [] Left Femoral                   [] Right Subclavian [] Left Subclavian       HANSON'S CATHETER:   [] No   [x] Yes  (Date of Insertion:  11/15 )     URINE OUTPUT:            [x] Good   [] Low              [] Anuric      OBJECTIVE:     VITAL SIGNS:  /60   Pulse 114   Temp 99.2 °F (37.3 °C) (Bladder)   Resp (!) 33   Ht 5' 8\" (1.727 m)   Wt 239 lb 10.2 oz (108.7 kg)   SpO2 91%   BMI 36.44 kg/m²   Tmax over 24 hours:  Temp (24hrs), Av.9 °F (36.6 °C), Min:97 °F (36.1 °C), Max:99.2 °F (37.3 °C)      Patient Vitals for the past 6 hrs:   Temp Temp src Pulse Resp SpO2 Height   20 1546 -- -- 114 (!) 33 91 % --   20 1542 -- -- 114 30 91 % --   20 1527 -- -- 112 30 90 % --   20 1500 -- -- 111 30 (!) 88 % --   20 1458 -- -- 112 (!) 119 (!) 89 % --   20 1233 99.2 °F (37.3 °C) Bladder -- -- -- --   20 1146 -- -- -- -- -- 5' 8\" (1.727 m)         Intake/Output Summary (Last 24 hours) at 2020 1728  Last data filed at 2020 1500  Gross per 24 hour   Intake 4070.64 ml   Output 8625 ml   Net -4554.36 ml     Wt Readings from Last 2 Encounters:   20 239 lb 10.2 oz (108.7 kg)     Body mass index is 36.44 kg/m². PHYSICAL EXAMINATION:  General: Intubated, sedated, supine  Eyes: conjunctivae/corneas clear, sclera non icteric  Ears: no obvious scars, no lesions, no masses  Mouth: mucous membranes somewhat dry, no obvious oral sores.   ET tube in place  Head: normocephalic, atraumatic  Neck: no JVD, trachea midline  Lungs: Diminished bilaterally  Heart: regular rate regular rhythm, no murmur, normal S1, S2  Abdomen: soft, non-tender; bowel sounds normal; no masses, no organomegaly  Extremities: mild 1-2+ pitting edema b/l upper and lower extremities, extremities atraumatic, no cyanosis, no clubbing, 2+ pedal pulses palpated  Skin: normal color, normal texture, normal turgor, no rashes, no lesions  Neurologic: unable to assess 2/2 paralyzed. Nimbex weaned, pupils 5mm and not reactive.  No movement to painful stimuli    MEDICATIONS:    Scheduled Meds:   hydrocortisone sodium succinate PF  50 mg Intravenous Q8H    midodrine  10 mg Oral Q8H    heparin flush  1 mL Intravenous 2 times per day    dornase alpha  2.5 mg Inhalation Daily    albuterol  2.5 mg Nebulization Q6H    acetylcysteine  600 mg Inhalation BID    insulin glargine  20 Units Subcutaneous Nightly    heparin flush  3 mL Intravenous 2 times per day    insulin lispro  0-18 Units Subcutaneous Q6H    pantoprazole  40 mg Intravenous Daily    And    sodium chloride (PF)  10 mL Intravenous Daily    enoxaparin  110 mg Subcutaneous BID    chlorhexidine  15 mL Mouth/Throat BID    influenza virus vaccine  0.5 mL Intramuscular Prior to discharge    sodium chloride flush  10 mL Intravenous 2 times per day    vitamin C  1,000 mg Oral BID    zinc sulfate  50 mg Oral BID    vitamin D3  400 Units Oral Daily     Continuous Infusions:   cisatracurium (NIMBEX) infusion Stopped (11/30/20 1230)    midazolam Stopped (11/30/20 1620)    fentaNYL 5 mcg/ml in 0.9%  ml infusion Stopped (11/30/20 1620)    propofol Stopped (11/30/20 1527)    dextrose       PRN Meds:   sodium chloride flush, 10 mL, PRN  heparin flush, 1 mL, PRN  magnesium sulfate, 1 g, PRN  potassium chloride, 20 mEq, PRN  sodium phosphate IVPB, 0.16 mmol/kg, PRN    Or  sodium phosphate IVPB, 0.32 mmol/kg, PRN  sodium chloride flush, 10 mL, PRN  heparin flush, 3 mL, PRN  glucose, 15 g, PRN  dextrose, 12.5 g, PRN  glucagon (rDNA), 1 mg, PRN  dextrose, 100 mL/hr, PRN  sodium chloride, 30 mL, PRN  acetaminophen, 650 mg, Q6H PRN    Or  acetaminophen, 650 mg, Q6H PRN  polyethylene glycol, 17 g, Daily PRN  promethazine, 12.5 mg, Q6H PRN    Or  ondansetron, 4 mg, Q6H PRN        VENT SETTINGS (Comprehensive) (if applicable):  Vent Information  $Ventilation: $Subsequent Day  Skin Assessment: Clean, dry, & intact  Equipment ID: [id number]  Equipment Changed: Airway securing device  Vent Type: [id number]  Vent Mode: AC/VC  Vt Ordered: 400 mL  Rate Set: 30 bmp  Peak Flow: 70 L/min  Pressure Support: 0 cmH20  FiO2 : 90 %  SpO2: 91 %  SpO2/FiO2 ratio: 101.11  Sensitivity: 3  PEEP/CPAP: 14  I Time/ I Time %: 0 s  Humidification Source: Heated wire  Humidification Temp: 37(37)  Humidification Temp Measured: 36.8  Circuit Condensation: Drained  Mask Type: Full face mask  Mask Size: Medium  Additional Respiratory  Assessments  Pulse: 114  Resp: (!) 33  SpO2: 91 %  Position: Semi-Barnett's  Humidification Source: Heated wire  Humidification Temp: 37(37)  Circuit Condensation: Drained  Oral Care: Mouth swabbed, Mouth moisturizer, Mouth suctioned  Subglottic Suction Done?: Yes  Cuff Pressure (cm H2O): 29 cm H2O    ABGs:   Recent Labs     11/30/20  0556   PH 7.338*   PCO2 96.9*   PO2 66.8*   HCO3 50.9*   BE 20.9*   O2SAT 90.4*       Laboratory findings:    Complete Blood Count:   Recent Labs     11/28/20  0500 11/29/20  0617 11/29/20  1436 11/30/20  0545   WBC 10.2 8.9  --  10.4   HGB 10.4* 9.2* 9.9* 10.0*   HCT 34.0* 29.7* 32.3* 33.8*    106*  --  122*        Last 3 Blood Glucose:   Recent Labs     11/29/20  0530 11/30/20  0545 11/30/20  1148   GLUCOSE 123* 180* 142*        PT/INR:    Lab Results   Component Value Date    PROTIME 10.9 02/16/2012    INR 0.9 02/16/2012     PTT:    Lab Results   Component Value Date    APTT 103.3 11/16/2020       Comprehensive Metabolic Profile:   Recent Labs     11/29/20  0530 11/29/20  1436 11/30/20  0545 11/30/20  1148     --  159* 169*   K 5.1* 4.8 3.7 3.2*     --  112* 122*   CO2 42*  --  43* 45*   BUN 18  --  20 20   CREATININE 0.7  --  0.7 0.7   GLUCOSE 123*  --  180* 142*   CALCIUM 8.9  --  9.9 10.4*   PROT 5.5*  --  5.7*  --    LABALBU 3.4*  --  3.1*  --    BILITOT 0.3  --  0.4  --    ALKPHOS 72  --  76  --    AST 25  --  21  --    ALT 40  --  36  --       Magnesium:   Lab Results   Component Value Date    MG 2.7 11/30/2020     Phosphorus:   Lab Results   Component Value Date    PHOS 3.7 11/30/2020     Ionized Calcium: No results found for: CAION       Troponin: No results for input(s): TROPONINI in the last 72 hours. Microbiology:  Cultures during this admission:     Blood cultures:                 [] None drawn      [] Negative             [x]  Positive (Details: Coag negative staph)  Urine Culture:                   [x] None drawn      [] Negative             []  Positive (Details:  )  Sputum Culture:               [] None drawn       [] Negative             [x]  Positive (Details: repeat sent today 11/21 pending)  Endotracheal aspirate:     [] None drawn       [] Negative             [x]  Positive (Candida)  Other pertinent Labs: Strep and legionella ag negative  Art line tip-- staph aureus    Radiology/Imaging:     Chest x-ray:  11/29/20        SYSTEMS ASSESSMENT     Neuro   Intubated, sedated and paralyzed   -Increased agitation. Continue fentanyl, Versed, propofol. Stop scheduled oxy, depakote, seroquel. Nimbex for paralytic. AMS secondary to hemorrhagic bleed and herniation   -paralytic weaned and no neurological response.  STAT head CT   -intraparanchymal hematoma with midline shift, uncal herniation, and infarcts in the cerebellum and occipital lobes and subarachnoid hemorrhage.        Respiratory   Acute hypoxic respiratory failure secondary to COVID-19 pneumonia   -Was initially on BiPAP, was intubated nightly     Social/Spiritual/DNR/Other   DNR-CCA        Critical and terminal results of the head CT were relayed to the family. They came into the hospital to be with loved one. Patient was made [facility] and medications stopped and terminally extubated. Patient  at 1622. Emotional support and condolences with the family.     ----------------------------------------------------------------------    1. Family came to be with the patient and he was terminally extubated and  at 54. Kwasi Veronica DO  Resident, PGY-2  2020  5:28 PM     Addendum:    I personally saw, examined and provided care for the patient. Radiographs, labs and medication list were reviewed by me independently. I spoke with bedside nursing, therapists and consultants. During rounds off paralytics , on neurological exam patient had fixed dilated pupils and no cough or gag reflex. This was new finding in compare to exam reported yesterday. Patient was also noted to have significant UOP 1-2 liter per hour concerning for DI. Stat CT head without contrast was ordered along wit stat BMP, serum osmolarity and urine osmolality and urine electrolytes. Unfortunately CT head results showed: Impression    Left frontal lobe/lateral basal ganglia large intraparenchymal hematoma with    fluid fluid levels resulting in 16 mm of left to right parafalcine midline    shift.  Intraventricular extension of hemorrhage into the right lateral    ventricle and 4th ventricle.  Moderate right lateral ventricular dilatation    with transependymal CSF flow. Left uncal herniation and left downward lateral transtentorial herniation    with effacement of the prepontine cisterns and low-lying cerebellar tonsils. Recent infarcts of the right superior cerebellar hemisphere as well as the    left paramedian frontal parietal lobes and left occipital lobes.     Probable small volume subarachnoid hemorrhage along the left paramedian frontal lobe. Diffuse cerebral edema. Finding discussed with Dr. Laya Delgado at 3:02 PM on 2020. I called Mrs barajas and updated her on the CT scan results and Mr. Lou English poor prognosis. Arrangements were made for them to arrive soon and be wit their loved one. After their arrival they proceed with making patient DNR-CC with terminal extubation and companionate care. Mr. Luis Leal  within minutes of extubation. Emotional support and condolences were given to family. Critical care services and times documented are independent of procedures and multidisciplinary rounds with Residents. Additionally comprehensive, multidisciplinary rounds were conducted with the MICU team. The case was discussed in detail and plans for care were established. Review of Residents documentation was conducted and revisions were made as appropriate. I agree with the above documented exam, problem list and plan of care.     Lukas Jaime MD   CCT excluding procedures:40'

## 2020-11-30 NOTE — CARE COORDINATION
COVID POSITIVE 11/11. Vent/ intubated since 11/15-FIO2 75%, PEEP 14- on paralytic. Was independent at home w/ wife PTA. [facility]amon and [facility] Felt following-Back door referrals. Will need to discuss discharge planning w/ wife when pt is more medically stable. DNR-CCA.  Will follow Rita Cardenas

## 2020-11-30 NOTE — PROGRESS NOTES
11/30/2020  12:00 PM      Comprehensive Nutrition Assessment    Type and Reason for Visit:  Reassess    Nutrition Recommendations/Plan: If pt remains on current level of propofol, receiving >800 thao/d from lipid, recommend decrease current TF rate to avoid overfeeding:    TF RECOMMENDATION:  Semi-Elemental TF (Vital AF 1.2) to goal rate 50 ml/hr and continue Protein Modular daily  This will provide: 1200 ml/d, 1540 thao (2380 thao total TF+Protein mod+propofol), 116 g protein, 973 ml free water  This regimen will meet: ~100% calorie and protein needs    Nutrition Assessment:  Pt family made pt code [id number] and are following w/Palliative care re: level of care/withdraw. Pt remains intubated/sedated/paralyzed. OGT for TF. Malnutrition Assessment:  Malnutrition Status:  Insufficient data    Context:  Acute Illness     Findings of the 6 clinical characteristics of malnutrition:  Energy Intake:  Mild decrease in energy intake (Comment)  Weight Loss:  Unable to assess     Body Fat Loss:  Unable to assess     Muscle Mass Loss:  Unable to assess    Fluid Accumulation:  No significant fluid accumulation     Strength:  Not Performed    Estimated Daily Nutrient Needs:  Energy (kcal):  ; Weight Used for Energy Requirements:  Admission     Protein (g):  100-115 (1.4-1.6 g/kg);  Weight Used for Protein Requirements:  Ideal        Fluid (ml/day):  per Critical Care; Method Used for Fluid Requirements:  Other (Comment)      Nutrition Related Findings:  intubated/sedated/ OGT to TF, +BS, off pressor, hypernatremia, +2 edema, +I/O 5L      Wounds:  None       Current Nutrition Therapies:    Current Tube Feeding (TF) Orders:  · Feeding Route: Orogastric  · Formula: Semi-Elemental  · Schedule: Continuous  · Additives/Modulars: Protein(daily)  · Water Flushes: per protocol =180 ml/d  · Current TF & Flush Orders Provides: at goal  · Goal TF & Flush Orders Provides: 1560 ml/d, 1972 thao, 143 g pro, 1445 ml total free water Additional Calorie sources: propofol = 840 thao/d      Anthropometric Measures:  · Height: 5' 8\" (172.7 cm)  · Current Body Weight: 239 lb (108.4 kg)(11/25-Covid isolation - UTO updated wt)   · Admission Body Weight: 234 lb (106.1 kg)(11/18 first measured wt)    · Usual Body Weight: (UTO)     · Ideal Body Weight: 154 lbs; % Ideal Body Weight 155.2 %   · BMI: 36.3  · BMI Categories: Obese Class 2 (BMI 35.0 -39.9)       Nutrition Diagnosis:   · Inadequate oral intake related to impaired respiratory function(2/2 COVID-19 PNA) as evidenced by NPO or clear liquid status due to medical condition, intubation      Nutrition Interventions:   Food and/or Nutrient Delivery:  Modify Tube Feeding(Recommend decrease TF rate, as pt getting >800 thao/d from propofol)  Nutrition Education/Counseling:  Education not indicated   Coordination of Nutrition Care:  Continue to monitor while inpatient    Goals:  Pt jb EN at goal rate       Nutrition Monitoring and Evaluation:   Food/Nutrient Intake Outcomes:  Enteral Nutrition Intake/Tolerance  Physical Signs/Symptoms Outcomes:  Biochemical Data, GI Status, Fluid Status or Edema, Skin, Weight, Nutrition Focused Physical Findings     Discharge Planning:     Too soon to determine     Electronically signed by Trina Villagran RD, CNSC, LD on 11/30/20 at 12:00 PM EST    Contact: [phone]

## 2020-12-01 NOTE — DISCHARGE SUMMARY
admission with lactic acidosis, due to COVID 19 pneumonia, on pressors, still on steroids. 9.  Airway colonization with candida, on fluconazole  10. Hypernatremia, with urine output, question diabetes insipidus, will obtain serum and urine osmolality, serum osmolality was high and urine osmolality was low with low sodium in the urine indicating diabetes insipidus, patient might need to be on desmopressin, we obtained a CT of the brain that showed a large hematoma with midline shift, family decided to withdraw care  Patient  on 2020 at 16:22    Discharge Exam:  Please see the exam prior to the patient expiring from the note earlier today      CT brain:  Left frontal lobe/lateral basal ganglia large intraparenchymal hematoma with   fluid fluid levels resulting in 16 mm of left to right parafalcine midline   shift.  Intraventricular extension of hemorrhage into the right lateral   ventricle and 4th ventricle.  Moderate right lateral ventricular dilatation   with transependymal CSF flow. Left uncal herniation and left downward lateral transtentorial herniation   with effacement of the prepontine cisterns and low-lying cerebellar tonsils. 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 Probable small volume subarachnoid hemorrhage along the left paramedian   frontal lobe. Diffuse cerebral edema. I/O last 3 completed shifts: In: 7633.1 [I.V.:5455. 1; NG/GT:2178]  Out: 04503 [Urine:97343]  I/O this shift:   In: 8014.6 [I.V.:6316.6; NG/GT:1698]  Out: 30955 [BOIWR:01633]      LABS:  Recent Labs     20  0530 20  1436 20  0545 20  1148     --  159* 169*   K 5.1* 4.8 3.7 3.2*     --  112* 122*   CO2 42*  --  43* 45*   BUN 18  --     CREATININE 0.7  --  0.7 0.7   GLUCOSE 123*  --  180* 142*   CALCIUM 8.9  --  9.9 10.4*       Recent Labs     20  0617 20  1436 11/30/20  0545   WBC 8.9  --  10.4   RBC 3.15*  --  3.47*   HGB 9.2* 9.9* 10.0*   HCT 29.7* 32.3* 33.8*   MCV 94.3  --  97.4   MCH 29.2  --  28.8   MCHC 31.0*  --  29.6*   RDW 17.2*  --  17.7*   *  --  122*   MPV 10.8  --  10.9       No results for input(s): POCGLU in the last 72 hours. Imaging:  Ct Abdomen Pelvis Wo Contrast Additional Contrast? None    Result Date: 11/11/2020  EXAMINATION: CT OF THE ABDOMEN AND PELVIS WITHOUT CONTRAST 11/11/2020 5:21 pm TECHNIQUE: CT of the abdomen and pelvis was performed without the administration of intravenous contrast. Multiplanar reformatted images are provided for review. Dose modulation, iterative reconstruction, and/or weight based adjustment of the mA/kV was utilized to reduce the radiation dose to as low as reasonably achievable. COMPARISON: None. HISTORY: ORDERING SYSTEM PROVIDED HISTORY: abdominal pain, RUQ TECHNOLOGIST PROVIDED HISTORY: Reason for exam:->abdominal pain, RUQ Additional Contrast?->None FINDINGS: Lower Chest: See separate report of CT of the chest. Organs: The unenhanced hepatic and splenic parenchyma are within normal limits. Adrenals, pancreas and gallbladder within normal limits. No evidence of urolithiasis or obstructive uropathy. GI/Bowel: No bowel obstruction or free intraperitoneal air. No evidence of colitis or diverticulitis. Normal appendix, no signs of appendicitis. Pelvis: Urinary bladder within normal limits. No free fluid in the pelvis. Peritoneum/Retroperitoneum: no abdominal aortic aneurysm or retroperitoneal adenopathy. Bones/Soft Tissues: No acute bony pathology. No evidence of acute intra-abdominal pathology. Ct Chest Wo Contrast    Result Date: 11/11/2020  EXAMINATION: CT OF THE CHEST WITHOUT CONTRAST 11/11/2020 5:21 pm TECHNIQUE: CT of the chest was performed without the administration of intravenous contrast. Multiplanar reformatted images are provided for review.  Dose modulation, iterative reconstruction, and/or weight based adjustment of the mA/kV was utilized to reduce the radiation dose to as low as reasonably achievable. COMPARISON: None. HISTORY: ORDERING SYSTEM PROVIDED HISTORY: PE concern TECHNOLOGIST PROVIDED HISTORY: Reason for exam:->PE concern FINDINGS: Mediastinum: Nonspecific mediastinal nodes, which may be reactive. no aneurysmal dilatation of the thoracic aorta. No pericardial effusion. No evidence of hilar adenopathy. Lungs/pleura: Multifocal confluent and ground-glass airspace disease in the lungs bilaterally, most extensive in the right upper lobe, and left lower lobe, and most consistent with multifocal pneumonia. Central air bronchograms. Emphysematous changes in the lungs with peripheral bulla. No pleural effusions. Upper Abdomen: Images through the upper abdomen are unremarkable. Soft Tissues/Bones: No evidence of acute bony pathology. Multifocal confluent and ground-glass airspace disease in the lungs bilaterally most consistent with multifocal pneumonia. Emphysematous changes in the lungs with peripheral bulla. Nonspecific mediastinal nodes, which may be reactive. Limited evaluation without intravenous contrast.     Xr Chest Portable    Result Date: 11/11/2020  EXAMINATION: ONE XRAY VIEW OF THE CHEST 11/11/2020 2:56 pm COMPARISON: 02/16/2012 HISTORY: ORDERING SYSTEM PROVIDED HISTORY: chest pain TECHNOLOGIST PROVIDED HISTORY: Reason for exam:->chest pain FINDINGS: The cardiac silhouette is within normal limits. Significant multifocal bilateral pulmonary infiltrates are noted. There is no pleural effusion.      1. Significant bilateral multifocal pulmonary infiltrates           Note that more than 30 minutes was spent in preparing discharge papers    Signed:  Electronically signed by Lobo Dumont MD on 12/1/2020 at 8:52 AM

## 2024-07-05 NOTE — PROGRESS NOTES
[address] [place] [address] [facility]  JORDONIDA  Progress Note    SUBJECTIVE:  Chief Complaint   Patient presents with    Shortness of Breath     76% room air at arrival     Fever    Emesis     The patient is still on the ventilator. He had to be paralyzed once again. He is still in the ICU. Remains afebrile. Review of systems:  As stated above in the chief complaint, otherwise negative.     Medications:  Scheduled Meds:   dornase alpha  2.5 mg Inhalation Daily    albuterol  2.5 mg Nebulization Q6H    acetylcysteine  600 mg Inhalation BID    fluconazole  400 mg Intravenous Q24H    hydrocortisone sodium succinate PF  100 mg Intravenous Q8H    QUEtiapine  25 mg Oral BID    divalproex  500 mg Oral 2 times per day    insulin glargine  20 Units Subcutaneous Nightly    oxyCODONE  10 mg Oral Q6H    heparin flush  3 mL Intravenous 2 times per day    insulin lispro  0-18 Units Subcutaneous Q6H    midodrine  10 mg Oral TID WC    pantoprazole  40 mg Intravenous Daily    And    sodium chloride (PF)  10 mL Intravenous Daily    enoxaparin  110 mg Subcutaneous BID    chlorhexidine  15 mL Mouth/Throat BID    influenza virus vaccine  0.5 mL Intramuscular Prior to discharge    sodium chloride flush  10 mL Intravenous 2 times per day    vitamin C  1,000 mg Oral BID    zinc sulfate  50 mg Oral BID    vitamin D3  400 Units Oral Daily     Continuous Infusions:   cisatracurium (NIMBEX) infusion 2 mcg/kg/min (11/27/20 0400)    midazolam 8 mg/hr (11/27/20 0400)    fentaNYL 5 mcg/ml in 0.9%  ml infusion 200 mcg/hr (11/27/20 0747)    norepinephrine 8 mcg/min (11/27/20 0400)    propofol 30 mcg/kg/min (11/27/20 0746)    dextrose       PRN Meds:magnesium sulfate, potassium chloride, sodium phosphate IVPB **OR** sodium phosphate IVPB, sodium chloride flush, heparin flush, glucose, dextrose, glucagon (rDNA), dextrose, sodium chloride, sodium chloride flush, acetaminophen **OR** acetaminophen, Improving after conservative tx    Restarting phys therapy for back/rib pains that started with cane use has been helpful   polyethylene glycol, promethazine **OR** ondansetron    OBJECTIVE:  BP (!) 114/54   Pulse 82   Temp 97.2 °F (36.2 °C) (Bladder)   Resp 30   Ht 5' 8\" (1.727 m)   Wt 239 lb 10.2 oz (108.7 kg)   SpO2 97%   BMI 36.44 kg/m²   Temp  Av.7 °F (35.9 °C)  Min: 95.9 °F (35.5 °C)  Max: 97.2 °F (36.2 °C)  Constitutional: The patient is lying in bed. He is intubated, sedated and paralyzed. He is supinated. FiO2 down to 80%. PEEP 14. Skin: Dry. No rashes were noted. HEENT: Round pupils. No jaundice. ET tube. OG tube. Neck: Supple to movements. Chest: Good breath sounds bilaterally. No crackles were heard. Cardiovascular: Heart sounds rhythmic and regular. Abdomen: Diminished bowel sounds. Soft to palpation. Extremities: No edema. Lines: Right PICC 2020. Right femoral arterial line 2020.     Laboratory and Tests Review:  Lab Results   Component Value Date    WBC 8.3 2020    WBC 9.8 2020    WBC 11.0 2020    HGB 10.4 (L) 2020    HCT 33.4 (L) 2020    MCV 92.5 2020     2020     Lab Results   Component Value Date    NEUTROABS 6.66 2020    NEUTROABS 8.24 (H) 2020    NEUTROABS 8.34 (H) 2020     No results found for: [facility]  Lab Results   Component Value Date    ALT 39 2020    AST 29 2020    ALKPHOS 64 2020    BILITOT 0.3 2020     Lab Results   Component Value Date     2020    K 4.5 2020    K 3.7 11/15/2020     2020    CO2 32 2020    BUN 16 2020    CREATININE 0.8 2020    CREATININE 0.7 2020    CREATININE 0.8 2020    GFRAA >60 2020    LABGLOM >60 2020    GLUCOSE 196 2020    GLUCOSE 154 2012    PROT 4.7 2020    LABALBU 2.5 2020    CALCIUM 7.7 2020    BILITOT 0.3 2020    ALKPHOS 64 2020    AST 29 2020    ALT 39 2020     Lab Results   Component Value Date    CRP 2.1 (H) 2020    CRP 0.4 11/23/2020    CRP 0.5 (H) 11/21/2020     Lab Results   Component Value Date    SEDRATE 80 (H) 11/12/2020     Radiology:  Chest x-ray shows bilateral interstitial infiltrates    Microbiology:   Streptococcus pneumoniae/Legionella urine Ag: Negative  Blood cultures 11/11/2020: Negative x2  Blood cultures 11/21/2020: CoNS in 1 of 2 sets  Blood cultures 11/22/2020: Negative so far  Respiratory culture 11/17/2020: OP nikita reduced  Respiratory culture 11/21/2020: Candida albicans  Nares screen MRSA: Pending  Arterial line tip culture 11/23/2020: >15 colonies Staphylococcus species    No results for input(s): PROCAL in the last 72 hours. ASSESSMENT:  · Moderate to severe SARS-CoV-2 infection with pneumonia. Completed 5 days of Remdesivir 11/16/2020  · Acute respiratory failure. Ventilator dependent  · Lymphopenia secondary to SARS-CoV-2 infection  · CoNS bacteremia. Probable CLABSI. TLC was removed. Repeat cultures are negative. Treated briefly with Vancomycin  · Adrenal crisis mimicking septic shock, improved on steroids  · Fever secondary to adrenal crisis and CLABSI, improved  · Leukocytosis associated to the above, resolved  · Probable community-acquired pneumonia.   Completed 7 days of Levofloxacin 11/18/2020   · Airway colonization with Candida  · Elevation of transaminases associated to the above, improved    PLAN:  · Continue Fluconazole for Candida overgrowth  · Anticoagulation  · Stress dose steroids  · Prognosis is poor  · Patient will need tracheostomy and PEG    Spoke with nursing    Mary Bryant  8:16 AM  11/27/2020